# Patient Record
Sex: FEMALE | Race: BLACK OR AFRICAN AMERICAN | NOT HISPANIC OR LATINO | Employment: OTHER | URBAN - METROPOLITAN AREA
[De-identification: names, ages, dates, MRNs, and addresses within clinical notes are randomized per-mention and may not be internally consistent; named-entity substitution may affect disease eponyms.]

---

## 2017-05-05 DIAGNOSIS — Z12.11 COLON CANCER SCREENING: Primary | ICD-10-CM

## 2018-04-18 ENCOUNTER — OFFICE VISIT (OUTPATIENT)
Dept: INTERNAL MEDICINE | Facility: CLINIC | Age: 73
End: 2018-04-18

## 2018-04-18 VITALS
SYSTOLIC BLOOD PRESSURE: 140 MMHG | HEART RATE: 84 BPM | OXYGEN SATURATION: 98 % | RESPIRATION RATE: 16 BRPM | DIASTOLIC BLOOD PRESSURE: 85 MMHG | WEIGHT: 194.2 LBS | BODY MASS INDEX: 34.4 KG/M2 | TEMPERATURE: 98.3 F

## 2018-04-18 DIAGNOSIS — M54.50 CHRONIC RIGHT-SIDED LOW BACK PAIN WITHOUT SCIATICA: Primary | ICD-10-CM

## 2018-04-18 DIAGNOSIS — M54.9 UPPER BACK PAIN ON RIGHT SIDE: ICD-10-CM

## 2018-04-18 DIAGNOSIS — M65.90 SYNOVITIS AND TENOSYNOVITIS: ICD-10-CM

## 2018-04-18 DIAGNOSIS — G89.29 CHRONIC RIGHT-SIDED LOW BACK PAIN WITHOUT SCIATICA: Primary | ICD-10-CM

## 2018-04-18 PROCEDURE — 99214 OFFICE O/P EST MOD 30 MIN: CPT | Performed by: INTERNAL MEDICINE

## 2018-04-18 NOTE — MR AVS SNAPSHOT
After Visit Summary   4/18/2018    Essie Huddleston    MRN: [id number]           Patient Information     Date Of Birth          1945        Visit Information        Provider Department      4/18/2018 3:40 PM Sbe Bass MD [facility]        Today's Diagnoses     Chronic right-sided low back pain without sciatica    -  1    Synovitis and tenosynovitis        Upper back pain on right side          Care Instructions    *  Thumb tendonitis (irritation of the tendon as it runs through the tendon sheath.      --thumb spica splint to help prevent the thumb movement.  Wear this as needed when sleeping, when using your hands etc.           --if the thumb continues to hurt, then you may need to see a hadn specialist for injections into that tendon.     *  Lumbar strain/sprain/spasm and upper back muscle strain    *  based on your history,  No evidence for herniated disc, spinal stenosis, arthritis, or vertebral fracture.    *  take over the counter Motrin/Ibuprofen/Advil or Aleve to help relieve pain and inflammation.  follow the directions on the bottle.  Be sure to take with a small meal to prevent stomach upset.      --Motrin 600 mg ( 3 x 200 mg tablets) three times per day every day for 5-7 days, then 2-3 timers per day as needed (take with food to avoid stomach side effects)     OR     --Aleve 2 tablets twice per day for 5-7 days every day, then twice per day as needed     *  do not go out of your way for activity, however, do not avoid basic activates and gentle activity.  Do not lay on the sofa, do not go on bed rest.      *  moist heat as needed ( do not use a heating pad for longer than 20 minutes to lower the risk of burns)    *  avoid any lifting for the next 1 week, then a slow return to activity.  Remember to always use proper lifting technique, always bending at the knees rather than the waist.  Your thigh muscles are MUCH stronger than the smaller muscle of  "your lower back.    *  Physical therapy through [facility] (many locations throughout the south and [place] [facility]) as needed.    *  expect your back to be more easily re-aggravated over the next few months.  the soft tissue and muscles are going to be more easily re-irritated over the next several weeks so be careful to return to physical action slowly.    Look for back execises on Google.  (search \"low back pain rehabilitation exercises\" and \"upper back rehabilitation exercises\"):  Here are some examples:    --https://[facility]/sites/default/files/LowBackPain.pdf  --https://mydoctor.[facility]/ncal/Images/Low_Back_Pain_Exercises_[email or web address].pdf      *  Contact the  of [employer] and see if there are any discount coupons or programs that you may be eligible for, you can download the application from their web site.     *  If the Levemir insulin is very expensive, then consider changing to the [employer] brand insulin \"N\" insulin, which is taken twice per day.  You would take 1/2 of the Levemir dose twice per day.               *  Use the web site pr smart phone sathya \"GoodRX\" to investigate the cheapest places to get medications.      *  Return to see Dr. Driscoll to re-evaluate the diabetes when you get a chance.      *  Visit the American Diabetes Association web site for more information in diabetes.                   Follow-ups after your visit        Additional Services     [facility] PT, HAND, AND CHIROPRACTIC REFERRAL       **This order will print in the [facility] Scheduling Office**    Physical Therapy, Hand Therapy and Chiropractic Care are available through:    *[facility]  *[facility]  *[facility]    Call one number to schedule at any of the above locations: [phone].    Your provider has referred you to: Physical Therapy at [facility] or [facility]    Indication/Reason for Referral: Low Back Pain and upper back pain  Onset of Illness: " "chronic  Therapy Orders: Evaluate and Treat  Special Programs: None  Special Request: AS INDICATED    Aishwarya Neff      Additional Comments for the Therapist or Chiropractor:     Please be aware that coverage of these services is subject to the terms and limitations of your health insurance plan.  Call member services at your health plan with any benefit or coverage questions.      Please bring the following to your appointment:    *Your personal calendar for scheduling future appointments  *Comfortable clothing                  Who to contact     If you have questions or need follow up information about today's clinic visit or your schedule please contact [facility] directly at [phone].  Normal or non-critical lab and imaging results will be communicated to you by Trueffecthart, letter or phone within 4 business days after the clinic has received the results. If you do not hear from us within 7 days, please contact the clinic through Trueffecthart or phone. If you have a critical or abnormal lab result, we will notify you by phone as soon as possible.  Submit refill requests through Department of Health and Human Services or call your pharmacy and they will forward the refill request to us. Please allow 3 business days for your refill to be completed.          Additional Information About Your Visit        MyChart Information     [facility] lets you send messages to your doctor, view your test results, renew your prescriptions, schedule appointments and more. To sign up, go to [email or web address] . Click on \"Log in\" on the left side of the screen, which will take you to the Welcome page. Then click on \"Sign up Now\" on the right side of the page.     You will be asked to enter the access code listed below, as well as some personal information. Please follow the directions to create your username and password.     Your access code is: [email or web address]  Expires: 2018  5:07 PM     Your access code will  in 90 days. If you " need help or a new code, please call your [facility] clinic or [phone].        Care EveryWhere ID     This is your Care EveryWhere ID. This could be used by other organizations to access your [facility] medical records  [id number]        Your Vitals Were     Pulse Temperature Respirations Pulse Oximetry BMI (Body Mass Index)       84 98.3  F (36.8  C) (Oral) 16 98% 34.4 kg/m2        Blood Pressure from Last 3 Encounters:   04/18/18 140/85   08/09/16 146/82   03/10/15 130/82    Weight from Last 3 Encounters:   04/18/18 194 lb 3.2 oz (88.1 kg)   08/09/16 190 lb (86.2 kg)   03/10/15 191 lb (86.6 kg)              We Performed the Following     [facility] PT, HAND, AND CHIROPRACTIC REFERRAL        Primary Care Provider Office Phone # Fax #    Luigi Driscoll [phone]       [address] [facility] [address]        Equal Access to Services     [facility]: Hadii aad ku hadasho Soomaali, waaxda luqadaha, qaybta kaalmada adeegyada, waxgwen zaragoza hayasa redd . So [facility] [phone].    ATENCIÓN: Si habla español, tiene a rincon disposición servicios gratuitos de asistencia lingüística. Llame al [phone].    We comply with applicable federal civil rights laws and [place] laws. We do not discriminate on the basis of race, color, national origin, age, disability, sex, sexual orientation, or gender identity.            Thank you!     Thank you for choosing [facility]  for your care. Our goal is always to provide you with excellent care. Hearing back from our patients is one way we can continue to improve our services. Please take a few minutes to complete the written survey that you may receive in the mail after your visit with us. Thank you!             Your Updated Medication List - Protect others around you: Learn how to safely use, store and throw away your medicines at www.disposemymeds.org.          This list is accurate as of 4/18/18  5:09 PM.  Always use your most  recent med list.                   Brand Name Dispense Instructions for use Diagnosis    amLODIPine 5 MG tablet    NORVASC    90 tablet    Take 1 tablet (5 mg) by mouth daily    Essential hypertension with goal blood pressure less than 130/80       aspirin 81 MG tablet     10 tablet    Take 1 tablet (81 mg) by mouth daily    HTN (hypertension), Type 2 diabetes, HbA1c goal < 7% (H)       glipiZIDE 5 MG tablet    GLUCOTROL    180 tablet    Take 1 tablet (5 mg) by mouth 2 times daily Before meals    Type 2 diabetes mellitus with diabetic nephropathy (H)       insulin detemir 100 UNIT/ML injection    LEVEMIR FLEXTOUCH    3 Month    30-35 units daily    Type 2 diabetes mellitus with diabetic nephropathy (H)       insulin pen needle 31G X 6 MM     100 each    Use 1 pen needles daily or as directed.    Type 2 diabetes mellitus with diabetic nephropathy (H)       losartan-hydrochlorothiazide 100-12.5 MG per tablet    HYZAAR    90 tablet    Take 1 tablet by mouth daily    Essential hypertension with goal blood pressure less than 130/80       metFORMIN 1000 MG tablet    GLUCOPHAGE    180 tablet    Take 1 tablet (1,000 mg) by mouth 2 times daily (with meals)    Type 2 diabetes mellitus with diabetic nephropathy (H)       NEW MED      Take 1 tablet by mouth daily. Cachnerve Tablets.        order for DME     200 strip    Equipment being ordered:  Glucometer test strips. Check sugars two times a day    Type 2 diabetes mellitus with diabetic nephropathy (H)       simvastatin 20 MG tablet    ZOCOR    90 tablet    Take 1 tablet (20 mg) by mouth At Bedtime    Hyperlipidemia LDL goal <100       traMADol 50 MG tablet    ULTRAM    50 tablet    Take 1 tablet (50 mg) by mouth 2 times daily as needed for moderate pain    Back pain       vitamin D 2000 units tablet     100 tablet    Take 2,000 Units by mouth daily.    Vitamin D deficiency

## 2018-04-18 NOTE — PATIENT INSTRUCTIONS
"*  Thumb tendonitis (irritation of the tendon as it runs through the tendon sheath.      --thumb spica splint to help prevent the thumb movement.  Wear this as needed when sleeping, when using your hands etc.           --if the thumb continues to hurt, then you may need to see a hadn specialist for injections into that tendon.     *  Lumbar strain/sprain/spasm and upper back muscle strain    *  based on your history,  No evidence for herniated disc, spinal stenosis, arthritis, or vertebral fracture.    *  take over the counter Motrin/Ibuprofen/Advil or Aleve to help relieve pain and inflammation.  follow the directions on the bottle.  Be sure to take with a small meal to prevent stomach upset.      --Motrin 600 mg ( 3 x 200 mg tablets) three times per day every day for 5-7 days, then 2-3 timers per day as needed (take with food to avoid stomach side effects)     OR     --Aleve 2 tablets twice per day for 5-7 days every day, then twice per day as needed     *  do not go out of your way for activity, however, do not avoid basic activates and gentle activity.  Do not lay on the sofa, do not go on bed rest.      *  moist heat as needed ( do not use a heating pad for longer than 20 minutes to lower the risk of burns)    *  avoid any lifting for the next 1 week, then a slow return to activity.  Remember to always use proper lifting technique, always bending at the knees rather than the waist.  Your thigh muscles are MUCH stronger than the smaller muscle of your lower back.    *  Physical therapy through [facility] (many locations throughout the south and [facility]) as needed.    *  expect your back to be more easily re-aggravated over the next few months.  the soft tissue and muscles are going to be more easily re-irritated over the next several weeks so be careful to return to physical action slowly.    Look for back execises on [employer].  (search \"low back pain rehabilitation exercises\" and \"upper " "back rehabilitation exercises\"):  Here are some examples:    --https://[facility].[place].[facility]/sites/default/files/LowBackPain.pdf  --https://mydoctor.[facility]/ncal/Images/Low_Back_Pain_Exercises_[email or web address]      *  Contact the  of [employer] and see if there are any discount coupons or programs that you may be eligible for, you can download the application from their web site.     *  If the Levemir insulin is very expensive, then consider changing to the [employer] brand insulin \"N\" insulin, which is taken twice per day.  You would take 1/2 of the Le[employer] dose twice per day.               *  Use the web site pr smart phone sathya \"GoodRX\" to investigate the cheapest places to get medications.      *  Return to see Dr. Driscoll to re-evaluate the diabetes when you get a chance.      *  Visit the American Diabetes Association web site for more information in diabetes.           "

## 2018-04-18 NOTE — PROGRESS NOTES
SUBJECTIVE:   Essie Huddleston is a 73 year old female who presents to clinic today for the following health issues:      Musculoskeletal problem/pain      Duration: about 3-4 months    Description  Location: Left thumb, right shoulder and right hip    Intensity:  Severe 8/10    Accompanying signs and symptoms: numbness and tingling. Thumb is swollen    History  Previous similar problem: YES- with the hip when she had issues with the sciatica  Previous evaluation:  none    Precipitating or alleviating factors:  Trauma or overuse: no   Aggravating factors include: cold or damp weather and when laying down    Therapies tried and outcome: gabapentin and tramadol it relieves it for awhile then when the medication stops the pain returns.          Problem list and histories reviewed & adjusted, as indicated.  Additional history: as documented        Reviewed and updated as needed this visit by clinical staff  Tobacco  Allergies  Meds       Reviewed and updated as needed this visit by Provider           Past Medical History:  ---------------------------  Past Medical History:   Diagnosis Date     Hyperlipidemia LDL goal <100 6/20/2012     Hypertension      Type 2 diabetes mellitus with diabetic nephropathy  (goal A1C<7) 10/24/2015     Vitamin D deficiency 6/20/2012       Past Surgical History:  ---------------------------  No past surgical history on file.    Current Medications:  ---------------------------  Current Outpatient Prescriptions   Medication Sig Dispense Refill     amLODIPine (NORVASC) 5 MG tablet Take 1 tablet (5 mg) by mouth daily 90 tablet 1     aspirin 81 MG tablet Take 1 tablet (81 mg) by mouth daily 10 tablet 0     Cholecalciferol (VITAMIN D) 2000 UNIT tablet Take 2,000 Units by mouth daily. 100 tablet 3     glipiZIDE (GLUCOTROL) 5 MG tablet Take 1 tablet (5 mg) by mouth 2 times daily Before meals 180 tablet 1     insulin detemir (LEVEMIR FLEXTOUCH) 100 UNIT/ML soln 30-35 units daily 3 Month 1      insulin pen needle 31G X 6 MM Use 1 pen needles daily or as directed. 100 each 1     losartan-hydrochlorothiazide (HYZAAR) 100-12.5 MG per tablet Take 1 tablet by mouth daily 90 tablet 1     metFORMIN (GLUCOPHAGE) 1000 MG tablet Take 1 tablet (1,000 mg) by mouth 2 times daily (with meals) 180 tablet 1     NEW MED Take 1 tablet by mouth daily. Cachnerve Tablets.       order for DME Equipment being ordered:  Glucometer test strips. Check sugars two times a day 200 strip 1     simvastatin (ZOCOR) 20 MG tablet Take 1 tablet (20 mg) by mouth At Bedtime 90 tablet 1     traMADol (ULTRAM) 50 MG tablet Take 1 tablet (50 mg) by mouth 2 times daily as needed for moderate pain 50 tablet 0       Allergies:  -------------  Allergies   Allergen Reactions     Nkda [No Known Drug Allergies]        Social History:  -------------------  Social History     Social History     Marital status:      Spouse name: N/A     Number of children: N/A     Years of education: N/A     Occupational History     Not on file.     Social History Main Topics     Smoking status: Never Smoker     Smokeless tobacco: Never Used     Alcohol use Yes      Comment: seldom 1 glass wine.     Drug use: No     Sexual activity: No     Other Topics Concern     Not on file     Social History Narrative       Family Medical History:  ------------------------------  Family History   Problem Relation Age of Onset     DIABETES Mother      CANCER Father      prostate     Asthma Son          ROS:  REVIEW OF SYSTEMS:    RESP: negative for cough, dyspnea, wheezing, hemoptysis  CV: negative for chest pain, palpitations, PND, AKHTAR, orthopnea; reports no changes in their ability to perform physical activity (from cardiovascular standpoint)  GI: negative for dysphagia, N/V, pain, melena, diarrhea and constipation  NEURO: negative for numbness/tingling, paralysis, incoordination, or focal weakness     OBJECTIVE:                                                    /85   Pulse 84  Temp 98.3  F (36.8  C) (Oral)  Resp 16  Wt 194 lb 3.2 oz (88.1 kg)  SpO2 98%  BMI 34.4 kg/m2     GENERAL alert and no distress  EYES:  Normal sclera,conjunctiva, EOMI  HENT: oral and posterior pharynx without lesions or erythema, facies symmetric  NECK: Neck supple. No LAD, without thyroidmegaly or JVD., Carotids without bruits.  RESP: Clear to ausculation bilaterally without wheezes or crackles. Normal BS in all fields.  CV: RRR normal S1S2 without murmurs, rubs or gallops. PMI normal  LYMPH: no cervical lymph adenopathy appreciated  MS: extremities- no gross deformities of the visible extremities noted, no edema  PSYCH: Alert and oriented times 3; speech- coherent  SKIN:  No obvious significant skin lesions on visible portions of face     BACK:  Pt appears uncomfortable, but not in severe distress.    Pain to palpation of the muscle of the upper back, particularly the rhomboids.  The muscles in the upper and mid back are in spasm and tight.  Palpation here exactly reproduces their pain.  No pain palpation of the vertebral bodies themselves.   Pain to palpation of paraspinal lumbar muscles, muscles in spasm, palpation reproduces pain exactly. straight leg-raises negative bilaterally.  Able to move on and off the exam table, no obsevred weakness in the feet or legs with walking, standing, or ambulation. Normal reflexes in the achilles and patellar tendons.     HAND:  Pain in right thumb tendon. Positive finklesteins test.      ASSESSMENT/PLAN:                                                      (M54.5,  G89.29) Chronic right-sided low back pain without sciatica  (primary encounter diagnosis)  Comment: Discussed typical mechanical back pain, typical causes, and atypical back pain, including red flag symptoms.  Discussed conservative tratments inclduing physical therpy, stretching and strengthening, use of heat and/or ice, NSAIDs with food, antispasmodics where indicated, and the use of narcotic pain  meds where indicated.    Plan: [facility] PT, HAND, AND CHIROPRACTIC REFERRAL            (M65.9) Synovitis and tenosynovitis  Comment: thumba spcia splint  Refer to Ortho for injection if no  Better.   Plan:     (M54.9) Upper back pain on right side  Comment: Upper back pain with fair amount of myofascial pain.  No evidence for spinal or vertebral pathology.  No imaging indicated at this time.   Discussed the typical causes for these pains and recommended proper ergonomics and body mechanical issues.   Recommended moist heat, NSAIDs if able to tolerate, stretching, massage/trigger point release, and physical therapy if the patient desires.  Told the patient to return if there are any changes in the symptoms worsen, change in any way, or fail to respond to these conservative measures.     Plan: [facility] PT, HAND, AND CHIROPRACTIC REFERRAL               *  Thumb tendonitis (irritation of the tendon as it runs through the tendon sheath.      --thumb spica splint to help prevent the thumb movement.  Wear this as needed when sleeping, when using your hands etc.           --if the thumb continues to hurt, then you may need to see a hadn specialist for injections into that tendon.     *  Lumbar strain/sprain/spasm and upper back muscle strain    *  based on your history,  No evidence for herniated disc, spinal stenosis, arthritis, or vertebral fracture.    *  take over the counter Motrin/Ibuprofen/Advil or Aleve to help relieve pain and inflammation.  follow the directions on the bottle.  Be sure to take with a small meal to prevent stomach upset.      --Motrin 600 mg ( 3 x 200 mg tablets) three times per day every day for 5-7 days, then 2-3 timers per day as needed (take with food to avoid stomach side effects)     OR     --Aleve 2 tablets twice per day for 5-7 days every day, then twice per day as needed     *  do not go out of your way for activity, however, do not avoid basic activates and gentle activity.  Do not lay on the sofa,  "do not go on bed rest.      *  moist heat as needed ( do not use a heating pad for longer than 20 minutes to lower the risk of burns)    *  avoid any lifting for the next 1 week, then a slow return to activity.  Remember to always use proper lifting technique, always bending at the knees rather than the waist.  Your thigh muscles are MUCH stronger than the smaller muscle of your lower back.    *  Physical therapy through [facility] (many locations throughout the [place] and [facility]) as needed.    *  expect your back to be more easily re-aggravated over the next few months.  the soft tissue and muscles are going to be more easily re-irritated over the next several weeks so be careful to return to physical action slowly.    Look for back execises on Google.  (search \"low back pain rehabilitation exercises\" and \"upper back rehabilitation exercises\"):  Here are some examples:    --https://[facility].[place].[facility]/sites/default/files/LowBackPain.pdf  --[email or web address]_Back_Pain_Exercises_[email or web address]      *  Contact the  of [employer] and see if there are any discount coupons or programs that you may be eligible for, you can download the application from their web site.     *  If the [employer]mir insulin is very expensive, then consider changing to the [employer] brand insulin \"N\" insulin, which is taken twice per day.  You would take 1/2 of the [employer]mir dose twice per day.               *  Use the web site pr smart phone sathya \"GoodRX\" to investigate the cheapest places to get medications.      *  Return to see Dr. Driscoll to re-evaluate the diabetes when you get a chance.      *  Visit the American Diabetes Association web site for more information in diabetes.               See Patient Instructions    MARTIN NEIL M.D., MD  [facility]     "

## 2018-05-03 DIAGNOSIS — Z79.4 TYPE 2 DIABETES MELLITUS WITH DIABETIC NEPHROPATHY, WITH LONG-TERM CURRENT USE OF INSULIN (H): ICD-10-CM

## 2018-05-03 DIAGNOSIS — Z11.59 NEED FOR HEPATITIS C SCREENING TEST: ICD-10-CM

## 2018-05-03 DIAGNOSIS — I10 ESSENTIAL HYPERTENSION WITH GOAL BLOOD PRESSURE LESS THAN 130/80: Primary | ICD-10-CM

## 2018-05-03 DIAGNOSIS — E11.21 TYPE 2 DIABETES MELLITUS WITH DIABETIC NEPHROPATHY, WITH LONG-TERM CURRENT USE OF INSULIN (H): ICD-10-CM

## 2018-05-04 ENCOUNTER — THERAPY VISIT (OUTPATIENT)
Dept: PHYSICAL THERAPY | Facility: CLINIC | Age: 73
End: 2018-05-04

## 2018-05-04 DIAGNOSIS — M54.9 UPPER BACK PAIN ON RIGHT SIDE: ICD-10-CM

## 2018-05-04 DIAGNOSIS — M54.50 BILATERAL LOW BACK PAIN WITHOUT SCIATICA: Primary | ICD-10-CM

## 2018-05-04 PROCEDURE — 97110 THERAPEUTIC EXERCISES: CPT | Mod: GP | Performed by: PHYSICAL THERAPIST

## 2018-05-04 PROCEDURE — 97162 PT EVAL MOD COMPLEX 30 MIN: CPT | Mod: GP | Performed by: PHYSICAL THERAPIST

## 2018-05-04 NOTE — MR AVS SNAPSHOT
After Visit Summary   5/4/2018    Essie Huddleston    MRN: [id number]           Patient Information     Date Of Birth          1945        Visit Information        Provider Department      5/4/2018 4:00 PM Tova Pa, NESSA [facility]        Today's Diagnoses     Bilateral low back pain without sciatica    -  1    Upper back pain on right side           Follow-ups after your visit        Your next 10 appointments already scheduled     May 11, 2018 10:10 AM CDT   SUSAN Spine with Tova Pa PT   [facility] Physical Therapy ([facility]  )    [address] [facility] [address]  [facility] [address]   [phone]            May 11, 2018 12:00 PM CDT   (Arrive by 11:45 AM)   SUSAN Hand with Glory Borja   [facility] ([facility])    [address] [place] [address]   [phone]            May 17, 2018  8:10 AM CDT   SUSAN Spine with Tova Pa PT   [facility] ([facility]  )    [address] [place] [address] [address]  [facility] [address]   [phone]              Who to contact     If you have questions or need follow up information about today's clinic visit or your schedule please contact [facility] PHYS[facility] THERAPY directly at [phone].  Normal or non-critical lab and imaging results will be communicated to you by MyChart, letter or phone within 4 business days after the clinic has received the results. If you do not hear from us within 7 days, please contact the clinic through MyChart or phone. If you have a critical or abnormal lab result, we will notify you by phone as soon as possible.  Submit refill requests through [employer] or call your pharmacy and they will forward the refill request to us. Please allow 3 business days for your refill to be completed.          Additional  "Information About Your Visit        MyChart Information     [employer] lets you send messages to your doctor, view your test results, renew your prescriptions, schedule appointments and more. To sign up, go to [email or web address] . Click on \"Log in\" on the left side of the screen, which will take you to the Welcome page. Then click on \"Sign up Now\" on the right side of the page.     You will be asked to enter the access code listed below, as well as some personal information. Please follow the directions to create your username and password.     Your access code is: [email or web address]  Expires: 2018  5:07 PM     Your access code will  in 90 days. If you need help or a new code, please call your [facility] clinic or [phone].        Care EveryWhere ID     This is your Care EveryWhere ID. This could be used by other organizations to access your [facility] medical records  [id number]         Blood Pressure from Last 3 Encounters:   18 140/85   16 146/82   03/10/15 130/82    Weight from Last 3 Encounters:   18 88.1 kg (194 lb 3.2 oz)   16 86.2 kg (190 lb)   03/10/15 86.6 kg (191 lb)              We Performed the Following     SUSAN Inital Eval Report     PT Eval, Moderate Complexity (41871)     Therapeutic Exercises        Primary Care Provider Office Phone # Fax #    Luigi Driscoll [phone]       [address] [place] [address]        Equal Access to Services     [facility]: Hadii aad ku hadasho Soomaali, waaxda luqadaha, qaybta kaalmada lis landry . So [facility] [phone].    ATENCIÓN: Si habla español, tiene a rincon disposición servicios gratuitos de asistencia lingüística. Llame al [phone].    We comply with applicable federal civil rights laws and [place] laws. We do not discriminate on the basis of race, color, national origin, age, disability, sex, sexual orientation, or gender identity.            Thank you!     " Thank you for choosing [facility]  for your care. Our goal is always to provide you with excellent care. Hearing back from our patients is one way we can continue to improve our services. Please take a few minutes to complete the written survey that you may receive in the mail after your visit with us. Thank you!             Your Updated Medication List - Protect others around you: Learn how to safely use, store and throw away your medicines at www.disposemymeds.org.          This list is accurate as of 5/4/18 11:59 PM.  Always use your most recent med list.                   Brand Name Dispense Instructions for use Diagnosis    amLODIPine 5 MG tablet    NORVASC    90 tablet    Take 1 tablet (5 mg) by mouth daily    Essential hypertension with goal blood pressure less than 130/80       aspirin 81 MG tablet     10 tablet    Take 1 tablet (81 mg) by mouth daily    HTN (hypertension), Type 2 diabetes, HbA1c goal < 7% (H)       glipiZIDE 5 MG tablet    GLUCOTROL    180 tablet    Take 1 tablet (5 mg) by mouth 2 times daily Before meals    Type 2 diabetes mellitus with diabetic nephropathy (H)       insulin detemir 100 UNIT/ML injection    LEVEMIR FLEXTOUCH    3 Month    30-35 units daily    Type 2 diabetes mellitus with diabetic nephropathy (H)       insulin pen needle 31G X 6 MM     100 each    Use 1 pen needles daily or as directed.    Type 2 diabetes mellitus with diabetic nephropathy (H)       losartan-hydrochlorothiazide 100-12.5 MG per tablet    HYZAAR    90 tablet    Take 1 tablet by mouth daily    Essential hypertension with goal blood pressure less than 130/80       metFORMIN 1000 MG tablet    GLUCOPHAGE    180 tablet    Take 1 tablet (1,000 mg) by mouth 2 times daily (with meals)    Type 2 diabetes mellitus with diabetic nephropathy (H)       NEW MED      Take 1 tablet by mouth daily. Cachnerve Tablets.        order for DME     200 strip    Equipment being  ordered:  Glucometer test strips. Check sugars two times a day    Type 2 diabetes mellitus with diabetic nephropathy (H)       simvastatin 20 MG tablet    ZOCOR    90 tablet    Take 1 tablet (20 mg) by mouth At Bedtime    Hyperlipidemia LDL goal <100       traMADol 50 MG tablet    ULTRAM    50 tablet    Take 1 tablet (50 mg) by mouth 2 times daily as needed for moderate pain    Back pain       vitamin D 2000 units tablet     100 tablet    Take 2,000 Units by mouth daily.    Vitamin D deficiency

## 2018-05-04 NOTE — PROGRESS NOTES
[facility] Initial Evaluation  Subjective:  Patient is a 73 year old female presenting with rehab back hpi.   Essie Huddleston is a 73 year old female with a lumbar condition.  Condition occurred with:  Insidious onset.  Condition occurred: for unknown reasons.  This is a chronic and recurrent condition  Pt presents with complaints of right upper back pain and bilateral low back pain. Pt reported upper back pain  developed approximately 3 months ago for no apparent reason. Pt reports lower back pain has been ongoing since 2013; pt reports periods of mild lower back pain and periods of moderate to high low back pain. Pt reported onset of lower back pain for no apparent reason. Pt reports [place] is lives [place] the [place] 6 months of the year with her daughter and 6 months of the year in [place]; pt will be returning to [place] on 5-23-18.    Patient reports pain:  Lumbar spine right, lumbar spine left and thoracic spine right.  Radiates to:  No radiation.  Pain is described as aching and sharp and is constant and reported as 6/10 (3/10).   Pain is worse during the night and worse during the day.  Symptoms are exacerbated by bending, standing and lying down   Since onset symptoms are unchanged.        General health as reported by patient is good.              Primary job tasks include:  Prolonged sitting.    Barriers include:  None as reported by the patient.                            Objective:  Standing Alignment:        Lumbar:  Lordosis incr                           Lumbar/SI Evaluation  ROM:    AROM Lumbar:   Flexion:        WNL's, increased lower back pain during forwards flexion; increased lower back pain with return to upright-return to upright more painful than forwards flexion  Ext:                    Moderate loss in standing, increased bilateral low back pain   Side Bend:        Left:     Right:   Rotation:           Left:     Right:   Side Glide:        Left:     Right:           Lumbar  Myotomes:  normal                  Neural Tension/Mobility:      Left side:SLR  negative.   Right side:   SLR positive.      Lumbar Provocation:    Left positive with:  PROM hip  Right positive with: PROM hip                                                 General     ROS    Assessment/Plan:    Patient is a 73 year old female with cervical and lumbar complaints.    Patient has the following significant findings with corresponding treatment plan.                Diagnosis 1:  Back pain  Pain -  self management, education and home program  Decreased ROM/flexibility - therapeutic exercise  Decreased strength - therapeutic exercise and therapeutic activities  Impaired muscle performance - neuro re-education  Decreased function - therapeutic activities    Therapy Evaluation Codes:   1) History comprised of:   Personal factors that impact the plan of care:      Time since onset of symptoms.    Comorbidity factors that impact the plan of care are:      Osteoarthritis and Overweight.     Medications impacting care: None.  2) Examination of Body Systems comprised of:   Body structures and functions that impact the plan of care:      Cervical spine and Lumbar spine.   Activity limitations that impact the plan of care are:      Dressing, Lifting, Squatting/kneeling, Standing and Sleeping.  3) Clinical presentation characteristics are:   Stable/Uncomplicated.  4) Decision-Making    Low complexity using standardized patient assessment instrument and/or measureable assessment of functional outcome.  Cumulative Therapy Evaluation is: Low complexity.    Previous and current functional limitations:  (See Goal Flow Sheet for this information)    Short term and Long term goals: (See Goal Flow Sheet for this information)     Communication ability:  Patient appears to be able to clearly communicate and understand verbal and written communication and follow directions correctly.  Treatment Explanation - The following has been discussed  with the patient:   RX ordered/plan of care  Anticipated outcomes  Possible risks and side effects  This patient would benefit from PT intervention to resume normal activities.   Rehab potential is fair; pt will be returning to [facility] in 3 weeks.    Frequency:  1 X week, once daily  Duration:  for 3 visits  Discharge Plan:  Independent in home treatment program.  Reach maximal therapeutic benefit.    Please refer to the daily flowsheet for treatment today, total treatment time and time spent performing 1:1 timed codes.

## 2018-05-07 PROBLEM — M54.9 UPPER BACK PAIN ON RIGHT SIDE: Status: ACTIVE | Noted: 2018-05-07

## 2018-05-07 PROBLEM — M54.50 BILATERAL LOW BACK PAIN WITHOUT SCIATICA: Status: ACTIVE | Noted: 2018-05-07

## 2018-05-07 RX ORDER — PEN NEEDLE, DIABETIC 31 G X1/4"
NEEDLE, DISPOSABLE MISCELLANEOUS
Qty: 100 EACH | Refills: 1 | Status: SHIPPED | OUTPATIENT
Start: 2018-05-07 | End: 2019-12-26

## 2018-05-07 NOTE — TELEPHONE ENCOUNTER
Routing refill request to provider for review/approval because:  BP is out of range per protocol

## 2018-05-08 PROBLEM — Z79.4 TYPE 2 DIABETES MELLITUS WITH DIABETIC NEPHROPATHY, WITH LONG-TERM CURRENT USE OF INSULIN (H): Status: ACTIVE | Noted: 2018-05-08

## 2018-05-08 PROBLEM — E11.21 TYPE 2 DIABETES MELLITUS WITH DIABETIC NEPHROPATHY, WITHOUT LONG-TERM CURRENT USE OF INSULIN (H): Status: ACTIVE | Noted: 2018-05-08

## 2018-05-08 PROBLEM — E11.21 TYPE 2 DIABETES MELLITUS WITH DIABETIC NEPHROPATHY, WITH LONG-TERM CURRENT USE OF INSULIN (H): Status: ACTIVE | Noted: 2018-05-08

## 2018-05-08 RX ORDER — LOSARTAN POTASSIUM AND HYDROCHLOROTHIAZIDE 12.5; 1 MG/1; MG/1
TABLET ORAL
Qty: 30 TABLET | Refills: 0 | Status: SHIPPED | OUTPATIENT
Start: 2018-05-08 | End: 2019-12-26 | Stop reason: ALTCHOICE

## 2018-05-08 NOTE — PROGRESS NOTES
[facility] Initial Evaluation  Subjective:  Patient is a 73 year old female presenting with rehab left ankle/foot hpi.                                      Pertinent medical history includes:  Overweight, diabetes and high blood pressure.  Medical allergies: no.  Other surgeries include:  Other (Hysterectomy).  Current medications:  High blood pressure medication and pain medication.  Current occupation is Retired.                  Oswestry Score: 36 %                 Objective:  System    Physical Exam    General     ROS    Assessment/Plan:

## 2018-05-09 NOTE — TELEPHONE ENCOUNTER
I have not seen pt since 2016. LAst BP from 2016 visit and visit 3 weeks ago with Dr Kali humphrey. Hx DM and has not had diabetic labs done since 2016. Call pt.  RF medication done for 30 days only. Pt to have a fasting blood and urine lab done in the next 1 week and see me kell 4 weeks for follow-up of BP and lab results and sugars. Check blood sugars twice a day (before breakfast and bedtime) for 4 days prior to the appointment with me and bring the results to the appointment.  Rx for generic test strips sent to [facility] pharmacy. Pt may bring her glucometer to the pharmacy do they can match type of glucometer to test strip Rx  Inform pt

## 2018-05-09 NOTE — TELEPHONE ENCOUNTER
Called Essie and got her VM.  Left a detailed Message (CTC) on her answering machine regarding the message below.     CLAUDIA Ellis ([facility])

## 2018-05-11 ENCOUNTER — THERAPY VISIT (OUTPATIENT)
Dept: OCCUPATIONAL THERAPY | Facility: CLINIC | Age: 73
End: 2018-05-11

## 2018-05-11 DIAGNOSIS — M54.9 UPPER BACK PAIN ON RIGHT SIDE: ICD-10-CM

## 2018-05-11 DIAGNOSIS — M65.319 TRIGGER THUMB: Primary | ICD-10-CM

## 2018-05-11 DIAGNOSIS — M54.50 BILATERAL LOW BACK PAIN WITHOUT SCIATICA: ICD-10-CM

## 2018-05-11 PROCEDURE — 97110 THERAPEUTIC EXERCISES: CPT | Mod: GO | Performed by: OCCUPATIONAL THERAPIST

## 2018-05-11 PROCEDURE — 97165 OT EVAL LOW COMPLEX 30 MIN: CPT | Mod: GO | Performed by: OCCUPATIONAL THERAPIST

## 2018-05-11 PROCEDURE — 97140 MANUAL THERAPY 1/> REGIONS: CPT | Mod: GO | Performed by: OCCUPATIONAL THERAPIST

## 2018-05-11 PROCEDURE — 97026 INFRARED THERAPY: CPT | Mod: GO | Performed by: OCCUPATIONAL THERAPIST

## 2018-05-11 PROCEDURE — 97760 ORTHOTIC MGMT&TRAING 1ST ENC: CPT | Mod: GO | Performed by: OCCUPATIONAL THERAPIST

## 2018-05-11 NOTE — PROGRESS NOTES
Hand Therapy Initial Evaluation    Current Date:  5/11/2018    Diagnosis:  Left thumb tendonitis  DOI: 4 months ago    Subjective:  Essie Huddleston is a 73 year old right hand dominant female.    Patient reports symptoms of pain, stiffness/loss of motion and weakness/loss of strength of the left thumb which occurred due to unknown. Since onset symptoms are Rapidly getting worse.  Special tests:  none.  Previous treatment: fabric orthotic from MD.    General health as reported by patient is fair.  Pertinent medical history includes:diabetes, overweight, high blood pressure, numbness/tingling  Medical allergies:none.  Surgical history: none.  Medication history: anti-inflammatory, pain, high blood pressure.    Occupational Profile Information:  Current occupation is retired  Job Tasks: lifting/carrying, repetitive tasks  Prior functional level:  no limitations  Barriers include:none  Mobility: No difficulty  Transportation: family members drive  Leisure activities/hobbies: garden, cook  Other: cooking, watch 1 year old grandson    Functional Outcome Measure:  Upper Extremity Functional Index Score:  SCORE:   Column Totals: /80: 46   (A lower score indicates greater disability.)    Objective:  Pain Report:  VAS(0-10) 5/11/2018   At Rest: 0/10   With Use: 7/10     Location:  thumb  Pain Quality:  Aching and Other (catching)  Frequency: intermittent    Pain is worst:  daytime  Exacerbated by:  Bending thumb  Relieved by:  Orthosis from MD unless moving with orthosis on.  Progression:  unchanged  ROM:  Pain Report:  - none    + mild    ++ moderate    +++ severe   Left Thumb 5/11/18   AROM(PROM) Left   MP 0/40 (/55)   IP 0/40 (/55)       CULLEN 80 (110)     Stage of Stenosing Tenosynovitis (SST):   5/11/2018   Triggering of Left thumb Stage 4   Stage 1:  Normal  Stage 2:  Uneven motion of tendon  Stage 3:  Triggering, clicking, catching  Stage 4:  Locking in extension or flexion; unlocked by active motion  Stage 5:  Locking in  extension or flexion; unlocked by passive motion  Stage 6:  Finger locked in extension or flexion    Palpation:   5/11/18   A1 pulley thumb ++   A3 pulley thumb ++     Sensation:  WNL throughout all nerve distributions; per patient report    Strength:   (Measured in pounds)  Pain Report:  - none    + mild    ++ moderate    +++ severe   NT due to triggering    Assessment:  Patient presents with symptoms consistent with diagnosis of left trigger thumb,  with conservative intervention.     Patient's limitations or Problem List includes:  Pain, Decreased ROM/motion, Weakness, Decreased , Decreased pinch and Adherence in connective tissue of the left thumb which interferes with the patient's ability to perform Self Care Tasks (dressing, eating, bathing, hygiene/toileting) and Household Chores as compared to previous level of function.    Rehab Potential:  Good - Return to full activity, some limitations    Patient will benefit from skilled Occupational Therapy to increase ROM, flexibility and overall strength and decrease pain and triggering to return to previous activity level and resume normal daily tasks and to reach their rehab potential.    Barriers to Learning:  No barrier    Communication Issues:  Patient appears to be able to clearly communicate and understand verbal and written communication and follow directions correctly.    Chart Review: Detailed history review with patient    Identified Performance Deficits: bathing/showering, toileting, dressing, feeding, hygiene and grooming, health management and maintenance, home establishment and management, meal preparation and cleanup and shopping    Assessment of Occupational Performance:  5 or more Performance Deficits    Clinical Decision Making (Complexity): Low complexity    Treatment Explanation:  The following has been discussed with the patient:  RX ordered/plan of care  Anticipated outcomes  Possible risks and side effects    Plan:  Frequency:  1-2 X  week, once daily  Duration:  for 3-4 visits and then pt. Returns to [facility].    Treatment Plan:    Modalities:  Laser Light  Therapeutic Exercise:  PROM  Manual Techniques:  Friction massage  Orthotic Fabrication:  Static orthosis with IP included.    Discharge Plan:  Achieve all LTG.  Independent in home treatment program.  Reach maximal therapeutic benefit.    Home Exercise Program:  Warmth for stiffness  Ice to A1and A3 pulleys/palm for inflammation  Decongestive and TFM to palm and A1 and A3 pulley  PROM finger flexion  [place] massage to thenar eminence    Next Visit:  AROM fingers E/F, avoid triggering  Laser  FM  Thenar trigger massage

## 2018-05-14 ENCOUNTER — THERAPY VISIT (OUTPATIENT)
Dept: OCCUPATIONAL THERAPY | Facility: CLINIC | Age: 73
End: 2018-05-14

## 2018-05-14 DIAGNOSIS — M65.319 TRIGGER THUMB: ICD-10-CM

## 2018-05-14 PROCEDURE — 97140 MANUAL THERAPY 1/> REGIONS: CPT | Mod: GO | Performed by: OCCUPATIONAL THERAPIST

## 2018-05-14 PROCEDURE — 97110 THERAPEUTIC EXERCISES: CPT | Mod: GO | Performed by: OCCUPATIONAL THERAPIST

## 2018-05-15 ENCOUNTER — THERAPY VISIT (OUTPATIENT)
Dept: PHYSICAL THERAPY | Facility: CLINIC | Age: 73
End: 2018-05-15

## 2018-05-15 DIAGNOSIS — G89.29 CHRONIC BILATERAL LOW BACK PAIN WITHOUT SCIATICA: ICD-10-CM

## 2018-05-15 DIAGNOSIS — M54.50 CHRONIC BILATERAL LOW BACK PAIN WITHOUT SCIATICA: ICD-10-CM

## 2018-05-15 DIAGNOSIS — M54.9 UPPER BACK PAIN ON RIGHT SIDE: ICD-10-CM

## 2018-05-15 PROCEDURE — 97140 MANUAL THERAPY 1/> REGIONS: CPT | Mod: GP | Performed by: PHYSICAL THERAPIST

## 2018-05-15 PROCEDURE — 97530 THERAPEUTIC ACTIVITIES: CPT | Mod: GP | Performed by: PHYSICAL THERAPIST

## 2018-05-15 PROCEDURE — 97110 THERAPEUTIC EXERCISES: CPT | Mod: GP | Performed by: PHYSICAL THERAPIST

## 2018-05-16 ENCOUNTER — THERAPY VISIT (OUTPATIENT)
Dept: OCCUPATIONAL THERAPY | Facility: CLINIC | Age: 73
End: 2018-05-16

## 2018-05-16 DIAGNOSIS — M65.319 TRIGGER THUMB: ICD-10-CM

## 2018-05-16 PROCEDURE — 97140 MANUAL THERAPY 1/> REGIONS: CPT | Mod: GO | Performed by: OCCUPATIONAL THERAPIST

## 2018-05-16 PROCEDURE — 97110 THERAPEUTIC EXERCISES: CPT | Mod: GO | Performed by: OCCUPATIONAL THERAPIST

## 2018-05-17 ENCOUNTER — THERAPY VISIT (OUTPATIENT)
Dept: PHYSICAL THERAPY | Facility: CLINIC | Age: 73
End: 2018-05-17

## 2018-05-17 DIAGNOSIS — M54.50 CHRONIC BILATERAL LOW BACK PAIN WITHOUT SCIATICA: ICD-10-CM

## 2018-05-17 DIAGNOSIS — G89.29 CHRONIC BILATERAL LOW BACK PAIN WITHOUT SCIATICA: ICD-10-CM

## 2018-05-17 DIAGNOSIS — M54.9 UPPER BACK PAIN ON RIGHT SIDE: ICD-10-CM

## 2018-05-17 PROCEDURE — 97110 THERAPEUTIC EXERCISES: CPT | Mod: GP | Performed by: PHYSICAL THERAPIST

## 2018-09-19 PROBLEM — M65.319 TRIGGER THUMB: Status: RESOLVED | Noted: 2018-05-11 | Resolved: 2018-09-19

## 2018-09-19 NOTE — PROGRESS NOTES
Discharge Note -Hand Therapy    Initial Evaluation Date:  5/11/2018  Current Date: 9/19/2018  Number of Visits: 3    S:    Pt did not follow up for scheduled visits.    O:  Objective information is not available as pt has not returned for therapy.  Last visit or last objective information will serve as final entry.      A: Pt did not return for further treatment.    Status of goals is unknown due to lack of followup of patient.      P:  Discharge from Hand Therapy.

## 2019-02-12 PROBLEM — M54.50 BILATERAL LOW BACK PAIN WITHOUT SCIATICA: Status: RESOLVED | Noted: 2018-05-07 | Resolved: 2019-02-12

## 2019-02-12 PROBLEM — M54.9 UPPER BACK PAIN ON RIGHT SIDE: Status: RESOLVED | Noted: 2018-05-07 | Resolved: 2019-02-12

## 2019-02-12 NOTE — PROGRESS NOTES
Discharge Note    Progress reporting period is from initial eval/last PN to May 17, 2018.     Essie failed to return and current status is unknown.  Please see information below for last relevant information on current status.  Patient seen for 3 visits.  SUBJECTIVE  Subjective changes noted by patient:  Pt reports her upper back pain continues to lessen; most aware of upper back pain during the night. Reports she likes to fall asleep on her stomach but then usually moves to her sides throughout the night. Feels the low back is slightly better; performing new ex's 2x/day-indicated that between low back ex's and hand ex's she doesn't have a lot of extra time.    Current pain level is 3/10  Previous pain level was   3/10  Changes in function:  Goals ongoing.  Adverse reaction to treatment or activity: None    OBJECTIVE  Changes noted in objective findings: FIS: reports of pulling bilateral buttock/thighs, slight increase in low back pain with return to upright. EIS: increased low back pain at end range-no worse with reps. EIL-slight increase low back pain/tightness at end range-no worse with reps.     ASSESSMENT/PLAN  Diagnosis: bilateral low back   DIAGP:  Diagnoses of Chronic bilateral low back pain without sciatica and Upper back pain on right side were pertinent to this visit.  STG/LTGs have been met or progress has been made towards goals:  Goals ongoing.  Assessment of Progress: current status is unknown.    Last current status:  See above.  Self Management Plans:  HEP  I have re-evaluated this patient and find that the nature, scope, duration and intensity of the therapy is appropriate for the medical condition of the patient.  Essie continues to require the following intervention to meet STG and LTG's:  HEP.    Recommendations:  Discharge with current home program.  Patient to follow up with MD as needed.    Please refer to the daily flowsheet for treatment today, total treatment time and time spent performing  1:1 timed codes.

## 2019-09-21 NOTE — TELEPHONE ENCOUNTER
"Requested Prescriptions   Pending Prescriptions Disp Refills     losartan-hydrochlorothiazide (HYZAAR) 100-12.5 MG per tablet [Pharmacy Med Name: LOSARTAN/-12.5MG TAB]  Last Written Prescription Date:  08/12/2016  Last Fill Quantity: 90 tablet,  # refills: 1   Last Office Visit: 4/18/2018 ABIEL Bass  Future Office Visit:    90 tablet 1     Sig: TAKE ONE TABLET BY MOUTH ONCE DAILY    Angiotensin-II Receptors Failed    5/3/2018 10:41 AM       Failed - Blood pressure under 140/90 in past 12 months    BP Readings from Last 3 Encounters:   04/18/18 140/85   08/09/16 146/82   03/10/15 130/82                Failed - Normal serum creatinine on file in past 12 months    Recent Labs   Lab Test  08/05/16   0855   CR  0.68            Failed - Normal serum potassium on file in past 12 months    Recent Labs   Lab Test  08/05/16   0855   POTASSIUM  4.2                   Passed - Recent (12 mo) or future (30 days) visit within the authorizing provider's specialty    Patient had office visit in the last 12 months or has a visit in the next 30 days with authorizing provider or within the authorizing provider's specialty.  See \"Patient Info\" tab in inbasket, or \"Choose Columns\" in Meds & Orders section of the refill encounter.           Passed - Patient is age 18 or older       Passed - No active pregnancy on record       Passed - No positive pregnancy test in past 12 months        RELION MINI PEN NEEDLES 31G X 6 MM [Pharmacy Med Name: RELION PEN NEEDLES 20DT5SW MIS]  Last Written Prescription Date:  08/12/2016  Last Fill Quantity: 100 each,  # refills: 1   Last Office Visit: 4/18/2018 ABIEL Bass  Future Office Visit:    100 each 1     Sig: USE 1 PEN NEEDLE DAILY OR AS DIRECTED    Diabetic Supplies Protocol Passed    5/3/2018 10:41 AM       Passed - Patient is 18 years of age or older       Passed - Recent (6 mo) or future (30 days) visit within the authorizing provider's specialty    Patient had office visit in the last 6 months " "or has a visit in the next 30 days with authorizing provider.  See \"Patient Info\" tab in inbasket, or \"Choose Columns\" in Meds & Orders section of the refill encounter.              " 21-Sep-2019 19:02

## 2019-12-23 ENCOUNTER — TELEPHONE (OUTPATIENT)
Dept: INTERNAL MEDICINE | Facility: CLINIC | Age: 74
End: 2019-12-23

## 2019-12-23 NOTE — TELEPHONE ENCOUNTER
"Spoke with pt's daughter Haydee who was very adamant about getting gabapentin refill.    Upon first discussing with daughter, pt had enough medication to last until appt 01/03/2019-was getting from PCP in [facility].  Further into the conversation, daughter's patient stated she just needed the gabapentin refilled today.  Advised appt needed as last OV with Dr. Driscoll 08/09/2016, daughter very adamant about appt with Dr. Driscoll sooner due to being out of \"all meds\"  Scheduled in same day slot on 12/26 per verbal from Adry.  After appt made daughter inquired about pt going to ER or UC for refills on meds if needed, per verbal from Adry SANON, ok to go to either place if pt and daughter feel necessary.    Advised to call back with questions..    Jes Simmons CMA    "

## 2019-12-23 NOTE — TELEPHONE ENCOUNTER
"Reason for Call:  Other call back    Detailed comments: [place] card. She hasn't seen Dr. Driscoll for about 3yrs. She needs refill on \"diabetes pens and other medications like her gabepentin\". She does have an appt on 1/3/20 at 1130a with Dr. Driscoll but will need medications before that appt. Please call daughter Haydee back at [phone] or Essie at [phone].     Phone Number Patient can be reached at: Cell number on file:    Telephone Information:   Mobile [phone]       Best Time: anytime    Can we leave a detailed message on this number? YES    Call taken on 12/23/2019 at 8:53 AM by Smiley Bass    "

## 2019-12-26 ENCOUNTER — OFFICE VISIT (OUTPATIENT)
Dept: INTERNAL MEDICINE | Facility: CLINIC | Age: 74
End: 2019-12-26

## 2019-12-26 VITALS
RESPIRATION RATE: 16 BRPM | SYSTOLIC BLOOD PRESSURE: 138 MMHG | WEIGHT: 188 LBS | HEART RATE: 68 BPM | HEIGHT: 62 IN | OXYGEN SATURATION: 98 % | DIASTOLIC BLOOD PRESSURE: 80 MMHG | BODY MASS INDEX: 34.6 KG/M2 | TEMPERATURE: 98.3 F

## 2019-12-26 DIAGNOSIS — E11.21 TYPE 2 DIABETES MELLITUS WITH DIABETIC NEPHROPATHY, WITH LONG-TERM CURRENT USE OF INSULIN (H): ICD-10-CM

## 2019-12-26 DIAGNOSIS — I10 ESSENTIAL HYPERTENSION WITH GOAL BLOOD PRESSURE LESS THAN 130/80: ICD-10-CM

## 2019-12-26 DIAGNOSIS — Z79.4 TYPE 2 DIABETES MELLITUS WITH DIABETIC NEPHROPATHY, WITH LONG-TERM CURRENT USE OF INSULIN (H): ICD-10-CM

## 2019-12-26 DIAGNOSIS — G89.29 CHRONIC RIGHT-SIDED LOW BACK PAIN WITH RIGHT-SIDED SCIATICA: ICD-10-CM

## 2019-12-26 DIAGNOSIS — Z23 NEED FOR PROPHYLACTIC VACCINATION AND INOCULATION AGAINST INFLUENZA: ICD-10-CM

## 2019-12-26 DIAGNOSIS — M54.41 CHRONIC RIGHT-SIDED LOW BACK PAIN WITH RIGHT-SIDED SCIATICA: ICD-10-CM

## 2019-12-26 DIAGNOSIS — R00.2 PALPITATIONS: ICD-10-CM

## 2019-12-26 DIAGNOSIS — E78.5 HYPERLIPIDEMIA LDL GOAL <100: ICD-10-CM

## 2019-12-26 DIAGNOSIS — Z12.31 ENCOUNTER FOR SCREENING MAMMOGRAM FOR BREAST CANCER: ICD-10-CM

## 2019-12-26 DIAGNOSIS — Z12.11 COLON CANCER SCREENING: ICD-10-CM

## 2019-12-26 DIAGNOSIS — Z11.59 NEED FOR HEPATITIS C SCREENING TEST: ICD-10-CM

## 2019-12-26 LAB
ALBUMIN SERPL-MCNC: 3.8 G/DL (ref 3.4–5)
ALP SERPL-CCNC: 64 U/L (ref 40–150)
ALT SERPL W P-5'-P-CCNC: 24 U/L (ref 0–50)
ANION GAP SERPL CALCULATED.3IONS-SCNC: 2 MMOL/L (ref 3–14)
AST SERPL W P-5'-P-CCNC: 12 U/L (ref 0–45)
BILIRUB SERPL-MCNC: 0.3 MG/DL (ref 0.2–1.3)
BUN SERPL-MCNC: 13 MG/DL (ref 7–30)
CALCIUM SERPL-MCNC: 9.4 MG/DL (ref 8.5–10.1)
CHLORIDE SERPL-SCNC: 106 MMOL/L (ref 94–109)
CHOLEST SERPL-MCNC: 169 MG/DL
CO2 SERPL-SCNC: 33 MMOL/L (ref 20–32)
CREAT SERPL-MCNC: 0.67 MG/DL (ref 0.52–1.04)
CREAT UR-MCNC: 16 MG/DL
GFR SERPL CREATININE-BSD FRML MDRD: 87 ML/MIN/{1.73_M2}
GLUCOSE SERPL-MCNC: 92 MG/DL (ref 70–99)
HBA1C MFR BLD: 7.6 % (ref 0–5.6)
HCV AB SERPL QL IA: NONREACTIVE
HDLC SERPL-MCNC: 77 MG/DL
LDLC SERPL CALC-MCNC: 74 MG/DL
MICROALBUMIN UR-MCNC: <5 MG/L
MICROALBUMIN/CREAT UR: NORMAL MG/G CR (ref 0–25)
NONHDLC SERPL-MCNC: 92 MG/DL
POTASSIUM SERPL-SCNC: 4.7 MMOL/L (ref 3.4–5.3)
PROT SERPL-MCNC: 7.2 G/DL (ref 6.8–8.8)
SODIUM SERPL-SCNC: 141 MMOL/L (ref 133–144)
TRIGL SERPL-MCNC: 92 MG/DL
TSH SERPL DL<=0.005 MIU/L-ACNC: 3.14 MU/L (ref 0.4–4)

## 2019-12-26 PROCEDURE — 90471 IMMUNIZATION ADMIN: CPT | Performed by: INTERNAL MEDICINE

## 2019-12-26 PROCEDURE — 90662 IIV NO PRSV INCREASED AG IM: CPT | Performed by: INTERNAL MEDICINE

## 2019-12-26 PROCEDURE — 86803 HEPATITIS C AB TEST: CPT | Performed by: INTERNAL MEDICINE

## 2019-12-26 PROCEDURE — 84443 ASSAY THYROID STIM HORMONE: CPT | Performed by: INTERNAL MEDICINE

## 2019-12-26 PROCEDURE — 99214 OFFICE O/P EST MOD 30 MIN: CPT | Mod: 25 | Performed by: INTERNAL MEDICINE

## 2019-12-26 PROCEDURE — 36415 COLL VENOUS BLD VENIPUNCTURE: CPT | Performed by: INTERNAL MEDICINE

## 2019-12-26 PROCEDURE — 83036 HEMOGLOBIN GLYCOSYLATED A1C: CPT | Performed by: INTERNAL MEDICINE

## 2019-12-26 PROCEDURE — 80053 COMPREHEN METABOLIC PANEL: CPT | Performed by: INTERNAL MEDICINE

## 2019-12-26 PROCEDURE — 82043 UR ALBUMIN QUANTITATIVE: CPT | Performed by: INTERNAL MEDICINE

## 2019-12-26 PROCEDURE — 80061 LIPID PANEL: CPT | Performed by: INTERNAL MEDICINE

## 2019-12-26 RX ORDER — GABAPENTIN 300 MG/1
300 CAPSULE ORAL 3 TIMES DAILY
Qty: 90 CAPSULE | Refills: 3
Start: 2019-12-26 | End: 2019-12-26

## 2019-12-26 RX ORDER — SIMVASTATIN 20 MG
20 TABLET ORAL AT BEDTIME
Qty: 90 TABLET | Refills: 3 | Status: SHIPPED | OUTPATIENT
Start: 2019-12-26 | End: 2020-12-20

## 2019-12-26 RX ORDER — NEBIVOLOL 5 MG/1
TABLET ORAL
COMMUNITY
Start: 2019-12-26 | End: 2019-12-26 | Stop reason: ALTCHOICE

## 2019-12-26 RX ORDER — LANCETS
EACH MISCELLANEOUS
Qty: 200 EACH | Refills: 3 | Status: SHIPPED | OUTPATIENT
Start: 2019-12-26 | End: 2022-04-20

## 2019-12-26 RX ORDER — LOSARTAN POTASSIUM 50 MG/1
50 TABLET ORAL DAILY
Qty: 90 TABLET | Refills: 3 | Status: SHIPPED | OUTPATIENT
Start: 2019-12-26 | End: 2020-01-27 | Stop reason: DRUGHIGH

## 2019-12-26 RX ORDER — LOSARTAN POTASSIUM 25 MG/1
25 TABLET ORAL DAILY
COMMUNITY
Start: 2019-12-26 | End: 2019-12-26 | Stop reason: DRUGHIGH

## 2019-12-26 RX ORDER — TRAMADOL HYDROCHLORIDE 50 MG/1
50 TABLET ORAL 3 TIMES DAILY
Qty: 90 TABLET | Refills: 0 | Status: SHIPPED | OUTPATIENT
Start: 2019-12-26 | End: 2020-01-24

## 2019-12-26 RX ORDER — TRAMADOL HYDROCHLORIDE 50 MG/1
50 TABLET ORAL 3 TIMES DAILY
Qty: 10 TABLET | Refills: 0 | COMMUNITY
Start: 2019-12-26 | End: 2019-12-26

## 2019-12-26 RX ORDER — AMLODIPINE BESYLATE 5 MG/1
5 TABLET ORAL DAILY
Qty: 90 TABLET | Refills: 3 | Status: SHIPPED | OUTPATIENT
Start: 2019-12-26 | End: 2020-12-20

## 2019-12-26 RX ORDER — GABAPENTIN 300 MG/1
300 CAPSULE ORAL 3 TIMES DAILY
Qty: 90 CAPSULE | Refills: 11 | Status: SHIPPED | OUTPATIENT
Start: 2019-12-26 | End: 2020-12-20

## 2019-12-26 RX ORDER — NEBIVOLOL 2.5 MG/1
2.5 TABLET ORAL DAILY
Qty: 90 TABLET | Refills: 3 | Status: SHIPPED | OUTPATIENT
Start: 2019-12-26 | End: 2020-01-27

## 2019-12-26 ASSESSMENT — MIFFLIN-ST. JEOR: SCORE: 1311.01

## 2019-12-26 NOTE — PROGRESS NOTES
Subjective     Essie Huddleston is a 74 year old female who presents to clinic today for the following health issues:    HPI   Chief Complaint   Patient presents with     Re-establish Care     Patient has been in [facility] for the last 3 years, would like to Re-Establish Care with Dr. Driscoll re: Diabetes and Medication review       Pt's past medical history, family history, habits, medications and allergies were reviewed with the patient today.  See snap shot for  HCM status. Most recent lab results reviewed with pt. Problem list and histories reviewed & adjusted, as indicated.  Additional history as below:    Now [place] a green card and [place] living [place]. Had been living in [facility] previously.  No records from [facility] to review  Sugar 190 in AM 1 week ago. Rarely checking sugars. Use to be on Glipizide but stopped it on her own   Has been eating higher carb diet recently  No eye exam in past 1 year. Last 2017. HAs reading [employer] for small print  Has chronic back pain with some radicular sx to the mid right calf. Some radiation pain to the left lumbar area. No LE weakness. No falls. No true B/B incontinence but some urge sensation  sx with bladder at times  Seen today with her son Jose    Review of Systems:  CONSTITUTIONAL: NEGATIVE for fever, chills. Weight down 6 pounds in 18 mos  INTEGUMENTARY/SKIN: NEGATIVE for worrisome rashes, moles or lesions  EYES: NEGATIVE for vision changes or irritation  ENT/MOUTH: NEGATIVE for ear, mouth and throat problems  RESP: NEGATIVE for significant cough or SOB  BREAST: NEGATIVE for masses, tenderness or discharge.   CV: NEGATIVE for chest pain, current palpitations (since started bystolic) or recent peripheral edema. Slight 1 month ago  GI: NEGATIVE for nausea, abdominal pain, heartburn, or change in bowel habits. No previous colon cancer screening  : NEGATIVE for frequency, dysuria, or hematuria. Nocturia x1-2. Slight bothersome. Coffee and tea 2-3 cups  "total/day  MUSCULOSKELETAL:  POSITIVE for chronic LBP (R>L) and RLE radiculopathy. No previous injection therapy  NEURO: NEGATIVE for weakness, dizziness or paresthesias  ENDOCRINE: NEGATIVE for temperature intolerance. POSITIVE for DM and lipids. No recent labs to review. Off Glipizide for 1 year. No side effects   HEME: NEGATIVE for bleeding problems  PSYCHIATRIC: NEGATIVE for changes in mood or affect       Additional ROS:   Constitutional, HEENT, Cardiovascular, Pulmonary, GI and , Neuro, MSK and Psych review of systems/symptoms are otherwise negative or unchanged from previous, except as noted above.      OBJECTIVE:  /80   Pulse 68   Temp 98.3  F (36.8  C) (Temporal)   Resp 16   Ht 1.575 m (5' 2\")   Wt 85.3 kg (188 lb)   SpO2 98%   BMI 34.39 kg/m     Estimated body mass index is 34.39 kg/m  as calculated from the following:    Height as of this encounter: 1.575 m (5' 2\").    Weight as of this encounter: 85.3 kg (188 lb).     Neck: no adenopathy. Thyroid normal to palpation. No bruits  Pulm: Lungs clear to auscultation   CV: Regular rates and rhythm  GI: Soft, obese, nontender, Normal active bowel sounds, No hepatosplenomegaly or masses palpable  Ext: Peripheral pulses intact. No edema.  Neuro: Normal strength and tone, sensory exam grossly normal  Back: Mild tenderness to palpation right paralumbar area. Neg SLRT bilaterally.      Assessment/Plan: (See plan discussion below for further details)   1. Type 2 diabetes mellitus with diabetic nephropathy, with long-term current use of insulin (H)   Rarely checking blood sugars.  No recent lab data to review re: overall control status.  Will continue current medications for now pending lab results.  Labs as ordered. Due for eye exam  - TSH with free T4 reflex  - Albumin Random Urine Quantitative with Creat Ratio  - Lipid panel reflex to direct LDL Fasting  - Comprehensive metabolic panel  - Hemoglobin A1c  - insulin detemir (LEVEMIR FLEXTOUCH) 100 " UNIT/ML pen; 35 units injected daily in PM  Dispense: 45 mL; Refill: 3  - metFORMIN (GLUCOPHAGE) 1000 MG tablet; Take 1 tablet (1,000 mg) by mouth 2 times daily (with meals)  Dispense: 180 tablet; Refill: 3  - insulin pen needle (RELION MINI PEN NEEDLES) 31G X 6 MM miscellaneous; USE 1 PEN NEEDLE DAILY OR AS DIRECTED  Dispense: 100 each; Refill: 3  - blood glucose (NO BRAND SPECIFIED) test strip; Use to test blood sugar 2 times daily or as directed.  Dispense: 200 strip; Refill: 3  - blood glucose calibration (NO BRAND SPECIFIED) solution; Use to calibrate blood glucose monitor as needed as directed.  Dispense: 1 Bottle; Refill: 11  - blood glucose monitoring (NO BRAND SPECIFIED) meter device kit; Use to test blood sugar 2 times daily or as directed.  Dispense: 1 kit; Refill: 0  - thin (NO BRAND SPECIFIED) lancets; Use to test blood sugar 2 times daily or as directed.  Dispense: 200 each; Refill: 3    2. Chronic right-sided low back pain with right-sided sciatica  Patient has been getting tramadol and gabapentin per her previous physician in [facility]. NO records. Will continue for now. Xray as ordered given Christia symptoms.  Will refer to physical therapy in addition.  Will reassess in 1 month's time.  Goal will be to wean patient off of tramadol possible.  If not, will  then the patient signed a controlled substance agreement for narcotic use  - XR Lumbar Spine 2/3 Views; Future  - gabapentin (NEURONTIN) 300 MG capsule; Take 1 capsule (300 mg) by mouth 3 times daily  Dispense: 90 capsule; Refill: 11  - traMADol (ULTRAM) 50 MG tablet; Take 1 tablet (50 mg) by mouth 3 times daily  Dispense: 90 tablet; Refill: 0  - [facility] PT, HAND, AND CHIROPRACTIC REFERRAL; Future    3. Essential hypertension with goal blood pressure less than 130/80  Systolic blood pressure mildly above goal.  Increase losartan from 25mg -> 50mg daily.  Continue other  blood pressure medications for now at same dosages.  Recheck blood pressure 1  month  - losartan (COZAAR) 50 MG tablet; Take 1 tablet (50 mg) by mouth daily  Dispense: 90 tablet; Refill: 3  - amLODIPine (NORVASC) 5 MG tablet; Take 1 tablet (5 mg) by mouth daily  Dispense: 90 tablet; Refill: 3  - nebivolol (BYSTOLIC) 2.5 MG tablet; Take 1 tablet (2.5 mg) by mouth daily  Dispense: 90 tablet; Refill: 3    4. Hyperlipidemia LDL goal <100  On statin therapy.  No recent lipids.  Labs today as ordered.  Continue current medication for now pending lab results  - simvastatin (ZOCOR) 20 MG tablet; Take 1 tablet (20 mg) by mouth At Bedtime  Dispense: 90 tablet; Refill: 3  - Lipid panel reflex to direct LDL Fasting  - Comprehensive metabolic panel      5. Palpitations  Patient denies symptoms with current medication  - nebivolol (BYSTOLIC) 2.5 MG tablet; Take 1 tablet (2.5 mg) by mouth daily  Dispense: 90 tablet; Refill: 3    6. Colon cancer screening  Declines  colonoscopy despite MD recommendation for the procedure. FIT test ordered. Reviewed the reduced sensitivity of the FIT test for colon cancer screening compared to colonoscopy and pt states understanding of this. Patient to inform physician in the future if willing to undergo future colonoscopy.   - Fecal colorectal cancer screen (FIT); Future    7. Encounter for screening mammogram for breast cancer  Overdue for breast cancer screening  - *MA Screening Digital Bilateral; Future    8. Need for prophylactic vaccination and inoculation against influenza  - INFLUENZA (HIGH DOSE) 3 VALENT VACCINE [50474]  - ADMIN INFLUENZA (For MEDICARE Patients ONLY) []    9. Need for hepatitis C screening test  Pt meets criteria for hepatitis C screening based on birth year 1944-65. Discussed pro/cons of Hep C screening/treatment and recs of USPTF. Pt agreeable to screening  - **Hepatitis C Screen Reflex to RNA FUTURE anytime    Plan discussion:   Increase losartan to 50 mg daily for blood pressure   Change Nebivolol to 2.5mg tab, 1 tab daily for blood pressure  and palpitations  Continue other medications.  Prescriptions refilled  Labs as ordered including FIT stool test. Inform MD in future when willing to have a colonoscopy and referral will be placed  Eye exam appt  Mammogram appointment  See me in 1 month for follow-up of blood pressure and follow-up other medical issues, labs, etc         Labs as ordered   Mammogram appt  Will address refills later today  Return FIT stool kit to lab  Eye exam appt          Luigi Driscoll MD  Internal Medicine Department  [facility]    (Chart documentation was completed, in part, with [employer] voice-recognition software. Even though reviewed, some grammatical, spelling, and word errors may remain.)

## 2019-12-27 PROCEDURE — 82274 ASSAY TEST FOR BLOOD FECAL: CPT | Performed by: INTERNAL MEDICINE

## 2019-12-28 DIAGNOSIS — Z12.11 COLON CANCER SCREENING: ICD-10-CM

## 2019-12-28 LAB — HEMOCCULT STL QL IA: NEGATIVE

## 2019-12-29 PROBLEM — R00.2 PALPITATIONS: Status: ACTIVE | Noted: 2019-12-29

## 2019-12-29 PROBLEM — G89.29 CHRONIC RIGHT-SIDED LOW BACK PAIN WITH RIGHT-SIDED SCIATICA: Status: ACTIVE | Noted: 2019-12-29

## 2019-12-29 PROBLEM — M54.41 CHRONIC RIGHT-SIDED LOW BACK PAIN WITH RIGHT-SIDED SCIATICA: Status: ACTIVE | Noted: 2019-12-29

## 2019-12-29 PROBLEM — I10 ESSENTIAL HYPERTENSION WITH GOAL BLOOD PRESSURE LESS THAN 130/80: Status: ACTIVE | Noted: 2019-12-29

## 2019-12-30 NOTE — PATIENT INSTRUCTIONS
Increase losartan to 50 mg daily for blood pressure   Change Nebivolol to 2.5mg tab, 1 tab daily for blood pressure and palpitations  Continue other medications.  Prescriptions refilled  Labs as ordered including FIT stool test. Inform MD in future when willing to have a colonoscopy and referral will be placed  Eye exam appt  Mammogram appointment  See me in 1 month for follow-up of blood pressure and follow-up other medical issues, labs, etc

## 2020-01-23 DIAGNOSIS — M54.41 CHRONIC RIGHT-SIDED LOW BACK PAIN WITH RIGHT-SIDED SCIATICA: ICD-10-CM

## 2020-01-23 DIAGNOSIS — G89.29 CHRONIC RIGHT-SIDED LOW BACK PAIN WITH RIGHT-SIDED SCIATICA: ICD-10-CM

## 2020-01-23 NOTE — TELEPHONE ENCOUNTER
traMADol (ULTRAM) 50 MG tablet      Last Written Prescription Date:  12/26/2019  Last Fill Quantity: 90,   # refills: 0  Last Office Visit: 12/26/2019  Future Office visit:       Routing refill request to provider for review/approval because:  Drug not on the [facility], [facility] or [facility] refill protocol or controlled substance

## 2020-01-24 RX ORDER — TRAMADOL HYDROCHLORIDE 50 MG/1
50 TABLET ORAL 3 TIMES DAILY
Qty: 90 TABLET | Refills: 0 | Status: SHIPPED | OUTPATIENT
Start: 2020-01-24 | End: 2020-01-27

## 2020-01-24 NOTE — TELEPHONE ENCOUNTER
Pt was to see me back in f/u in January 2020 but was no show for appt. Pain med refilled for 30 days more only. RFs of tramadol will NOT be considered again without f/u appt, signing if controled substance agreement, etc.   Pt was also referred to [facility] for PT at last appt but no appt made.   Call pt/daughter Pule  and get f/u appt scheduled with me in the next 30 days and also give tele number for [facility] for PT appt to be scheduled re: back  [phone].

## 2020-01-27 ENCOUNTER — OFFICE VISIT (OUTPATIENT)
Dept: INTERNAL MEDICINE | Facility: CLINIC | Age: 75
End: 2020-01-27

## 2020-01-27 VITALS
DIASTOLIC BLOOD PRESSURE: 86 MMHG | RESPIRATION RATE: 16 BRPM | SYSTOLIC BLOOD PRESSURE: 132 MMHG | HEART RATE: 82 BPM | BODY MASS INDEX: 33.65 KG/M2 | WEIGHT: 184 LBS | TEMPERATURE: 98.4 F | OXYGEN SATURATION: 98 %

## 2020-01-27 DIAGNOSIS — Z79.4 TYPE 2 DIABETES MELLITUS WITH DIABETIC NEPHROPATHY, WITH LONG-TERM CURRENT USE OF INSULIN (H): Primary | ICD-10-CM

## 2020-01-27 DIAGNOSIS — M54.41 CHRONIC RIGHT-SIDED LOW BACK PAIN WITH RIGHT-SIDED SCIATICA: ICD-10-CM

## 2020-01-27 DIAGNOSIS — E11.21 TYPE 2 DIABETES MELLITUS WITH DIABETIC NEPHROPATHY, WITH LONG-TERM CURRENT USE OF INSULIN (H): Primary | ICD-10-CM

## 2020-01-27 DIAGNOSIS — G89.29 CHRONIC RIGHT-SIDED LOW BACK PAIN WITH RIGHT-SIDED SCIATICA: ICD-10-CM

## 2020-01-27 DIAGNOSIS — I10 HYPERTENSION, UNSPECIFIED TYPE: ICD-10-CM

## 2020-01-27 PROCEDURE — 99214 OFFICE O/P EST MOD 30 MIN: CPT | Performed by: INTERNAL MEDICINE

## 2020-01-27 PROCEDURE — 99207 C FOOT EXAM  NO CHARGE: CPT | Mod: 25 | Performed by: INTERNAL MEDICINE

## 2020-01-27 RX ORDER — LOSARTAN POTASSIUM 100 MG/1
100 TABLET ORAL DAILY
Qty: 90 TABLET | Refills: 3 | Status: SHIPPED | OUTPATIENT
Start: 2020-01-27 | End: 2021-03-14

## 2020-01-27 RX ORDER — TRAMADOL HYDROCHLORIDE 50 MG/1
50 TABLET ORAL DAILY PRN
Qty: 90 TABLET | Refills: 0
Start: 2020-01-27 | End: 2020-03-09

## 2020-01-27 NOTE — PROGRESS NOTES
Subjective     Essie Huddleston is a 74 year old female who presents to clinic today for the following health issues:    HPI   Diabetes Follow-up    How often are you checking your blood sugar? Two times daily  Blood sugar testing frequency justification:  A1c elevated  What time of day are you checking your blood sugars (select all that apply)?  Before meals and After meals  Have you had any blood sugars above 200?  Yes -after eating   Have you had any blood sugars below 70?  No    What symptoms do you notice when your blood sugar is low?  None    What concerns do you have today about your diabetes? None     Do you have any of these symptoms? (Select all that apply) Yes-tingling    Have you had a diabetic eye exam in the last 12 months? No                Hyperlipidemia Follow-Up      Are you regularly taking any medication or supplement to lower your cholesterol?   Yes- Simvastatin    Are you having muscle aches or other side effects that you think could be caused by your cholesterol lowering medication?  No    Hypertension Follow-up      Do you check your blood pressure regularly outside of the clinic? Yes     Are you following a low salt diet? No    Are your blood pressures ever more than 140 on the top number (systolic) OR more   than 90 on the bottom number (diastolic), for example 140/90? Yes    BP Readings from Last 2 Encounters:   01/27/20 132/86   12/26/19 138/80     Hemoglobin A1C (%)   Date Value   12/26/2019 7.6 (H)   08/05/2016 9.0 (H)     LDL Cholesterol Calculated (mg/dL)   Date Value   12/26/2019 74   08/05/2016 89         How many servings of fruits and vegetables do you eat daily?  4 or more    On average, how many sweetened beverages do you drink each day (Examples: soda, juice, sweet tea, etc.  Do NOT count diet or artificially sweetened beverages)?   0-4    How many days per week do you exercise enough to make your heart beat faster? 3 or less    How many minutes a day do you exercise enough to  make your heart beat faster? 9 or less    How many days per week do you miss taking your medication? 0      Pt's past medical history, family history, habits, medications and allergies were reviewed with the patient today.  See snap shot for  HCM status. Most recent lab results reviewed with pt. Problem list and histories reviewed & adjusted, as indicated.  Additional history as below:    Seen with her daughter today  Also f/u re: chronic back pain. Using tramadol one tab once a day now. Less than previous. Would like to do PT. Radiation pain to distal RLE. No B/B incontinence or LE weakness  Seen 1 month ago [place] being out of [place] > [place]. Instructions then as below:    Plan discussion:   Increase losartan to 50 mg daily for blood pressure   Change Nebivolol to 2.5mg tab, 1 tab daily for blood pressure and palpitations  Continue other medications.  Prescriptions refilled  Labs as ordered including FIT stool test. Inform MD in future when willing to have a colonoscopy and referral will be placed  Eye exam appt  Mammogram appointment  See me in 1 month for follow-up of blood pressure and follow-up other medical issues, labs, etc      Component      Latest Ref Rng & Units 12/26/2019 12/27/2019   Sodium      133 - 144 mmol/L 141    Potassium      3.4 - 5.3 mmol/L 4.7    Chloride      94 - 109 mmol/L 106    Carbon Dioxide      20 - 32 mmol/L 33 (H)    Anion Gap      3 - 14 mmol/L 2 (L)    Glucose      70 - 99 mg/dL 92    Urea Nitrogen      7 - 30 mg/dL 13    Creatinine      0.52 - 1.04 mg/dL 0.67    GFR Estimate      >60 mL/min/1.73:m2 87    GFR Estimate If Black      >60 mL/min/1.73:m2 >90    Calcium      8.5 - 10.1 mg/dL 9.4    Bilirubin Total      0.2 - 1.3 mg/dL 0.3    Albumin      3.4 - 5.0 g/dL 3.8    Protein Total      6.8 - 8.8 g/dL 7.2    Alkaline Phosphatase      40 - 150 U/L 64    ALT      0 - 50 U/L 24    AST      0 - 45 U/L 12    Cholesterol      <200 mg/dL 169    Triglycerides      <150 mg/dL 92   "  HDL Cholesterol      >49 mg/dL 77    LDL Cholesterol Calculated      <100 mg/dL 74    Non HDL Cholesterol      <130 mg/dL 92    Creatinine Urine      mg/dL 16    Albumin Urine mg/L      mg/L <5    Albumin Urine mg/g Cr      0 - 25 mg/g Cr Unable to calculate due to low value    Hepatitis C Antibody      NR:Nonreactive Nonreactive    TSH      0.40 - 4.00 mU/L 3.14    Hemoglobin A1C      0 - 5.6 % 7.6 (H)    Occult Blood Scn FIT      NEG:Negative  Negative      Sugars AM  < 120 and  200  in PM  Denies CP, SOB, abdominal pain, polyuria, polydipsia, vision changes, extremity numbness/parasthesias or skin problems.  Did not fill Rx for Bystolic as nonformulary    Additional ROS:   Constitutional, HEENT, Cardiovascular, Pulmonary, GI and , Neuro, MSK and Psych review of systems/symptoms are otherwise negative or unchanged from previous, except as noted above.      OBJECTIVE:  /86   Pulse 82   Temp 98.4  F (36.9  C) (Temporal)   Resp 16   Wt 83.5 kg (184 lb)   SpO2 98%   BMI 33.65 kg/m     Estimated body mass index is 33.65 kg/m  as calculated from the following:    Height as of 12/26/19: 1.575 m (5' 2\").    Weight as of this encounter: 83.5 kg (184 lb).      Neck: no adenopathy. Thyroid normal to palpation. No bruits  Pulm: Lungs clear to auscultation   CV: Regular rates and rhythm  GI: Soft, obese, nontender, Normal active bowel sounds, No hepatosplenomegaly or masses palpable  Ext: Peripheral pulses intact. No edema.  Neuro: Normal strength and tone, sensory exam grossly normal  Back: Tenderness to palpation right.  paralumbar area. Neg SLRT bilaterally.        Assessment/Plan: (See plan discussion below for further details)  1. Type 2 diabetes mellitus with diabetic nephropathy, with long-term current use of insulin (H)  Needs a little improved control of later day blood sugars.  WIll try to improve diet with same meds for now. If A1C remains > 7.5, will add low dose DO vs med like   Invokana depending " on cost  - insulin detemir (LEVEMIR FLEXTOUCH) 100 UNIT/ML pen; 34 units injected daily in PM  Dispense: 45 mL; Refill: 3  - Hemoglobin A1c; Future  - FOOT EXAM    2. Chronic right-sided low back pain with right-sided sciatica  Controlled. Pt using less Tramadol now. CSA signed today. Will also refer to [facility] for PT with goal of reducing tramadol use further in future  - traMADol (ULTRAM) 50 MG tablet; Take 1 tablet (50 mg) by mouth daily as needed for severe pain  Dispense: 90 tablet; Refill: 0  - [facility] PT, HAND, AND CHIROPRACTIC REFERRAL; Future    3. Hypertension, unspecified type  Needs improved control since off of Bystolic. Will increase Losartan  - losartan (COZAAR) 100 MG tablet; Take 1 tablet (100 mg) by mouth daily  Dispense: 90 tablet; Refill: 3    Plan discussion:   Stay off of nebivolol  Increase Losartan to 50mg tab ,2 tabs (100mg) daily until run out. Then change to 100mg tab, 1 tab daily for blood pressure control  Tramadol 1 tab daily as needed for pain   Referral to [facility] for physical therapy. [facility] 3[facility] floor   Nonfasting A1C lab for diabetes in early/mid April 2020 as previously ordered   Controlled substance agreement signed  See me in 3 months or earlier as needed depending on diabetic lab results       Luigi Driscoll MD  Internal Medicine Department  [facility]    (Chart documentation was completed, in part, with [employer] voice-recognition software. Even though reviewed, some grammatical, spelling, and word errors may remain.)

## 2020-01-27 NOTE — LETTER
[facility]  01/27/20    Patient: Essie Huddleston  YOB: 1945  Medical Record Number: [id number]                                                                  Opioid / Opioid Plus Controlled Substance Agreement    I understand that my care provider has prescribed an opioid (narcotic) controlled substance to help manage my condition(s). I am taking this medicine to help me function or work. I know this is strong medicine, and that it can cause serious side effects. Opioid medicine can be sedating, addicting and may cause a dependency on the drug. They can affect my ability to drive or think, and cause depression. They need to be taken exactly as prescribed. Combining opioids with certain medicines or chemicals (such as cocaine, sedatives and tranquilizers, sleeping pills, meth) can be dangerous or even fatal. Also, if I stop opioids suddenly, I may have severe withdrawal symptoms. Last, I understand that opioids do not work for all types of pain nor for all patients. If not helpful, I may be asked to stop them.        The risks, benefits, and side effects of these medicine(s) were explained to me. I agree that:    1. I will take part in other treatments as advised by my care team. This may be psychiatry or counseling, physical therapy, behavioral therapy, group treatment or a referral to a pain clinic. I will reduce or stop my medicine when my care team tells me to do so.  2. I will take my medicines as prescribed. I will not change the dose or schedule unless my care team tells me to. There will be no refills if I  run out early.   I may be contactedwithout warning and asked to complete a urine drug test or pill count at any time.   3. I will keep all my appointments, and understand this is part of the monitoring of opioids. My care team may require an office visit for EVERY opioid/controlled substance refill. If I miss appointments or don t follow instructions, my care team may  stop my medicine.  4. I will not ask other providers to prescribe controlled substances, and I will not accept controlled substances from other people. If I need another prescribed controlled substance for a new reason, I will tell my care team within 1 business day.  5. I will use one pharmacy to fill all of my controlled substance prescriptions, and it is up to me to make sure that I do not run out of my medicines on weekends or holidays. If my care team is willing to refill my opioid prescription without a visit, I must request refills only during office hours, refills may take up to 3 days to process, and it may take up to 5 to 7 days for my medicine to be mailed and ready at my pharmacy. Prescriptions will not be mailed anywhere except my pharmacy.        [id number]  Rev 12/18         Registration to scan to EHR                             Page 1 of 2               Controlled Substance Agreement Opioid        [facility]  01/27/20  Patient: Essie Huddleston  YOB: 1945  Medical Record Number: [id number]                                                                  6. I am responsible for my prescriptions. If the medicine/prescription is lost or stolen, it will not be replaced. I also agree not to share controlled substance medicines with anyone.  7. I agree to not use ANY illegal or recreational drugs. This includes marijuana, cocaine, bath salts or other drugs. I agree not to use alcohol unless my care team says I may.          I agree to give urine samples whenever asked. If I don t give a urine sample, the care team may stop my medicine.    8. If I enroll in the [place] Medical Marijuana program, I will tell my care team. I will also sign an agreement to share my medical records with my care team.   9. I will bring in my list of medicines (or my medicine bottles) each time I come to the clinic.   10. I will tell my care team right away if I become pregnant or have a new  medical problem treated outside of my regular clinic.  11. I understand that this medicine can affect my thinking and judgment. It may be unsafe for me to drive, use machinery and do dangerous tasks. I will not do any of these things until I know how the medicine affects me. If my dose changes, I will wait to see how it affects me. I will contact my care team if I have concerns about medicine side effects.    I understand that if I do not follow any of the conditions above, my prescriptions or treatment may be stopped.      I agree that my provider, clinic care team, and pharmacy may work with any city, state or federal law enforcement agency that investigates the misuse, sale, or other diversion of my controlled medicine. I will allow my provider to discuss my care with or share a copy of this agreement with any other treating provider, pharmacy or emergency room where I receive care. I agree to give up (waive) any right of privacy or confidentiality with respect to these consents.     I have read this agreement and have asked questions about anything I did not understand.      ________________________________________________________________________  Patient signature - Date/Time -  Essie Huddleston                                      ________________________________________________________________________  Witness signature                                                            ________________________________________________________________________  Provider signature - Luigi Driscoll MD      [id number]  Rev 12/18         Registration to scan to EHR                         Page 2 of 2                   Controlled Substance Agreement Opioid           Page 1 of 2  Opioid Pain Medicines (also known as Narcotics)  What You Need to Know    What are opioids?   Opioids are pain medicines that must be prescribed by a doctor.  They are also known as narcotics.    Examples are:     morphine (MS Contin, Casi)    oxycodone  (Oxycontin)    oxycodone and acetaminophen (Percocet)    hydrocodone and acetaminophen (Vicodin, Norco)     fentanyl patch (Duragesic)     hydromorphone (Dilaudid)     methadone     What do opioids do well?   Opioids are best for short-term pain after a surgery or injury. They also work well for cancer pain. Unlike other pain medicines, they do not cause liver or kidney failure or ulcers. They may help some people with long-lasting (chronic) pain.     What do opioids NOT do well?   Opioids never get rid of pain entirely, and they do not work well for most patients with chronic pain. Opioids do not reduce swelling, one of the causes of pain. They also don t work well for nerve pain.                           For informational purposes only.  Not to replace the advice of your care provider.  Copyright [facility]. All right reserved. [employer] [id number] 02/18.      Page 2 of 2    Risks and side effects   Talk to your doctor before you start or decide to keep taking one of these medicines. Side effects include:    Lowering your breathing rate enough to cause death    Overdose, including death, especially if taking higher than prescribed doses    Long-term opioid use    Worse depression symptoms; less pleasure in things you usually enjoy    Feeling tired or sluggish    Slower thoughts or cloudy thinking    Being more sensitive to pain over time; pain is harder to control    Trouble sleeping or restless sleep    Changes in hormone levels (for example, less testosterone)    Changes in sex drive or ability to have sex    Constipation    Unsafe driving    Itching and sweating    Feeling dizzy    Nausea, vomiting and dry mouth    What else should I know about opioids?  When someone takes opioids for too long or too often, they become dependent. This means that if you stop or reduce the medicine too quickly, you will have withdrawal symptoms.    Dependence is not the same as addiction. Addiction is when  people keep using a substance that harms their body, their mind or their relations with others. If you have a history of drug or alcohol abuse, taking opioids can cause a relapse.    Over time, opioids don t work as well. Most people will need higher and higher doses. The higher the dose, the more serious the side effects. We don t know the long-term effects of opioids.      Prescribed opioids aren't the best way to manage chronic pain    Other ways to manage pain include:      Ibuprofen or acetaminophen.  You should always try this first.      Treat health problems that may be causing pain.      acupuncture or massage, deep breathing, meditation, visual imagery, aromatherapy.      Use heat or ice at the pain site      Physical therapy and exercise      Stop smoking      See a counselor or therapist                                                  People who have used opioids for a long time may have a lower quality of life, worse depression, higher levels of pain and more visits to doctors.    Never share your opioids with others. Be sure to store opioids in a secure place, locked if possible.Young children can easily swallow them and overdose.     You can overdose on opioids.  Signs of overdose include decrease or loss of consciousness, slowed breathing, trouble waking and blue lips.  If someone is worried about overdose, they should call 911.    If you are at risk for overdose, you may get naloxone (Narcan, a medicine that reverses the effects of opioids.  If you overdose, a friend or family member can give you Narcan while waiting for the ambulance.  They need to know the signs of overdose and how to give Narcan.    While you're taking opioids:    Don't use alcohol or street drugs. Taking them together can cause death.    Don't take any of these medicines unless your doctor says its okay.  Taking these with opioids can cause death.    Benzodiazepines (such as lorazepam         or diazepam)    Muscle relaxers  (such as cyclobenzaprine)    sleeping pills    other opioids    Safe disposal of opioids  Find your area drug take-back program, your pharmacy mail-back program, buy a special disposal bag (such as Deterra) from your pharmacy or flush them down the toilet.  Use the guidelines at:  www.fda.gov/drugs/resourcesforyou

## 2020-01-27 NOTE — PATIENT INSTRUCTIONS
Stay off of nebivolol  Increase Losartan to 50mg tab ,2 tabs (100mg) daily until run out. Then change to 100mg tab, 1 tab daily for blood pressure control  Tramadol 1 tab daily as needed for pain   Referral to [facility] for physical therapy. They will call to [facility] 3rd floor   Nonfasting A1C lab for diabetes in early/mid April 2020 as previously ordered   Controlled substance agreement signed  See me in 3 months or earlier as needed depending on diabetic lab results

## 2020-02-01 NOTE — MR AVS SNAPSHOT
"              After Visit Summary   5/17/2018    Essie Huddleston    MRN: [id number]           Patient Information     Date Of Birth          1945        Visit Information        Provider Department      5/17/2018 8:10 AM [facility]        Today's Diagnoses     Chronic bilateral low back pain without sciatica        Upper back pain on right side           Follow-ups after your visit        Your next 10 appointments already scheduled     May 21, 2018  9:30 AM CDT   [facility] with Xiao Barbour OT   [facility]    [address] [place] [id number] [phone]              Who to contact     If you have questions or need follow up information about today's clinic visit or your schedule please contact [facility] directly at [phone].  Normal or non-critical lab and imaging results will be communicated to you by MoodMehart, letter or phone within 4 business days after the clinic has received the results. If you do not hear from us within 7 days, please contact the clinic through MoodMehart or phone. If you have a critical or abnormal lab result, we will notify you by phone as soon as possible.  Submit refill requests through Convio or call your pharmacy and they will forward the refill request to us. Please allow 3 business days for your refill to be completed.          Additional Information About Your Visit        MyChart Information     Convio lets you send messages to your doctor, view your test results, renew your prescriptions, schedule appointments and more. To sign up, go to [email or web address] . Click on \"Log in\" on the left side of the screen, which will take you to the Welcome page. Then click on \"Sign up Now\" on the right side of the page.     You will be asked to enter the access code listed below, as well as some personal " Pt in extreme pain, ERP notified, new orders received, traction at bedside.    information. Please follow the directions to create your username and password.     Your access code is: [email or web address]  Expires: 2018  5:07 PM     Your access code will  in 90 days. If you need help or a new code, please call your [place] clinic or [phone].        Care EveryWhere ID     This is your Care EveryWhere ID. This could be used by other organizations to access your [place] medical records  [id number]         Blood Pressure from Last 3 Encounters:   18 140/85   16 146/82   03/10/15 130/82    Weight from Last 3 Encounters:   18 88.1 kg (194 lb 3.2 oz)   16 86.2 kg (190 lb)   03/10/15 86.6 kg (191 lb)              We Performed the Following     Therapeutic Exercises        Primary Care Provider Office Phone # Fax #    Luigi Driscoll [phone]       [address] [place] [id number]        Equal Access to Services     [facility]: Hadii aad ku hadasho Soomaali, waaxda luqadaha, qaybta kaalmada adeegyada, waxay idiin hayflorencen shanna redd . So [facility] [phone].    ATENCIÓN: Si habla español, tiene a rincon disposición servicios gratuitos de asistencia lingüística. Llame al [phone].    We comply with applicable federal civil rights laws and [place] laws. We do not discriminate on the basis of race, color, national origin, age, disability, sex, sexual orientation, or gender identity.            Thank you!     Thank you for choosing [facility]  for your care. Our goal is always to provide you with excellent care. Hearing back from our patients is one way we can continue to improve our services. Please take a few minutes to complete the written survey that you may receive in the mail after your visit with us. Thank you!             Your Updated Medication List - Protect others around you: Learn how to safely use, store and throw away your medicines at www.disposemymeds.org.          This  list is accurate as of 5/17/18 12:46 PM.  Always use your most recent med list.                   Brand Name Dispense Instructions for use Diagnosis    amLODIPine 5 MG tablet    NORVASC    90 tablet    Take 1 tablet (5 mg) by mouth daily    Essential hypertension with goal blood pressure less than 130/80       aspirin 81 MG tablet     10 tablet    Take 1 tablet (81 mg) by mouth daily    HTN (hypertension), Type 2 diabetes, HbA1c goal < 7% (H)       glipiZIDE 5 MG tablet    GLUCOTROL    180 tablet    Take 1 tablet (5 mg) by mouth 2 times daily Before meals    Type 2 diabetes mellitus with diabetic nephropathy (H)       insulin detemir 100 UNIT/ML injection    LEVEMIR FLEXTOUCH    3 Month    30-35 units daily    Type 2 diabetes mellitus with diabetic nephropathy (H)       losartan-hydrochlorothiazide 100-12.5 MG per tablet    HYZAAR    30 tablet    TAKE ONE TABLET BY MOUTH ONCE DAILY    Essential hypertension with goal blood pressure less than 130/80       metFORMIN 1000 MG tablet    GLUCOPHAGE    180 tablet    Take 1 tablet (1,000 mg) by mouth 2 times daily (with meals)    Type 2 diabetes mellitus with diabetic nephropathy (H)       NEW MED      Take 1 tablet by mouth daily. Cachnerve Tablets.        order for DME     200 strip    Equipment being ordered:  Glucometer test strips per insurance and pt type glucometer. Check sugars two times a day    Type 2 diabetes mellitus with diabetic nephropathy, with long-term current use of insulin (H)       RELION MINI PEN NEEDLES 31G X 6 MM   Generic drug:  insulin pen needle     100 each    USE 1 PEN NEEDLE DAILY OR AS DIRECTED    Type 2 diabetes mellitus with diabetic nephropathy, with long-term current use of insulin (H)       simvastatin 20 MG tablet    ZOCOR    90 tablet    Take 1 tablet (20 mg) by mouth At Bedtime    Hyperlipidemia LDL goal <100       traMADol 50 MG tablet    ULTRAM    50 tablet    Take 1 tablet (50 mg) by mouth 2 times daily as needed for moderate pain     Back pain       vitamin D 2000 units tablet     100 tablet    Take 2,000 Units by mouth daily.    Vitamin D deficiency

## 2020-02-03 ENCOUNTER — THERAPY VISIT (OUTPATIENT)
Dept: PHYSICAL THERAPY | Facility: CLINIC | Age: 75
End: 2020-02-03
Attending: INTERNAL MEDICINE

## 2020-02-03 DIAGNOSIS — M54.50 CHRONIC BILATERAL LOW BACK PAIN WITHOUT SCIATICA: ICD-10-CM

## 2020-02-03 DIAGNOSIS — M54.41 CHRONIC RIGHT-SIDED LOW BACK PAIN WITH RIGHT-SIDED SCIATICA: ICD-10-CM

## 2020-02-03 DIAGNOSIS — G89.29 CHRONIC BILATERAL LOW BACK PAIN WITHOUT SCIATICA: ICD-10-CM

## 2020-02-03 DIAGNOSIS — G89.29 CHRONIC RIGHT-SIDED LOW BACK PAIN WITH RIGHT-SIDED SCIATICA: ICD-10-CM

## 2020-02-03 PROCEDURE — 97161 PT EVAL LOW COMPLEX 20 MIN: CPT | Mod: GP | Performed by: PHYSICAL THERAPIST

## 2020-02-03 PROCEDURE — 97530 THERAPEUTIC ACTIVITIES: CPT | Mod: GP | Performed by: PHYSICAL THERAPIST

## 2020-02-03 NOTE — PROGRESS NOTES
[facility] Initial Evaluation  Subjective:    Type of problem:  Lumbar   Condition occurred with:  Degenerative joint disease and insidious onset. This is a chronic condition   Problem details: Pt returns to PT with complaints of bilateral low back pain and bilateral posterior thigh pain. Pt seen previously in PT in May of 2018 for similar complaints. Pt reported she will [place] now living in the [place]; previously was here for 6 months at a time. Pt reports back pain is daily, aggravated with sitting more than standing/walking. Sleep is interupted due to back pain. Pain level varies; maximum at 7/10.                             Objective:  System         Lumbar/SI Evaluation  ROM:    AROM Lumbar:   Flexion:          Fingertips to toes; provocation of low back pain with return to upright  Ext:                    Significant loss in standing ; provocation of bilateral low back pain   Side Bend:        Left:  WFL; provocation of R low back pain    Right:  WFL; provocation of right low back pain  Rotation:           Left:     Right:   Side Glide:        Left:     Right:           Lumbar Myotomes:  Lumbar myotomes: Difficulty with squatting to the floor due to LE weakness; requires assistance of UE to lower and return to standing.      L4 (Ankle DF):  Left:  5    Right:  5    S1 (Toe Raise):  Left: 5    Right: 5                                                               General     ROS    Assessment/Plan:    Patient is a 74 year old female with lumbar complaints.    Patient has the following significant findings with corresponding treatment plan.                Diagnosis 1:  Low back pain  Pain -  self management, education and home program  Decreased ROM/flexibility - therapeutic exercise  Decreased function - therapeutic activities    Therapy Evaluation Codes:   1) History comprised of:   Personal factors that impact the plan of care:      Age and Time since onset of symptoms.     Comorbidity factors that impact the plan of care are:      Osteoarthritis.     Medications impacting care: None.  2) Examination of Body Systems comprised of:   Body structures and functions that impact the plan of care:      Lumbar spine.   Activity limitations that impact the plan of care are:      Bending, Lifting, Sitting and Sleeping.  3) Clinical presentation characteristics are:   Stable/Uncomplicated.  4) Decision-Making    Low complexity using standardized patient assessment instrument and/or measureable assessment of functional outcome.  Cumulative Therapy Evaluation is: Low complexity.    Previous and current functional limitations:  (See Goal Flow Sheet for this information)    Short term and Long term goals: (See Goal Flow Sheet for this information)     Communication ability:  Patient appears to be able to clearly communicate and understand verbal and written communication and follow directions correctly.  Treatment Explanation - The following has been discussed with the patient:   RX ordered/plan of care  Anticipated outcomes  Possible risks and side effects  This patient would benefit from PT intervention to resume normal activities.   Rehab potential is good.    Frequency:  1 X week, once daily  Duration:  for 1 weeks  Discharge Plan:  Independent in home treatment program.  Reach maximal therapeutic benefit.    Please refer to the daily flowsheet for treatment today, total treatment time and time spent performing 1:1 timed codes.

## 2020-02-04 NOTE — PROGRESS NOTES
[facility] Initial Evaluation  Subjective:      and reported as 7/10 on pain scale. General health as reported by patient is good. Pertinent medical history includes:  Diabetes and high blood pressure. Other medical history details: pain at night/rest.      Current medications:  High blood pressure medication and pain medication.   Primary job tasks include:  Repetitive tasks.           Patient is Retired.         Oswestry Score: 26 %                 Objective:  System    Physical Exam    General     ROS    Assessment/Plan:

## 2020-02-10 ENCOUNTER — THERAPY VISIT (OUTPATIENT)
Dept: PHYSICAL THERAPY | Facility: CLINIC | Age: 75
End: 2020-02-10

## 2020-02-10 DIAGNOSIS — M54.50 CHRONIC BILATERAL LOW BACK PAIN WITHOUT SCIATICA: ICD-10-CM

## 2020-02-10 DIAGNOSIS — G89.29 CHRONIC BILATERAL LOW BACK PAIN WITHOUT SCIATICA: ICD-10-CM

## 2020-02-10 PROCEDURE — 97110 THERAPEUTIC EXERCISES: CPT | Mod: GP | Performed by: PHYSICAL THERAPIST

## 2020-02-10 PROCEDURE — 97140 MANUAL THERAPY 1/> REGIONS: CPT | Mod: GP | Performed by: PHYSICAL THERAPIST

## 2020-02-10 PROCEDURE — 97112 NEUROMUSCULAR REEDUCATION: CPT | Mod: GP | Performed by: PHYSICAL THERAPIST

## 2020-02-18 ENCOUNTER — THERAPY VISIT (OUTPATIENT)
Dept: PHYSICAL THERAPY | Facility: CLINIC | Age: 75
End: 2020-02-18

## 2020-02-18 DIAGNOSIS — G89.29 CHRONIC BILATERAL LOW BACK PAIN WITHOUT SCIATICA: ICD-10-CM

## 2020-02-18 DIAGNOSIS — M54.50 CHRONIC BILATERAL LOW BACK PAIN WITHOUT SCIATICA: ICD-10-CM

## 2020-02-18 PROCEDURE — 97140 MANUAL THERAPY 1/> REGIONS: CPT | Mod: GP | Performed by: PHYSICAL THERAPIST

## 2020-02-18 PROCEDURE — 97110 THERAPEUTIC EXERCISES: CPT | Mod: GP | Performed by: PHYSICAL THERAPIST

## 2020-02-25 ENCOUNTER — THERAPY VISIT (OUTPATIENT)
Dept: PHYSICAL THERAPY | Facility: CLINIC | Age: 75
End: 2020-02-25

## 2020-02-25 DIAGNOSIS — M54.50 CHRONIC BILATERAL LOW BACK PAIN WITHOUT SCIATICA: ICD-10-CM

## 2020-02-25 DIAGNOSIS — G89.29 CHRONIC BILATERAL LOW BACK PAIN WITHOUT SCIATICA: ICD-10-CM

## 2020-02-25 PROCEDURE — 97140 MANUAL THERAPY 1/> REGIONS: CPT | Mod: GP | Performed by: PHYSICAL THERAPIST

## 2020-02-25 PROCEDURE — 97110 THERAPEUTIC EXERCISES: CPT | Mod: GP | Performed by: PHYSICAL THERAPIST

## 2020-02-27 DIAGNOSIS — R00.2 PALPITATIONS: ICD-10-CM

## 2020-02-27 DIAGNOSIS — Z79.4 TYPE 2 DIABETES MELLITUS WITH DIABETIC NEPHROPATHY, WITH LONG-TERM CURRENT USE OF INSULIN (H): ICD-10-CM

## 2020-02-27 DIAGNOSIS — I10 ESSENTIAL HYPERTENSION WITH GOAL BLOOD PRESSURE LESS THAN 130/80: Primary | ICD-10-CM

## 2020-02-27 DIAGNOSIS — E11.21 TYPE 2 DIABETES MELLITUS WITH DIABETIC NEPHROPATHY, WITH LONG-TERM CURRENT USE OF INSULIN (H): ICD-10-CM

## 2020-02-27 RX ORDER — GLIPIZIDE 5 MG/1
5 TABLET ORAL
COMMUNITY
End: 2020-02-27

## 2020-02-27 RX ORDER — GLIPIZIDE 5 MG/1
5 TABLET ORAL
Qty: 60 TABLET | Refills: 1 | Status: SHIPPED | OUTPATIENT
Start: 2020-02-27 | End: 2020-04-13

## 2020-02-27 NOTE — TELEPHONE ENCOUNTER
"Requested Prescriptions   Pending Prescriptions Disp Refills     metFORMIN (GLUCOPHAGE) 1000 MG tablet 180 tablet 3     Sig: Take 1 tablet (1,000 mg) by mouth 2 times daily (with meals)       Biguanide Agents Passed - 2/27/2020  2:33 PM        Passed - Blood pressure less than 140/90 in past 6 months     BP Readings from Last 3 Encounters:   01/27/20 132/86   12/26/19 138/80   04/18/18 140/85                 Passed - Patient has documented LDL within the past 12 mos.     Recent Labs   Lab Test 12/26/19  1123   LDL 74             Passed - Patient has had a Microalbumin in the past 15 mos.     Recent Labs   Lab Test 12/26/19  1123   MICROL <5   UMALCR Unable to calculate due to low value             Passed - Patient is age 10 or older        Passed - Patient has documented A1c within the specified period of time.     If HgbA1C is 8 or greater, it needs to be on file within the past 3 months.  If less than 8, must be on file within the past 6 months.     Recent Labs   Lab Test 12/26/19  1123   A1C 7.6*             Passed - Patient's CR is NOT>1.4 OR Patient's EGFR is NOT<45 within past 12 mos.     Recent Labs   Lab Test 12/26/19  1123   GFRESTIMATED 87   GFRESTBLACK >90       Recent Labs   Lab Test 12/26/19  1123   CR 0.67             Passed - Patient does NOT have a diagnosis of CHF.        Passed - Medication is active on med list        Passed - Patient is not pregnant        Passed - Patient has not had a positive pregnancy test within the past 12 mos.         Passed - Recent (6 mo) or future (30 days) visit within the authorizing provider's specialty     Patient had office visit in the last 6 months or has a visit in the next 30 days with authorizing provider or within the authorizing provider's specialty.  See \"Patient Info\" tab in inbasket, or \"Choose Columns\" in Meds & Orders section of the refill encounter.            glipiZIDE (GLUCOTROL) 5 MG tablet       Sig: Take 1 tablet (5 mg) by mouth 2 times daily " "(before meals)       Sulfonylurea Agents Passed - 2/27/2020  2:33 PM        Passed - Blood pressure less than 140/90 in past 6 months     BP Readings from Last 3 Encounters:   01/27/20 132/86   12/26/19 138/80   04/18/18 140/85                 Passed - Patient has documented LDL within the past 12 mos.     Recent Labs   Lab Test 12/26/19  1123   LDL 74             Passed - Patient has had a Microalbumin in the past 15 mos.     Recent Labs   Lab Test 12/26/19  1123   MICROL <5   UMALCR Unable to calculate due to low value             Passed - Patient has documented A1c within the specified period of time.     If HgbA1C is 8 or greater, it needs to be on file within the past 3 months.  If less than 8, must be on file within the past 6 months.     Recent Labs   Lab Test 12/26/19  1123   A1C 7.6*             Passed - Medication is active on med list        Passed - Patient is age 18 or older        Passed - No active pregnancy on record        Passed - Patient has a recent creatinine (normal) within the past 12 mos.     Recent Labs   Lab Test 12/26/19  1123   CR 0.67             Passed - Patient has not had a positive pregnancy test within the past 12 mos.        Passed - Recent (6 mo) or future (30 days) visit within the authorizing provider's specialty     Patient had office visit in the last 6 months or has a visit in the next 30 days with authorizing provider or within the authorizing provider's specialty.  See \"Patient Info\" tab in inbasket, or \"Choose Columns\" in Meds & Orders section of the refill encounter.          Signed Prescriptions Disp Refills    glipiZIDE (GLUCOTROL) 5 MG tablet       Sig: Take 5 mg by mouth 2 times daily (before meals)       There is no refill protocol information for this order        "

## 2020-02-27 NOTE — TELEPHONE ENCOUNTER
Daughter calling.      At [facility], wanting medication Dr. Driscoll talked about at last visit for palpitations.  [facility] does not have and it is not listed on current med list.  Per office note:    Change Nebivolol to 2.5mg tab, 1 tab daily for blood pressure and palpitations    Can this script be sent to [facility] also?    Please let daughter know when complete.

## 2020-02-27 NOTE — TELEPHONE ENCOUNTER
Glipizide 5mg BID    Routing refill request to provider for review/approval because:  Medication is reported/historical    Adry PERESN, RN, PHN

## 2020-02-28 RX ORDER — NEBIVOLOL 2.5 MG/1
2.5 TABLET ORAL DAILY
Qty: 90 TABLET | Refills: 3 | Status: SHIPPED | OUTPATIENT
Start: 2020-02-28 | End: 2020-03-06

## 2020-02-28 NOTE — TELEPHONE ENCOUNTER
Rx done for generic Bystolic (nebivolol 2.5mg tab, 1 tab daily. Rx faxed to [facility]. Inform pt/daughter

## 2020-03-03 ENCOUNTER — TELEPHONE (OUTPATIENT)
Dept: INTERNAL MEDICINE | Facility: CLINIC | Age: 75
End: 2020-03-03

## 2020-03-03 DIAGNOSIS — R00.2 PALPITATIONS: Primary | ICD-10-CM

## 2020-03-03 NOTE — TELEPHONE ENCOUNTER
Prior Authorization Retail Medication Request    Medication/Dose: nebivolol (BYSTOLIC) 2.5 MG tablet  ICD code (if different than what is on RX):  I10, E11.21, Z79.4  Previously Tried and Failed:    Rationale:      Ins[facility]  [facility]  Insurance ID:  [id number]  Key: [id number]      Pharmacy Information (if different than what is on RX)  Name:  [facility]  Phone:  [phone]

## 2020-03-04 DIAGNOSIS — E11.21 TYPE 2 DIABETES MELLITUS WITH DIABETIC NEPHROPATHY, WITH LONG-TERM CURRENT USE OF INSULIN (H): ICD-10-CM

## 2020-03-04 DIAGNOSIS — Z79.4 TYPE 2 DIABETES MELLITUS WITH DIABETIC NEPHROPATHY, WITH LONG-TERM CURRENT USE OF INSULIN (H): ICD-10-CM

## 2020-03-04 NOTE — TELEPHONE ENCOUNTER
"Requested Prescriptions   Pending Prescriptions Disp Refills     insulin pen needle (RELION MINI PEN NEEDLES) 31G X 6 MM miscellaneous 100 each 3     Sig: USE 1 PEN NEEDLE DAILY OR AS DIRECTED       Diabetic Supplies Protocol Passed - 3/4/2020  7:53 AM        Passed - Medication is active on med list        Passed - Patient is 18 years of age or older        Passed - Recent (6 mo) or future (30 days) visit within the authorizing provider's specialty     Patient had office visit in the last 6 months or has a visit in the next 30 days with authorizing provider.  See \"Patient Info\" tab in inbasket, or \"Choose Columns\" in Meds & Orders section of the refill encounter.              "

## 2020-03-05 DIAGNOSIS — M54.41 CHRONIC RIGHT-SIDED LOW BACK PAIN WITH RIGHT-SIDED SCIATICA: ICD-10-CM

## 2020-03-05 DIAGNOSIS — G89.29 CHRONIC RIGHT-SIDED LOW BACK PAIN WITH RIGHT-SIDED SCIATICA: ICD-10-CM

## 2020-03-05 NOTE — TELEPHONE ENCOUNTER
tramadol      Last Written Prescription Date:  1/25/20  Last Fill Quantity: 30,   # refills: 0  Last Office Visit: 1/27/20  Future Office visit:       Routing refill request to provider for review/approval because:  Drug not on the FMG, UMP or  Health refill protocol or controlled substance    RX monitoring program ([place]) reviewed:  reviewed- no concerns    [place]:  [email or web address]

## 2020-03-06 RX ORDER — METOPROLOL SUCCINATE 25 MG/1
25 TABLET, EXTENDED RELEASE ORAL DAILY
Qty: 90 TABLET | Refills: 3 | Status: SHIPPED | OUTPATIENT
Start: 2020-03-06 | End: 2020-03-09 | Stop reason: SINTOL

## 2020-03-06 NOTE — TELEPHONE ENCOUNTER
Spoke with Patient's daughter, informed of Rx change and to  medication-Patient has been taken 2-3 tablets of Tramadol, Patient is now out of Tramadol.

## 2020-03-06 NOTE — TELEPHONE ENCOUNTER
Insurance requesting more information - Patient needs to try/fail two formulary alternatives.

## 2020-03-06 NOTE — TELEPHONE ENCOUNTER
Pt to stop Bystolic since not covered by insurance and instead start Metoprolol XL 25mg tab, 1 tab daily for palpitation hx. Rx sent to [facility] pharmacy in chart. Inform pt and daughter as pt's English limited

## 2020-03-06 NOTE — TELEPHONE ENCOUNTER
Central Prior Authorization Team   Phone: [phone]    PA Initiation    Medication: nebivolol (BYSTOLIC) 2.5 MG tablet  Insurance Company: [employer] - Phone [phone] Fax [phone]  Pharmacy Filling the Rx: [facility] [address] [place] [address]  Filling Pharmacy Phone: [phone]  Filling Pharmacy Fax: [phone]  Start Date: 3/6/2020

## 2020-03-09 ENCOUNTER — TELEPHONE (OUTPATIENT)
Dept: INTERNAL MEDICINE | Facility: CLINIC | Age: 75
End: 2020-03-09

## 2020-03-09 DIAGNOSIS — R00.2 PALPITATIONS: Primary | ICD-10-CM

## 2020-03-09 DIAGNOSIS — I10 ESSENTIAL HYPERTENSION WITH GOAL BLOOD PRESSURE LESS THAN 130/80: ICD-10-CM

## 2020-03-09 RX ORDER — TRAMADOL HYDROCHLORIDE 50 MG/1
50 TABLET ORAL 2 TIMES DAILY
Qty: 60 TABLET | Refills: 1 | Status: SHIPPED | OUTPATIENT
Start: 2020-03-09 | End: 2020-05-02

## 2020-03-09 RX ORDER — BISOPROLOL FUMARATE 5 MG/1
TABLET, FILM COATED ORAL
Qty: 30 TABLET | Refills: 11 | Status: SHIPPED | OUTPATIENT
Start: 2020-03-09 | End: 2020-04-13

## 2020-03-09 NOTE — TELEPHONE ENCOUNTER
Daughter calling,     Patient had side effects from the Metoprolol that was last prescribed. Patient expressed to have extreme dry hacking had been cough and throat irritation. Patient reported to have only taken it for 1 day. Cough is resolved after not taking it.     PCP please advise.     BP Readings from Last 3 Encounters:   01/27/20 132/86   12/26/19 138/80   04/18/18 140/85       Adry PERESN, RN, PHN

## 2020-03-09 NOTE — TELEPHONE ENCOUNTER
Stop Metoprolol and start Bisoprolol 5mg tab, 1/2  Tab (totla 2.5mg)  daily for blood pressure and palpitations. If  SBP often > 140 or has frequent palpitations despite this dose after 1 week, then increase dose to 1 tab daily in AM. Rx faxed to pharmacy.  If side effects with this med too, then pt to let us know and will then resubmit prior auth for Bystolic. Insurance will not cover that drug now since pt has not failed two trials of formulary meds

## 2020-03-09 NOTE — TELEPHONE ENCOUNTER
PCP please advise of refill request.     Daughter wondering if this can be refilled. Patient has run out. Patient had started taking it 2 times a day, she was aware patient was to take once a day but one tablet was not helping.  She had tried using OTC Tylenol extra strength along with Aleve. Alternating, Aleve caused drowsiness so she stopped using. Tylenol extra strength patient is taking 2 tabs TID.     Please advise.      Adry PERESN, RN, PHN

## 2020-03-10 ENCOUNTER — TELEPHONE (OUTPATIENT)
Dept: INTERNAL MEDICINE | Facility: CLINIC | Age: 75
End: 2020-03-10

## 2020-03-10 NOTE — TELEPHONE ENCOUNTER
Prior Authorization Retail Medication Request    Medication/Dose: traMADol (ULTRAM) 50 MG tablet  ICD code (if different than what is on RX):  M54.41, G89.29  Previously Tried and Failed:    Rationale:      Insurance Name:  [employer]  Insurance ID:  [id number]  Key: [id number]      Pharmacy Information (if different than what is on RX)  [facility]  Phone:  [phone]

## 2020-03-10 NOTE — TELEPHONE ENCOUNTER
Rx changed to 1 tab BID for now. Pt NOT to increase use of med in future  More than what is written by MD without appt to first discuss as per instructions of controlled substance agreement. Rx done for 1 month and 1 refill which will last until approc 5/8/20. Pt to see me in late April/eary April before running  out of the medication for follow-up. Assist with scheduling future appt and inform pt/daughter. Rx faxed

## 2020-03-12 NOTE — TELEPHONE ENCOUNTER
Central Prior Authorization Team   Phone: [phone]      PA Initiation    Medication: traMADol (ULTRAM) 50 MG tablet-Initiated  Insurance Company: [employer] - Phone [phone] Fax [phone]  Pharmacy Filling the Rx: [facility] [address]  Filling Pharmacy Phone: [phone]  Filling Pharmacy Fax:    Start Date: 3/12/2020

## 2020-03-12 NOTE — TELEPHONE ENCOUNTER
Prior Authorization Approval    Authorization Effective Date: 2/11/2020  Authorization Expiration Date: 3/12/2021  Medication: traMADol (ULTRAM) 50 MG tablet-APPROVED  Approved Dose/Quantity:   Reference #:     Insurance Company: [employer] - Phone [phone] Fax [phone]  Expected CoPay:       CoPay Card Available:      Foundation Assistance Needed:    Which Pharmacy is filling the prescription (Not needed for infusion/clinic administered): [facility] [address]  Pharmacy Notified: Yes  Patient Notified: No    Pharmacy will notify patient when medication is ready.

## 2020-04-02 DIAGNOSIS — Z79.4 TYPE 2 DIABETES MELLITUS WITH DIABETIC NEPHROPATHY, WITH LONG-TERM CURRENT USE OF INSULIN (H): Primary | ICD-10-CM

## 2020-04-02 DIAGNOSIS — E11.21 TYPE 2 DIABETES MELLITUS WITH DIABETIC NEPHROPATHY, WITH LONG-TERM CURRENT USE OF INSULIN (H): Primary | ICD-10-CM

## 2020-04-13 ENCOUNTER — VIRTUAL VISIT (OUTPATIENT)
Dept: INTERNAL MEDICINE | Facility: CLINIC | Age: 75
End: 2020-04-13
Payer: COMMERCIAL

## 2020-04-13 DIAGNOSIS — Z79.4 TYPE 2 DIABETES MELLITUS WITH DIABETIC NEPHROPATHY, WITH LONG-TERM CURRENT USE OF INSULIN (H): ICD-10-CM

## 2020-04-13 DIAGNOSIS — M54.50 CHRONIC BILATERAL LOW BACK PAIN WITHOUT SCIATICA: ICD-10-CM

## 2020-04-13 DIAGNOSIS — G89.29 CHRONIC BILATERAL LOW BACK PAIN WITHOUT SCIATICA: ICD-10-CM

## 2020-04-13 DIAGNOSIS — I10 ESSENTIAL HYPERTENSION WITH GOAL BLOOD PRESSURE LESS THAN 130/80: ICD-10-CM

## 2020-04-13 DIAGNOSIS — R00.2 PALPITATIONS: ICD-10-CM

## 2020-04-13 DIAGNOSIS — G56.01 CARPAL TUNNEL SYNDROME OF RIGHT WRIST: ICD-10-CM

## 2020-04-13 DIAGNOSIS — E11.21 TYPE 2 DIABETES MELLITUS WITH DIABETIC NEPHROPATHY, WITH LONG-TERM CURRENT USE OF INSULIN (H): ICD-10-CM

## 2020-04-13 PROCEDURE — 99214 OFFICE O/P EST MOD 30 MIN: CPT | Mod: 95 | Performed by: INTERNAL MEDICINE

## 2020-04-13 RX ORDER — GLIPIZIDE 5 MG/1
5 TABLET ORAL
Qty: 180 TABLET | Refills: 1 | Status: SHIPPED | OUTPATIENT
Start: 2020-04-13 | End: 2021-05-21

## 2020-04-13 RX ORDER — BISOPROLOL FUMARATE 5 MG/1
TABLET, FILM COATED ORAL
Qty: 90 TABLET | Refills: 3 | Status: SHIPPED | OUTPATIENT
Start: 2020-04-13 | End: 2021-04-21

## 2020-04-13 NOTE — PATIENT INSTRUCTIONS
Continue current medications including tramadol 1 tab twice a day for chronic pain. Tramadol prescription has been refilled at [facility] pharmacy  Start using a right wrist brace at night to keep wrist from being bent for possible carpal tunnel issues.  If not helping right hand numbness/pain after a couple weeks, then contact clinic  Repeat nonfasting labs in early July.  See me a few days later in clinic for an appopintment for review of results and follow-up regarding chronic pain management  Check blood sugar in the morning before breakfast today. The before dinner/supper the next day.  Then before bedtime the day after that and then restart this 3 day cycle of checking blod sugars.  This way, you will get a sense of what is happening with your blood sugars throughout the day over 3-day time while still only checking blood sugars just daily  Continue stretching exercises in the morning for low back

## 2020-04-13 NOTE — PROGRESS NOTES
"Essie Huddleston is a 75 year old female who is being evaluated via a billable telephone visit.      The patient has been notified of following:     \"This telephone visit will be conducted via a call between you and your physician/provider. We have found that certain health care needs can be provided without the need for a physical exam.  This service lets us provide the care you need with a short phone conversation.  If a prescription is necessary we can send it directly to your pharmacy.  If lab work is needed we can place an order for that and you can then stop by our lab to have the test done at a later time.    Telephone visits are billed at different rates depending on your insurance coverage. During this emergency period, for some insurers they may be billed the same as an in-person visit.  Please reach out to your insurance provider with any questions.    If during the course of the call the physician/provider feels a telephone visit is not appropriate, you will not be charged for this service.\"    Patient has given verbal consent for Telephone visit?  Yes    How would you like to obtain your AVS? Mail a copy    Subjective     Essie Huddleston is a 75 year old female who presents to clinic today for the following health issues:    Chief Complaint   Patient presents with     Numbness     Patient c/o right hand numbness in the morning between 10-15 mins nearly everyday mostly in the AM. Patient states she has started around March.     Due to the current impact of the Covid19 virus and recommendations by [facility], CDC, etc to limit  clinic visits and pt exposure risk, was recommend pt proceed with virtual phone visit to address acute/stable  medical needs with plan to defer other non-acute health maintenance issues/exam to a future date when less self-isolation is required.    I have reviewed the nursing note as documented above.   See below for other information/data  and my personal notes capturing the " substance of my conversation with the patient.       HPI:       AM sugars . Not checking other times of the  day.  Following diabetic diet.   Denies CP, SOB, abdominal pain, polyuria, polydipsia, vision changes  or skin problems. Has some pain (pricking) numbness in the right AM from 4AM to 9 AM and then resolves. Not present  the rest of the day and does not have sx now. Gets better if shaking hand and making fist over and over.  Very rare will have sx in left hand also and not as severe if occurs. No neck pain.  Patient not able to reproduce symptoms by moving her neck.  Chronic stable back pain.  Using tramadol 1 tablet twice a day.  Occasional radiation of pain down the extremities to her knees only.  No radiation to the foot.  On gabapentin and tolerating well.  Has been working on some old physical therapy exercises also. Denies recent palpitations with current use of Bisoprolol    Lab Results   Component Value Date    GLC 92 12/26/2019     Lab Results   Component Value Date    A1C 7.6 12/26/2019     Lab Results   Component Value Date    CHOL 169 12/26/2019     Lab Results   Component Value Date    LDL 74 12/26/2019     Lab Results   Component Value Date    HDL 77 12/26/2019     Lab Results   Component Value Date    TRIG 92 12/26/2019     Lab Results   Component Value Date    CR 0.67 12/26/2019     Lab Results   Component Value Date    ALT 24 12/26/2019     Lab Results   Component Value Date    AST 12 12/26/2019     Lab Results   Component Value Date    MICROL <5 12/26/2019     Lab Results   Component Value Date    TSH 3.14 12/26/2019         Additional ROS:   Constitutional, HEENT, Cardiovascular, Pulmonary, GI and , Neuro, MSK and Psych review of systems/symptoms are otherwise negative or unchanged from previous, except as noted above.      ASSESSMENT:   1. Type 2 diabetes mellitus with diabetic nephropathy, with long-term current use of insulin (H)   Controlled re: AM sugars. Previous A1C a little  above goal. Will have pt  change times of day (see instructions below) when checking blood sugars  while still just doing once a day and repeat fasting labs in early July when Covid19 restrictions re: social distancing/isolation hopefully lessened. (Order already in chart). Continue current medications.     2. Carpal tunnel syndrome of right wrist  Probable carpal tunnel based on description.  Unable to reduce with neck movement to suggest cervical etiology.  Will have patient try using a wrist brace at night when sleeping.  If symptoms persist, will then consider future EMG when coronavirus restrictions lessened for elective procedures  - Wrist/Arm Supplies Order for DME - ONLY FOR DME    3. Chronic bilateral low back pain without sciatica  Stable.  Spoke with pharmacy and patient has 1 more refill left of tramadol. [place]  [place] and appropriate    4. Palpitations  Controlled.  - bisoprolol (ZEBETA) 5 MG tablet; 1/2-1 tab daily  Dispense: 90 tablet; Refill: 3    5. Essential hypertension with goal blood pressure less than 130/80  Most recent blood pressure reading in clinic 1/27/20 minimally above goal.  Patient not have blood pressure monitor at home.   Will continue current medication for now and have blood pressure rechecked in future after patient has labs done in July and see me back in clinic in  - bisoprolol (ZEBETA) 5 MG tablet; 1/2-1 tab daily  Dispense: 90 tablet; Refill: 3    PLAN:  Continue current medications including tramadol 1 tab twice a day for chronic pain. Tramadol prescription has been refilled at [facility] pharmacy (done 4/13/20 after MD confirmed that 1 refill remaining from previous Rx and spoke with pharmacy  Start using a right wrist brace at night to keep wrist from being bent for possible carpal tunnel issues.  If not helping right hand numbness/pain after a couple weeks, then contact clinic  Repeat nonfasting labs in early July.  See me a few days later in clinic for an appopintment for  review of results and follow-up regarding chronic pain management  Check blood sugar in the morning before breakfast today. The before dinner/supper the next day.  Then before bedtime the day after that and then restart this 3 day cycle of checking blod sugars.  This way, you will get a sense of what is happening with your blood sugars throughout the day over 3-day time while still only checking blood sugars just daily  Continue stretching exercises in the morning for low back    Phone call contact time  Call Started at 10:30 AM  Call Ended at 10:46AM  Total minutes: 16 min    (Chart documentation was completed, in part, with [employer] voice-recognition software. Even though reviewed, some grammatical, spelling, and word errors may remain.)    Luigi Driscoll MD  Internal Medicine Department  [facility]

## 2020-04-21 PROBLEM — M54.50 BILATERAL LOW BACK PAIN WITHOUT SCIATICA: Status: RESOLVED | Noted: 2020-02-03 | Resolved: 2020-04-21

## 2020-04-21 NOTE — PROGRESS NOTES
Discharge Note    Progress reporting period is from 2-3-20 to Feb 25, 2020.      Essie failed to follow up and current status is unknown.  Please see information below for last relevant information on current status.  Patient seen for 4 visits.    SUBJECTIVE  At last visit patient reported continued pain left>right LB, intermittent - ranged 0-6/10.  Doing REIL on floor 2x/day and JIE 2x/day.  Improving overall - not taking pain medication as often (2x/day instead of 3).  Did TA exercise 2x this week.    Current pain level is 6/10(left LB).     Previous pain level was   .   Changes in function:  Yes (See Goal flowsheet attached for changes in current functional level)  Adverse reaction to treatment or activity: None    OBJECTIVE  Trunk AROM extension 66%.  Review/corrected HEP, encouraged increased frequency.      ASSESSMENT/PLAN  Diagnosis: low back pain   Patient did not return for further visits so problems/goal status is unknown.  Assessment of Progress: current status is unknown.    Last current status:     Self Management Plans:  HEP      Recommendations:  Patient did not return for follow-up visits as expected.  Will discharge physical therapy chart at this time.      Please refer to the daily flowsheet for treatment today, total treatment time and time spent performing 1:1 timed codes.

## 2020-05-01 DIAGNOSIS — F11.90 CHRONIC, CONTINUOUS USE OF OPIOIDS: Primary | ICD-10-CM

## 2020-05-01 DIAGNOSIS — M54.41 CHRONIC RIGHT-SIDED LOW BACK PAIN WITH RIGHT-SIDED SCIATICA: ICD-10-CM

## 2020-05-01 DIAGNOSIS — G89.29 CHRONIC RIGHT-SIDED LOW BACK PAIN WITH RIGHT-SIDED SCIATICA: ICD-10-CM

## 2020-05-01 NOTE — TELEPHONE ENCOUNTER
tramadol      Last Written Prescription Date:  3/9/20  Last Fill Quantity: 60,   # refills: 1  Last Office Visit: 4/13/20  Future Office visit:       Routing refill request to provider for review/approval because:  Drug not on the FMG, UMP or  Health refill protocol or controlled substance    RX monitoring program ([place]) reviewed:  reviewed- no concerns 3/5/20    [place] profile:  [email or web address]

## 2020-05-02 RX ORDER — TRAMADOL HYDROCHLORIDE 50 MG/1
TABLET ORAL
Qty: 60 TABLET | Refills: 2 | Status: SHIPPED | OUTPATIENT
Start: 2020-05-02 | End: 2020-06-23

## 2020-05-03 NOTE — TELEPHONE ENCOUNTER
UTD re: clinic visit/evisit every 3 months. [place]  site [place] and [place] substance med contract on file. Rx electronically sent to pharmacy. Pharmacy instructed to fill Rx on 5/12/20 as last RF done 4/13/20. Inform pt may PU Rx then on 5/12/20. See me in 3 mos for follow-up

## 2020-05-26 ENCOUNTER — TELEPHONE (OUTPATIENT)
Dept: INTERNAL MEDICINE | Facility: CLINIC | Age: 75
End: 2020-05-26

## 2020-05-26 NOTE — TELEPHONE ENCOUNTER
Reason for Call:  Other note    Detailed comments: Haydee called stating she has lost the note she is supposed to give to the pharmacy for them to show her which brace is needed. Please call when copy is printed and ready to .    Phone Number Patient can be reached at: Cell number on file:    Telephone Information:   Mobile [phone]       Best Time: ASAP    Can we leave a detailed message on this number? YES    Call taken on 5/26/2020 at 12:21 PM by Tejal Rodriguez

## 2020-06-11 ENCOUNTER — HOSPITAL ENCOUNTER (OUTPATIENT)
Dept: MAMMOGRAPHY | Facility: CLINIC | Age: 75
Discharge: HOME | End: 2020-06-11
Attending: INTERNAL MEDICINE | Admitting: INTERNAL MEDICINE
Payer: COMMERCIAL

## 2020-06-11 DIAGNOSIS — Z12.31 ENCOUNTER FOR SCREENING MAMMOGRAM FOR BREAST CANCER: ICD-10-CM

## 2020-06-11 PROCEDURE — 77067 SCR MAMMO BI INCL CAD: CPT

## 2020-06-22 DIAGNOSIS — G89.29 CHRONIC RIGHT-SIDED LOW BACK PAIN WITH RIGHT-SIDED SCIATICA: ICD-10-CM

## 2020-06-22 DIAGNOSIS — F11.90 CHRONIC, CONTINUOUS USE OF OPIOIDS: ICD-10-CM

## 2020-06-22 DIAGNOSIS — M54.41 CHRONIC RIGHT-SIDED LOW BACK PAIN WITH RIGHT-SIDED SCIATICA: ICD-10-CM

## 2020-06-23 RX ORDER — TRAMADOL HYDROCHLORIDE 50 MG/1
50 TABLET ORAL 2 TIMES DAILY
Qty: 60 TABLET | Refills: 2 | OUTPATIENT
Start: 2020-06-23

## 2020-06-23 RX ORDER — TRAMADOL HYDROCHLORIDE 50 MG/1
50 TABLET ORAL 2 TIMES DAILY
Qty: 60 TABLET | Refills: 2 | Status: SHIPPED | OUTPATIENT
Start: 2020-06-23 | End: 2020-09-16

## 2020-06-23 NOTE — TELEPHONE ENCOUNTER
Spoke to pharmacy.   Since pt has been filling RX for 14 pills - 7 day supply at a time, she has reached her maximum allowed refills for this RX. You can only fill a controlled substance for the 1st time with 5 additional refills. (even though the quantity is still there). She has 96 pills remaining, but can't refill this RX anymore, it is VOID.   So pharmacy needs a new RX sent over.   Unsure why she was getting only 14 at a time.   Since PA was approved on 3/12, and she was dispensed full 60# last RX.

## 2020-06-23 NOTE — TELEPHONE ENCOUNTER
Pt called back.    She is not sure why she was getting medications 7 days at a time.  This is not something she had asked for.  Pt states all her other medications are always filled for 3 months.    RN called to [facility] again to clarify why pharmacy is only dispensing 7 days at a time.  Pharmacy clarified that this is likely an insurance concern.  Pharmacy states that there will likely need a PA.    At this time, RN and pharm tech plan for this- provider please consider:    New script to be sent today for 30 days (60 tabs). Pharmacy will run through insurance. If insurance is limiting factor as pharm tech believes it to be, pt will get 7 days of meds, and the other 23 days will be ran through a PA. If this is successful, future refills will be 30 days.    If a new script is run and there is a different reason that this is not being filled for 30, pharm tech plans to call clinic back.

## 2020-06-23 NOTE — TELEPHONE ENCOUNTER
"Ok so this needs a conversation with the patient .  She needs to know that when she was filling once a week that this caused her script to be void after the 5 refills. - PER pharmacy.     Find out what she would like to do for quantity as she will only be able to 'refill\" 5 times.   "

## 2020-06-23 NOTE — TELEPHONE ENCOUNTER
Reviewed last refill encounter.   Refilled on 5/2/2020- note to pharmacy to fill starting 5/12/2020  #60 and two refills on the prescription from 5/2-   Has refills.   Pharmacy needs to look for the last sent refill on 5/2

## 2020-06-23 NOTE — TELEPHONE ENCOUNTER
[facility] pharmacy intern called. Pt is out of Tramadol 50 mg. Pt has been filling only 14 tablets every 6-7 days. They are not sure why - maybe financial reasons.     5/12 - 14   5/18 - 14  5/25 - 14  5/31 - 14  6/8 - 14  6/15 - 14

## 2020-07-02 ENCOUNTER — VIRTUAL VISIT (OUTPATIENT)
Dept: INTERNAL MEDICINE | Facility: CLINIC | Age: 75
End: 2020-07-02
Payer: COMMERCIAL

## 2020-07-02 VITALS
DIASTOLIC BLOOD PRESSURE: 72 MMHG | WEIGHT: 187 LBS | TEMPERATURE: 98.6 F | BODY MASS INDEX: 34.2 KG/M2 | HEART RATE: 60 BPM | SYSTOLIC BLOOD PRESSURE: 127 MMHG

## 2020-07-02 DIAGNOSIS — I10 ESSENTIAL HYPERTENSION WITH GOAL BLOOD PRESSURE LESS THAN 130/80: ICD-10-CM

## 2020-07-02 DIAGNOSIS — E11.21 TYPE 2 DIABETES MELLITUS WITH DIABETIC NEPHROPATHY, WITH LONG-TERM CURRENT USE OF INSULIN (H): Primary | ICD-10-CM

## 2020-07-02 DIAGNOSIS — M25.511 ACUTE PAIN OF RIGHT SHOULDER: ICD-10-CM

## 2020-07-02 DIAGNOSIS — G89.29 CHRONIC RIGHT-SIDED LOW BACK PAIN WITHOUT SCIATICA: ICD-10-CM

## 2020-07-02 DIAGNOSIS — Z79.4 TYPE 2 DIABETES MELLITUS WITH DIABETIC NEPHROPATHY, WITH LONG-TERM CURRENT USE OF INSULIN (H): Primary | ICD-10-CM

## 2020-07-02 DIAGNOSIS — E78.5 HYPERLIPIDEMIA LDL GOAL <100: ICD-10-CM

## 2020-07-02 DIAGNOSIS — M54.50 CHRONIC RIGHT-SIDED LOW BACK PAIN WITHOUT SCIATICA: ICD-10-CM

## 2020-07-02 PROCEDURE — 99214 OFFICE O/P EST MOD 30 MIN: CPT | Mod: 95 | Performed by: INTERNAL MEDICINE

## 2020-07-02 NOTE — PROGRESS NOTES
"Essie Huddleston is a 75 year old female who is being evaluated via a billable video visit.      The patient has been notified of following:     \"This video visit will be conducted via a call between you and your physician/provider. We have found that certain health care needs can be provided without the need for an in-person physical exam.  This service lets us provide the care you need with a video conversation.  If a prescription is necessary we can send it directly to your pharmacy.  If lab work is needed we can place an order for that and you can then stop by our lab to have the test done at a later time.    Video visits are billed at different rates depending on your insurance coverage.  Please reach out to your insurance provider with any questions.    If during the course of the call the physician/provider feels a video visit is not appropriate, you will not be charged for this service.\"    Patient has given verbal consent for Video visit? Yes  How would you like to obtain your AVS? Jay  Patient would like the video invitation sent by: Text to cell phone: [phone]  Will anyone else be joining your video visit? No      Subjective     Essie Huddleston is a 75 year old female who presents today via video visit for the following health issues:    HPI  Diabetes Follow-up    How often are you checking your blood sugar? One time daily  What time of day are you checking your blood sugars (select all that apply)?  Before meals  Have you had any blood sugars above 200?  Yes -when checking at bedtime twice  Have you had any blood sugars below 70?  No    What symptoms do you notice when your blood sugar is low?  None    What concerns do you have today about your diabetes? None     Do you have any of these symptoms? (Select all that apply)  Numbness in feet and Burning in feet    Have you had a diabetic eye exam in the last 12 months? No                Hyperlipidemia Follow-Up      Are you regularly taking any " medication or supplement to lower your cholesterol?   Yes- Simvastatin    Are you having muscle aches or other side effects that you think could be caused by your cholesterol lowering medication?  No    Hypertension Follow-up      Do you check your blood pressure regularly outside of the clinic? Yes     Are you following a low salt diet? Yes    Are your blood pressures ever more than 140 on the top number (systolic) OR more   than 90 on the bottom number (diastolic), for example 140/90? Yes-Before Taking Medication    BP Readings from Last 2 Encounters:   01/27/20 132/86   12/26/19 138/80     Hemoglobin A1C (%)   Date Value   12/26/2019 7.6 (H)   08/05/2016 9.0 (H)     LDL Cholesterol Calculated (mg/dL)   Date Value   12/26/2019 74   08/05/2016 89         How many servings of fruits and vegetables do you eat daily?  2-3    On average, how many sweetened beverages do you drink each day (Examples: soda, juice, sweet tea, etc.  Do NOT count diet or artificially sweetened beverages)?   0    How many days per week do you exercise enough to make your heart beat faster? 7    How many minutes a day do you exercise enough to make your heart beat faster? 30 - 60-2miles or more walking     How many days per week do you miss taking your medication? 0        Video Start Time: 11:41am    Pt's past medical history, family history, habits, medications and allergies were reviewed with the patient today.  Most recent lab results reviewed with pt. Problem list and histories reviewed & adjusted, as indicated.  Additional history as below:    HPI:    Blood sugar 88 this AM   Occ in PM and was  143  Denies CP, SOB, abdominal pain, polyuria, polydipsia, vision changes, extremity numbness/parasthesias or skin problems.  Stable chronic back pain with use of tramadol 1 tablet twice a day.  Has some right shoulder pain occasionally with abduction.  No trauma.  Not affecting sleep.  Not to the point that patient wishes to have orthopedic  evaluation or physical therapy.  Patient walking daily for exercise.  Due for follow-up diabetic labs      Lab Results   Component Value Date    GLC 92 12/26/2019     Lab Results   Component Value Date    A1C 7.6 12/26/2019     Lab Results   Component Value Date    CHOL 169 12/26/2019     Lab Results   Component Value Date    LDL 74 12/26/2019     Lab Results   Component Value Date    HDL 77 12/26/2019     Lab Results   Component Value Date    TRIG 92 12/26/2019     Lab Results   Component Value Date    CR 0.67 12/26/2019     Lab Results   Component Value Date    ALT 24 12/26/2019     Lab Results   Component Value Date    AST 12 12/26/2019     Lab Results   Component Value Date    MICROL <5 12/26/2019     Lab Results   Component Value Date    TSH 3.14 12/26/2019          Additional ROS:   Constitutional, HEENT, Cardiovascular, Pulmonary, GI and , Neuro, MSK and Psych review of systems/symptoms are otherwise negative or unchanged from previous, except as noted above.           Objective    /72   Pulse 60   Temp 98.6  F (37  C) (Tympanic)   Wt 84.8 kg (187 lb)   BMI 34.20 kg/m        Physical Exam   GENERAL:   alert and no distress  EYES: Eyes grossly normal to inspection, conjunctivae and sclerae normal  RESP: no audible wheeze, cough, or visible cyanosis.  No visible retractions or increased work of breathing.  Able to speak fully in complete sentences.  NEURO: Cranial nerves grossly intact, mentation intact and speech normal  PSYCH: mentation appears normal, affect normal/bright, judgement and insight intact, normal speech and appearance well-groomed   MSK: Minimal tenderness to abduction right shoulder beyond 90 degrees.  Patient able to internal and externally rotate shoulder without much discomfort     ASSESSMENT:   1. Type 2 diabetes mellitus with diabetic nephropathy, with long-term current use of insulin (H)  Recent blood sugars under control.  Last A1c from 7 months ago above goal 7.5.  Patient  to have repeat nonfasting A1c in the next 1 to 2 weeks as previously ordered.  Repeat other diabetic labs in December  - Comprehensive metabolic panel; Future  - Lipid panel reflex to direct LDL Fasting; Future  - Albumin Random Urine Quantitative with Creat Ratio; Future    2. Chronic right-sided low back pain without sciatica  Stable with tramadol 1 tab twice a day.. [place]  UTD    3. Acute pain of right shoulder  Patient declined physical therapy or orthopedic evaluation.  No trauma.  Patient given stretching exercises to perform in the morning for the shoulder with range of motion.  If symptoms not improving, patient will inform physician and will refer to Ortho for imaging, possible injection vs PT    4. Essential hypertension with goal blood pressure less than 130/80  Controlled    5. Hyperlipidemia LDL goal <100  Controlled statin.  Repeat labs December 2020  - Comprehensive metabolic panel; Future  - Lipid panel reflex to direct LDL Fasting; Future      PLAN:  Continue current meds  Call  [phone]  to schedule a future lab appointment  non-fasting in 1 week for diabetes  Repeat fasting labs in December 2020  Schedule a follow up appointment with me in clinic a few days after these future labs are drawn to review results and follow-up on pain control management with use of tramadol  I would recommend you receive an influenza (flu) vaccine  this Fall (September or October)  Range of motion stretching exercises for right shoulder.  If shoulder symptoms persist, then contact clinic and will refer to orthopedics          Video-Visit Details    Type of service:  Video Visit    Video End Time:11:57 am    Originating Location (pt. Location): Home    Distant Location (provider location):  [facility]     Platform used for Video Visit: Mary Driscoll MD  Internal Medicine Department  [facility], [place]        (Chart documentation was completed, in part, with [employer]  voice-recognition software. Even though reviewed, some grammatical, spelling, and word errors may remain.)

## 2020-07-17 NOTE — PATIENT INSTRUCTIONS
Continue current meds  Call  [phone]  to schedule a future lab appointment  non-fasting in 1 week for diabetes  Repeat fasting labs in December 2020  Schedule a follow up appointment with me in clinic a few days after these future labs are drawn to review results and follow-up on pain control management with use of tramadol  I would recommend you receive an influenza (flu) vaccine  this Fall (September or October)  Range of motion stretching exercises for right shoulder.  If shoulder symptoms persist, then contact clinic and will refer to orthopedics

## 2020-07-23 ENCOUNTER — TELEPHONE (OUTPATIENT)
Dept: INTERNAL MEDICINE | Facility: CLINIC | Age: 75
End: 2020-07-23

## 2020-07-23 DIAGNOSIS — E78.5 HYPERLIPIDEMIA LDL GOAL <100: ICD-10-CM

## 2020-07-23 DIAGNOSIS — Z79.4 TYPE 2 DIABETES MELLITUS WITH DIABETIC NEPHROPATHY, WITH LONG-TERM CURRENT USE OF INSULIN (H): ICD-10-CM

## 2020-07-23 DIAGNOSIS — E11.21 TYPE 2 DIABETES MELLITUS WITH DIABETIC NEPHROPATHY, WITH LONG-TERM CURRENT USE OF INSULIN (H): ICD-10-CM

## 2020-07-23 LAB
ALBUMIN SERPL-MCNC: 3.8 G/DL (ref 3.4–5)
ALP SERPL-CCNC: 63 U/L (ref 40–150)
ALT SERPL W P-5'-P-CCNC: 19 U/L (ref 0–50)
ANION GAP SERPL CALCULATED.3IONS-SCNC: 5 MMOL/L (ref 3–14)
AST SERPL W P-5'-P-CCNC: 10 U/L (ref 0–45)
BILIRUB SERPL-MCNC: 0.3 MG/DL (ref 0.2–1.3)
BUN SERPL-MCNC: 9 MG/DL (ref 7–30)
CALCIUM SERPL-MCNC: 9 MG/DL (ref 8.5–10.1)
CHLORIDE SERPL-SCNC: 107 MMOL/L (ref 94–109)
CHOLEST SERPL-MCNC: 127 MG/DL
CO2 SERPL-SCNC: 28 MMOL/L (ref 20–32)
CREAT SERPL-MCNC: 0.63 MG/DL (ref 0.52–1.04)
CREAT UR-MCNC: 48 MG/DL
GFR SERPL CREATININE-BSD FRML MDRD: 88 ML/MIN/{1.73_M2}
GLUCOSE SERPL-MCNC: 87 MG/DL (ref 70–99)
HDLC SERPL-MCNC: 68 MG/DL
LDLC SERPL CALC-MCNC: 31 MG/DL
MICROALBUMIN UR-MCNC: 5 MG/L
MICROALBUMIN/CREAT UR: 10.9 MG/G CR (ref 0–25)
NONHDLC SERPL-MCNC: 59 MG/DL
POTASSIUM SERPL-SCNC: 4.4 MMOL/L (ref 3.4–5.3)
PROT SERPL-MCNC: 7.3 G/DL (ref 6.8–8.8)
SODIUM SERPL-SCNC: 140 MMOL/L (ref 133–144)
TRIGL SERPL-MCNC: 141 MG/DL

## 2020-07-23 PROCEDURE — 36415 COLL VENOUS BLD VENIPUNCTURE: CPT | Performed by: INTERNAL MEDICINE

## 2020-07-23 PROCEDURE — 80061 LIPID PANEL: CPT | Performed by: INTERNAL MEDICINE

## 2020-07-23 PROCEDURE — 80053 COMPREHEN METABOLIC PANEL: CPT | Performed by: INTERNAL MEDICINE

## 2020-07-23 PROCEDURE — 82043 UR ALBUMIN QUANTITATIVE: CPT | Performed by: INTERNAL MEDICINE

## 2020-07-23 NOTE — TELEPHONE ENCOUNTER
Spoke with patient RE: msg below from Dr. Driscoll. Patient would like to return call to schedule at a later time.    Patient due for nonfasting diabetic A1c lab.  Contact patient and assist in scheduling lab appointment.  Will send letter with results     Sallie Rodriguez CMA

## 2020-07-29 DIAGNOSIS — E78.5 HYPERLIPIDEMIA LDL GOAL <100: Primary | ICD-10-CM

## 2020-07-29 DIAGNOSIS — E11.21 TYPE 2 DIABETES MELLITUS WITH DIABETIC NEPHROPATHY, WITH LONG-TERM CURRENT USE OF INSULIN (H): ICD-10-CM

## 2020-07-29 DIAGNOSIS — Z79.4 TYPE 2 DIABETES MELLITUS WITH DIABETIC NEPHROPATHY, WITH LONG-TERM CURRENT USE OF INSULIN (H): ICD-10-CM

## 2020-07-29 LAB — HBA1C MFR BLD: 6.8 % (ref 0–5.6)

## 2020-07-29 PROCEDURE — 83036 HEMOGLOBIN GLYCOSYLATED A1C: CPT | Performed by: INTERNAL MEDICINE

## 2020-07-29 PROCEDURE — 36415 COLL VENOUS BLD VENIPUNCTURE: CPT | Performed by: INTERNAL MEDICINE

## 2020-07-29 NOTE — LETTER
[facility]  [address] [address]  [phone]      8/1/2020       Essie Huddleston  [address]  [facility] [address]        Dear Essie,  Here are your most recent lab results. Unless commented on below, mild variation of results  outside the normal range are not clinically signicant.       Component      Latest Ref Rng & Units 7/23/2020 7/29/2020   Sodium      133 - 144 mmol/L 140    Potassium      3.4 - 5.3 mmol/L 4.4    Chloride      94 - 109 mmol/L 107    Carbon Dioxide      20 - 32 mmol/L 28    Anion Gap      3 - 14 mmol/L 5    Glucose      70 - 99 mg/dL 87    Urea Nitrogen      7 - 30 mg/dL 9    Creatinine      0.52 - 1.04 mg/dL 0.63    GFR Estimate      >60 mL/min/1.73:m2 88    GFR Estimate If Black      >60 mL/min/1.73:m2 >90    Calcium      8.5 - 10.1 mg/dL 9.0    Bilirubin Total      0.2 - 1.3 mg/dL 0.3    Albumin      3.4 - 5.0 g/dL 3.8    Protein Total      6.8 - 8.8 g/dL 7.3    Alkaline Phosphatase      40 - 150 U/L 63    ALT      0 - 50 U/L 19    AST      0 - 45 U/L 10    Cholesterol      <200 mg/dL 127    Triglycerides      <150 mg/dL 141    HDL Cholesterol      >49 mg/dL 68    LDL Cholesterol Calculated      <100 mg/dL 31    Non HDL Cholesterol      <130 mg/dL 59    Creatinine Urine      mg/dL 48    Albumin Urine mg/L      mg/L 5    Albumin Urine mg/g Cr      0 - 25 mg/g Cr 10.90    Hemoglobin A1C      0 - 5.6 %  6.8 (H)     Total Cholesterol, HDL (good) Cholesterol, LDL (bad) Cholesterol, Non-HDL (bad) cholesterol, Triglycerides, Electrolyte, Glucose/Sugar, Kidney function, Liver and Urine Protein lab results were normal.  A1C lab result (which assesses your average blood sugar control for the past 3 months) was improved and overall showed good control of your diabetes.  Continue current medication.  Please contact your pharmacy if you need further refills of your medication.  Call our appointment desk at [phone] or use [employer]on[employer] to schedule  "a \"lab only\" to have your A1C and Basic Metabolic Panel checked non-fasting in 6 months.  Please schedule an appointment with me  a few days after the lab test is drawn to discuss results and follow-up on your medical issues or earlier as needed.    If you have further questions/concerns regarding the results, I would ask that you bring them to your next follow-up appointment with me and I would be happy to review them with you further.      Sincerely,      Luigi Driscoll MD  Internal Medicine      "

## 2020-08-26 ENCOUNTER — OFFICE VISIT (OUTPATIENT)
Dept: OPTOMETRY | Facility: CLINIC | Age: 75
End: 2020-08-26
Payer: COMMERCIAL

## 2020-08-26 DIAGNOSIS — H52.03 HYPEROPIA OF BOTH EYES WITH ASTIGMATISM: ICD-10-CM

## 2020-08-26 DIAGNOSIS — H52.4 PRESBYOPIA: ICD-10-CM

## 2020-08-26 DIAGNOSIS — H52.203 HYPEROPIA OF BOTH EYES WITH ASTIGMATISM: ICD-10-CM

## 2020-08-26 DIAGNOSIS — E11.3293: Primary | ICD-10-CM

## 2020-08-26 PROCEDURE — 92004 COMPRE OPH EXAM NEW PT 1/>: CPT | Performed by: OPTOMETRIST

## 2020-08-26 PROCEDURE — 92015 DETERMINE REFRACTIVE STATE: CPT | Performed by: OPTOMETRIST

## 2020-08-26 ASSESSMENT — CUP TO DISC RATIO
OS_RATIO: 0.5
OD_RATIO: 0.5

## 2020-08-26 ASSESSMENT — CONF VISUAL FIELD
OD_NORMAL: 1
OS_NORMAL: 1
METHOD: COUNTING FINGERS

## 2020-08-26 ASSESSMENT — VISUAL ACUITY
OD_SC: 20/30
OD_SC+: -2
CORRECTION_TYPE: GLASSES
OS_SC+: -1
OS_CC: 20/40
METHOD: SNELLEN - LINEAR
OS_SC: 20/20
OD_CC: 20/60

## 2020-08-26 ASSESSMENT — REFRACTION_MANIFEST
OS_AXIS: 178
OD_CYLINDER: +0.75
OD_AXIS: 167
OD_SPHERE: +0.50
OD_SPHERE: +1.00
OS_CYLINDER: +1.50
OS_CYLINDER: +1.50
OS_SPHERE: -0.25
OD_CYLINDER: +1.00
OS_AXIS: 155
OD_ADD: +2.50
METHOD_AUTOREFRACTION: 1
OS_ADD: +2.50
OS_SPHERE: -0.25
OD_AXIS: 002

## 2020-08-26 ASSESSMENT — TONOMETRY
OS_IOP_MMHG: 15
IOP_METHOD: APPLANATION
OD_IOP_MMHG: 14

## 2020-08-26 ASSESSMENT — SLIT LAMP EXAM - LIDS
COMMENTS: NORMAL
COMMENTS: NORMAL

## 2020-08-26 ASSESSMENT — EXTERNAL EXAM - RIGHT EYE: OD_EXAM: NORMAL

## 2020-08-26 ASSESSMENT — REFRACTION_WEARINGRX
OD_SPHERE: +3.00
SPECS_TYPE: READERS
OS_SPHERE: +3.00

## 2020-08-26 ASSESSMENT — EXTERNAL EXAM - LEFT EYE: OS_EXAM: NORMAL

## 2020-08-26 NOTE — PATIENT INSTRUCTIONS
Patient Education   Diabetes weakens the blood vessels all over the body, including the eyes. Damage to the blood vessels in the eyes can cause swelling or bleeding into part of the eye (called the retina). This is called diabetic retinopathy (KETAN-tin--pu-thee). If not treated, this disease can cause vision loss or blindness.   Symptoms may include blurred or distorted vision, but many people have no symptoms. It's important to see your eye doctor regularly to check for problems.   Early treatment and good control can help protect your vision. Here are the things you can do to help prevent vision loss:      1. Keep your blood sugar levels under tight control.      2. Bring high blood pressure under control.      3. No smoking.      4. Have yearly dilated eye exams.     DRY EYE TREATMENT    I recommend using artificial tears for your dry eye. There are over the counter drops that work well and may be used up to 4x daily. ( systane balance, blink, refresh optive, soothe xp)   If you need more than 4 drops daily, use a preservative free product which come in individual vials and may be used for 24 hours and discarded.   The more severe the dry eye, the more frequent instillation of artificial tears  that are needed to reduce irritation/ inflammation. (Sometimes every 1-2 hours for a couple of days).  You can also add a lubricating ointment in the lower lid at bedtime. ( over the counter refresh pm, genteal gel, lacrilube etc.)    Artificial tears work best as a preventative and not as well after your eyes are starting to bother you and/ or are red.  It may take 4- 6 weeks of using the drops before you notice improvement.  If after that time you are still having problems schedule an appointment for an evaluation to discuss different treatments.    Dry eyes are a chronic condition and you may have more symptoms at certain times of the year.      Additional recommended treatment:  Warm compresses once to twice daily  for 5-10 minutes    Directions for warm soaks  There are few methods for hot compresses. Moisten a washcloth with hot water, or microwave for 10 seconds, being careful to not get the cloth too hot.   Then put the washcloth onto your eyelids for 5 minutes. It will cool quickly so a rice pack or eyemask that can be heated and laid on top of the washcloth will help retain the heat.          Omega 3 fatty acid supplements taken 1-2x daily  Recommend  at least  2000mg omega 3  800 EPA  600 DHA    Blink regularly  Stay hydrated  Increase humidity  Wear sunglasses   Avoid direct exposure to forced air--turn air vents in the car away and keep fans from blowing directly on your face      Start of cataracts in both eyes

## 2020-08-26 NOTE — PROGRESS NOTES
Chief Complaint   Patient presents with     Diabetic Eye Exam   LESLI: 2014  Has OTC readers, nothing for DVA her current pair is broken  Itching, FB rafael feeling on and off.  PRN AT's, right now not using anything.  Accompanied by daughter, helping to interpret  Lab Results   Component Value Date    A1C 6.8 07/29/2020    A1C 7.6 12/26/2019    A1C 9.0 08/05/2016    A1C 9.2 03/09/2015    A1C 7.1 12/01/2014       Last Eye Exam: 2014  Dilated Previously: Yes    What are you currently using to see?  readers    Distance Vision Acuity: Satisfied with vision    Near Vision Acuity: Not satisfied     Eye Comfort: dry and itchy  Do you use eye drops? : Yes: AT's when she has them  Occupation or Hobbies: retired    Yomaira Brand CPO     Medical, surgical and family histories reviewed and updated 8/26/2020.       OBJECTIVE: See Ophthalmology exam    ASSESSMENT:    ICD-10-CM    1. 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 Presbyopia  H52.4 REFRACTION     EYE EXAM (SIMPLE-NONBILLABLE)   3. Hyperopia of both eyes with astigmatism  H52.03 REFRACTION    H52.203 EYE EXAM (SIMPLE-NONBILLABLE)        PLAN:  Discussed spec options     Essie stroud  eye exam results will be sent to Luigi Driscoll OD

## 2020-08-26 NOTE — LETTER
8/26/2020         RE: Essie Huddleston  [address]  [facility] [address]        Dear Colleague,    Thank you for referring your patient, Essie Huddleston, to the [facility]. Please see a copy of my visit note below.    Chief Complaint   Patient presents with     Diabetic Eye Exam   LESLI: 2014  Has OTC readers, nothing for DVA her current pair is broken  Itching, FB rafael feeling on and off.  PRN AT's, right now not using anything.  Accompanied by daughter, helping to interpret  Lab Results   Component Value Date    A1C 6.8 07/29/2020    A1C 7.6 12/26/2019    A1C 9.0 08/05/2016    A1C 9.2 03/09/2015    A1C 7.1 12/01/2014       Last Eye Exam: 2014  Dilated Previously: Yes    What are you currently using to see?  readers    Distance Vision Acuity: Satisfied with vision    Near Vision Acuity: Not satisfied     Eye Comfort: dry and itchy  Do you use eye drops? : Yes: AT's when she has them  Occupation or Hobbies: retired    Yomaira Brand CPO     Medical, surgical and family histories reviewed and updated 8/26/2020.       OBJECTIVE: See Ophthalmology exam    ASSESSMENT:    ICD-10-CM    1. 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 Presbyopia  H52.4 REFRACTION     EYE EXAM (SIMPLE-NONBILLABLE)   3. Hyperopia of both eyes with astigmatism  H52.03 REFRACTION    H52.203 EYE EXAM (SIMPLE-NONBILLABLE)        PLAN:  Discussed spec options     Essie Huddleston aware  eye exam results will be sent to Luigi Driscoll OD     Again, thank you for allowing me to participate in the care of your patient.        Sincerely,        Karen Dent, OD

## 2020-09-08 ENCOUNTER — OFFICE VISIT (OUTPATIENT)
Dept: PODIATRY | Facility: CLINIC | Age: 75
End: 2020-09-08
Payer: COMMERCIAL

## 2020-09-08 VITALS
DIASTOLIC BLOOD PRESSURE: 76 MMHG | WEIGHT: 180 LBS | SYSTOLIC BLOOD PRESSURE: 130 MMHG | BODY MASS INDEX: 33.13 KG/M2 | HEIGHT: 62 IN

## 2020-09-08 DIAGNOSIS — E11.42 TYPE 2 DIABETES MELLITUS WITH PERIPHERAL NEUROPATHY (H): Primary | ICD-10-CM

## 2020-09-08 DIAGNOSIS — B35.1 ONYCHOMYCOSIS: ICD-10-CM

## 2020-09-08 PROCEDURE — 99204 OFFICE O/P NEW MOD 45 MIN: CPT | Performed by: PODIATRIST

## 2020-09-08 ASSESSMENT — MIFFLIN-ST. JEOR: SCORE: 1269.72

## 2020-09-08 NOTE — PATIENT INSTRUCTIONS
"Thank you for choosing [facility] Podiatry / Foot & Ankle Surgery!    [facility] SCHEDULE SURGERY: [phone]   [address] BILLING QUESTIONS: [phone]   [place] [address] AFTER HOURS: [phone]   PH: [phone] CONSUMER [employer]OCANEG[employer] LINE:[phone]   FAX: [phone] APPOINTMENTS: [phone]     Topical lidocaine gel      ROUTINE FOOT CARE (NAIL TRIMMING / CALLUSES)    Go to [email or web address].org ([employer] Association) and search for providers near you.  Otherwise, this is a list we have gathered of  recommended locations/providers in [place].    [facility]   [phone]   [facility]  [phone]  [email or web address]   [facility]  [phone]  [place] + 15 mile radius [facility]  [phone]  [facility].   Ijeoma Mccauley, DPM  [address] [place] [address]  [phone] Joseph Mata, DPM  [address] [place] [address] [phone]   [facility]  [phone] [address] [place] [address]  [facility]  Dr. Germán Sofia  [phone]  [place], [place]   [facility]  [phone]  [place] & [place] Locations  (does not take BCBS) FYI:  *Some providers accept insurance while others are out of pocket. Please contact them for details*     DIABETES AND YOUR FEET  Diabetes can result in several problems in the feet including ulcers (open sores) and amputations. Two of the most important reasons why people develop foot problems when they have diabetes is : 1. Neuropathy (loss of feeling)  2. Vascular disease (loss or decrease of blood flow).    Neuropathy is a term used to describe a loss of nerve function.  Patients with diabetes are at risk of developing neuropathy if their sugars continue to run high and are above the normal value. One theory for neuropathy is that the \"extra\" sugar in the body enters the nerves and is broken down. These by-products build up in the nerve causing it " to swell and impairing nerve function. Often times, this can be prevented by controlling your sugars, dieting and exercise.    When a person develops neuropathy, they usually begin to feel numbness or tingling in their feet and sometime in their legs.  Other symptoms may include painful burning or hot feet, tingling or feeling like insects or ants are crawling on your feet or legs.  If the diabetes is sever and the sugars run high for long periods of time, neuropathy can also occur in the hands.    Vascular disease  is a term used to describe a loss or decrease in circulation (blood flow). There is a problem in getting blood and oxygen to areas that need it. Similar to neuropathy, sugars can build up in the walls of the arteries (blood vessels) and cause them to become swollen, thickened and hardened. This decreases the amount of blood that can go to an area that needs it. Though this is common in the legs of diabetic patients, it can also affect other arteries (blood vessels) in the body such as in the heart and eyes.    In the legs, vascular disease usually results in cramping. Patients who develop leg cramps after walking the same distance every time (i.e. One block, half a mile, ect.) need to let their doctors know so that their circulation may be checked. Cramps causing severe pain in the feet and/or legs while sleeping and the cramps go away when you stand or hang your legs off the side of the bed, may also be a sign of poor blood circulation.  Occasional cramping in cold weather or on rare occasions with activity may not be due to poor circulation, but you should inform your doctor.    PREVENTION OF THESE DISEASES  The key to prevention is good blood sugar control. Poor blood sugar control is a big reason many of these problems start. Physical activity (exercise) is a very good way to help decrease your blood sugars. Exercise can lower your blood sugar, blood pressure, and cholesterol. It also reduces your  risk for heart disease and stroke, relieves stress, and strengthens your heart, muscles and bones.  In addition, regular activity helps insulin work better, improves your blood circulation, and keeps your joints flexible. If you're trying to lose weight, a combination of exercise and wise food choices can help you reach your target weight and maintain it.      PAIN MANAGEMENT  1.Blood Sugar Control - Most important  2. Medications such as:  Amytriptylline, duloxetine, gabapentin, lyrica, tramadol  3. Nutritional therapy:  Vitamin B6 (100mg daily), Vitamin B12 (75mcg daily), Vitamin D 2000 IU daily), Alpha-Lipoic Acid (600-1800mg daily), Acetyl-L-Carnitine (500-1000mg TID, L-methyl folate (1500mcg daily)    ** Metformin can block Vitamin B6 and B12 so it is important to supplement**    FOOT CARE RECOMMENDATIONS   1. Wash your feet with lukewarm water and a mild soap and then dry them thoroughly, especially between the toes.     2. Examine your feet daily looking for cuts, corns, blisters, cracks, ect, especially after wearing new shoes. Make sure to look between your toes. If you cannot see the bottom of your feet, set a mirror on the floor and hold your foot over it, or ask a spouse, friend or family member to examine your feet for you. Contact your doctor immediately if new problems are noted or if sores are not healing.     3. Immediately apply moisturizer to the tops and bottoms of your feet, avoiding areas between the toes. Hand lotion (Intesive Care, Mora, Eucerin, Neutrogena, Curel, ect) is sufficient unless your doctor prescribes a medicated lotion. Apply sunscreen to your feet when going swimming outside.     4. Use clean comfortable shoes, wear white socks (if you have any bleeding or drainage, you will see it on white socks). Socks should not have thick seams or cut off the circulation around the leg. Break in new shoes slowly and rotate with older shoes until broken in. Check the inside of your shoes  with your hand to look for areas of irritation or objects that may have fallen into your shoes.       5. Keep slippers by the side of your bed for use during the night.     6.  Shoes should be fitted by a professional and should not cause areas of irritation.  Check your feet regularly when wearing a new pair of shoes and replace them as needed.     7.  Talk to your doctor about proper exercise. Exercise and stretching stimulate blood flow to your feet and maintain proper glucose levels.     8.  Monitor your blood glucose level as instructed by your doctor. Notify your doctor immediately if your blood sugar is abnormally high or low.    9. Cut your nails straight across, but then gently round any sharp edges with a cardboard nail file. If you have neuropathy, peripheral vascular disease or cannot see that well to trim your own toenails contact [facility] ([phone]) or [facility] ([phone]).      THINGS TO AVOID DOING   1.  Do not soak your feet if you have an open sore. Use only lukewarm water and always check the temperature with your hand as hot water can easily burn your feet.       2.  Never use a hot water bottle or heating pad on your feet. Also do not apply cold compresses to your feet. With decreased sensation, you could burn or freeze your feet.       3.  Do not apply any of these to your feet:    -  Over the counter medicine for corns or warts    -  Harsh chemicals like boric acid    -  Do not self-treat corns, cuts, blisters or infections. Always consult your doctor.       4.  Do not wear sandals, slippers or walk barefoot, especially on hot sand or concrete or other harsh surfaces.     5.  If you smoke, stop!!!

## 2020-09-08 NOTE — PROGRESS NOTES
"Foot & Ankle Surgery  September 8, 2020    CC: \"discolored toes + burning/numbness in her feet\"    I was asked to see Essie Huddleston regarding the chief complaint by:  self    HPI:  Pt is a 75 year old female who presents with above complaint.  Nails are long, discolored, thickened, difficult to cut.  Diabetic, most recent A1c 7/29/20 6.8, but she has had at least 3 9.0+ readings within the past 6 years.  \"get some advice on foot care e.t.c\".  \"burning % tingling\" in feet.      ROS:   Pos for CC.  The patient denies current nausea, vomiting, chills, fevers, belly pain, calf pain, chest pain or SOB.  Complete remainder of ROS is otherwise neg.    VITALS:    Vitals:    09/08/20 1422   BP: 130/76   Weight: 81.6 kg (180 lb)   Height: 1.575 m (5' 2\")       PMH:    Past Medical History:   Diagnosis Date     Chronic right-sided low back pain with right-sided sciatica 12/29/2019     Essential hypertension with goal blood pressure less than 130/80 12/29/2019     Hyperlipidemia LDL goal <100 6/20/2012     Type 2 diabetes mellitus with diabetic nephropathy  (goal A1C<7) 10/24/2015     Vitamin D deficiency 6/20/2012       SXHX: no surgeries per patient     MEDS:    Current Outpatient Medications   Medication     amLODIPine (NORVASC) 5 MG tablet     aspirin 81 MG tablet     bisoprolol (ZEBETA) 5 MG tablet     blood glucose (NO BRAND SPECIFIED) test strip     blood glucose (NO BRAND SPECIFIED) test strip     blood glucose calibration (NO BRAND SPECIFIED) solution     blood glucose monitoring (NO BRAND SPECIFIED) meter device kit     blood glucose monitoring (NO BRAND SPECIFIED) meter device kit     gabapentin (NEURONTIN) 300 MG capsule     glipiZIDE (GLUCOTROL) 5 MG tablet     insulin detemir (LEVEMIR FLEXTOUCH) 100 UNIT/ML pen     insulin pen needle (RELION MINI PEN NEEDLES) 31G X 6 MM miscellaneous     losartan (COZAAR) 100 MG tablet     metFORMIN (GLUCOPHAGE) 1000 MG tablet     simvastatin (ZOCOR) 20 MG tablet     thin (NO " BRAND SPECIFIED) lancets     traMADol (ULTRAM) 50 MG tablet     No current facility-administered medications for this visit.        ALL:     Allergies   Allergen Reactions     Metoprolol      cough       FMH:    Family History   Problem Relation Age of Onset     Diabetes Mother      Cancer Father         prostate     Asthma Son        SocHx:    Social History     Socioeconomic History     Marital status:      Spouse name: Not on file     Number of children: Not on file     Years of education: Not on file     Highest education level: Not on file   Occupational History     Not on file   Social Needs     Financial resource strain: Not on file     Food insecurity     Worry: Not on file     Inability: Not on file     Transportation needs     Medical: Not on file     Non-medical: Not on file   Tobacco Use     Smoking status: Never Smoker     Smokeless tobacco: Never Used   Substance and Sexual Activity     Alcohol use: Yes     Comment: seldom 1 glass wine.     Drug use: No     Sexual activity: Never   Lifestyle     Physical activity     Days per week: Not on file     Minutes per session: Not on file     Stress: Not on file   Relationships     Social connections     Talks on phone: Not on file     Gets together: Not on file     Attends Caodaism service: Not on file     Active member of club or organization: Not on file     Attends meetings of clubs or organizations: Not on file     Relationship status: Not on file     Intimate partner violence     Fear of current or ex partner: Not on file     Emotionally abused: Not on file     Physically abused: Not on file     Forced sexual activity: Not on file   Other Topics Concern     Parent/sibling w/ CABG, MI or angioplasty before 65F 55M? Not Asked   Social History Narrative     Not on file           EXAMINATION:  Gen:   No apparent distress  Neuro:   A&Ox3, no deficits  Psych:    Answering questions appropriately for age and situation with normal affect  Head:     NCAT  Eye:    Visual scanning without deficit  Ear:    Response to auditory stimuli wnl  Lung:    Non-labored breathing on RA noted  Abd:    NTND per patient report  Lymph:    Neg for pitting/non-pitting edema BLE  Vasc:    Pulses palpable, CFT minimally delayed  Neuro:    Light touch sensation intact to all sensory nerve distributions with paresthesias  Derm:    Nails are discolored, thickened, mycotic.    MSK:    ROM, strength wnl without limitation, no pain on palpation noted.  Calf:    Neg for redness, swelling or tenderness    Assessment:  75 year old female with DMII with peripheral neuropathy; onychomycosis/dystrophic nails bilateral       Plan:  Discussed etiologies, anatomy and options  1.  DMII with peripheral neuropathy  -Regarding the patient's diabetes, we dispensed and discussed proper diabetic foot care.  This includes closely monitoring their blood sugars, closely monitoring their blood pressures, and evaluating their feet at least once per day.  We also discussed potential problems associated with barefoot/sock ambulation and soaking their feet.    -recommend topical lidocaine as initial step for neuropathy symptoms. OTC med handout dispensed    2.  Onychomycosis/dystrophic nails  -nails debrided in length/thickness, no charge  -discussed treatment for onychomycosis vs routine nail care.  She would simply like assistance with nail care  -nail care handout dispensed     Follow up:  prn or sooner with acute issues      Patient's medical history was reviewed today    Body mass index is 32.92 kg/m .  Weight management plan: Patient was referred to their PCP to discuss a diet and exercise plan.        José Luis Teresa DPM FACFAS FACFAOM  Podiatric Foot & Ankle Surgeon  [facility]  [phone]

## 2020-09-10 ENCOUNTER — APPOINTMENT (OUTPATIENT)
Dept: OPTOMETRY | Facility: CLINIC | Age: 75
End: 2020-09-10
Payer: COMMERCIAL

## 2020-09-10 PROCEDURE — 92340 FIT SPECTACLES MONOFOCAL: CPT | Performed by: OPTOMETRIST

## 2020-09-15 DIAGNOSIS — G89.29 CHRONIC RIGHT-SIDED LOW BACK PAIN WITH RIGHT-SIDED SCIATICA: ICD-10-CM

## 2020-09-15 DIAGNOSIS — F11.90 CHRONIC, CONTINUOUS USE OF OPIOIDS: ICD-10-CM

## 2020-09-15 DIAGNOSIS — M54.41 CHRONIC RIGHT-SIDED LOW BACK PAIN WITH RIGHT-SIDED SCIATICA: ICD-10-CM

## 2020-09-15 NOTE — TELEPHONE ENCOUNTER
Reason for Call:  Medication or medication refill:    Do you use a [facility]?  Name of the pharmacy and phone number for the current request:     [facility] [address] [place] [address]    Name of the medication requested: traMADol (ULTRAM) 50 MG tablet       Other request: The patient called and stated that she needs this medication filled. She stated that she will take her last pill tomorrow so she will need it filled as soon as possible.     Can we leave a detailed message on this number? YES    Phone number patient can be reached at: Cell number on file:    Telephone Information:   Mobile [phone]       Best Time: Any    Call taken on 9/15/2020 at 1:51 PM by Shalonda Mustafa

## 2020-09-15 NOTE — LETTER
[facility]  [address] [address]  [phone]      9/18/2020       Essie Huddleston  [address]  [facility] [address]        Dear Essie,  We noticed that you are due for a follow up appointment with Dr. Driscoll. We will refill your prescription for 30 days. Please call to schedule to a virtual appointment with Dr. Driscoll either by telephone or videoto review address Pain Management.     Taking care of your health is important to us and we look forward to seeing you in the near future.  Please call us at [phone] or [phone] (or use [employer]) to schedule an appointment.     Please disregard this notice if you have already made an appointment.        Sincerely,      Luigi Driscoll MD/Shanti Balderrama, [facility]  Internal Medicine

## 2020-09-15 NOTE — TELEPHONE ENCOUNTER
Controlled Substance Refill Request for tramadol  Problem List Complete:  Yes    Last Written Prescription Date:  6/23/20  Last Fill Quantity: 60,   # refills: 2    THE MOST RECENT OFFICE VISIT MUST BE WITHIN THE PAST 3 MONTHS. AT LEAST ONE FACE TO FACE VISIT MUST OCCUR EVERY 6 MONTHS. ADDITIONAL VISITS CAN BE VIRTUAL.  (THIS STATEMENT SHOULD BE DELETED.)    [place] Office Visit with [facility] [place] provider: 7/2/20    Future Office visit:     Controlled substance agreement:   Encounter-Level CSA:    There are no encounter-level csa.     Patient-Level CSA:    Controlled Substance Agreement - Opioid - Scan on 1/27/2020 11:24 AM         Last Urine Drug Screen: No results found for: CDAUT, No results found for: COMDAT, No results found for: THC13, PCP13, COC13, MAMP13, OPI13, AMP13, BZO13, TCA13, MTD13, BAR13, OXY13, PPX13, BUP13     Processing:  Rx to be electronically transmitted to pharmacy by provider     [email or web address]   checked in past 3 months?  Yes .

## 2020-09-16 RX ORDER — TRAMADOL HYDROCHLORIDE 50 MG/1
50 TABLET ORAL 2 TIMES DAILY
Qty: 60 TABLET | Refills: 0 | Status: SHIPPED | OUTPATIENT
Start: 2020-09-16 | End: 2020-11-22

## 2020-09-16 NOTE — TELEPHONE ENCOUNTER
[place]  reviewed. Pt has been filling Rxs earlier than expected indicating occ taking more tramadol than prescribed 1 tab BID. RF done for 30 days. Pt to schedule virtual appt with me in the next 30 days to discuss pain management further. Assist with scheduling virtual appt

## 2020-10-01 ENCOUNTER — OFFICE VISIT (OUTPATIENT)
Dept: INTERNAL MEDICINE | Facility: CLINIC | Age: 75
End: 2020-10-01
Payer: COMMERCIAL

## 2020-10-01 ENCOUNTER — ANCILLARY PROCEDURE (OUTPATIENT)
Dept: GENERAL RADIOLOGY | Facility: CLINIC | Age: 75
End: 2020-10-01
Attending: INTERNAL MEDICINE
Payer: COMMERCIAL

## 2020-10-01 VITALS
DIASTOLIC BLOOD PRESSURE: 78 MMHG | SYSTOLIC BLOOD PRESSURE: 132 MMHG | BODY MASS INDEX: 34.2 KG/M2 | TEMPERATURE: 97.3 F | RESPIRATION RATE: 16 BRPM | HEART RATE: 64 BPM | OXYGEN SATURATION: 97 % | WEIGHT: 187 LBS

## 2020-10-01 DIAGNOSIS — G89.29 CHRONIC RIGHT SHOULDER PAIN: ICD-10-CM

## 2020-10-01 DIAGNOSIS — G89.29 CHRONIC RIGHT SHOULDER PAIN: Primary | ICD-10-CM

## 2020-10-01 DIAGNOSIS — Z23 NEED FOR PROPHYLACTIC VACCINATION AND INOCULATION AGAINST INFLUENZA: ICD-10-CM

## 2020-10-01 DIAGNOSIS — E11.21 TYPE 2 DIABETES MELLITUS WITH DIABETIC NEPHROPATHY, WITH LONG-TERM CURRENT USE OF INSULIN (H): ICD-10-CM

## 2020-10-01 DIAGNOSIS — M25.511 CHRONIC RIGHT SHOULDER PAIN: ICD-10-CM

## 2020-10-01 DIAGNOSIS — B00.9 HERPES SIMPLEX VIRUS INFECTION: ICD-10-CM

## 2020-10-01 DIAGNOSIS — M25.511 CHRONIC RIGHT SHOULDER PAIN: Primary | ICD-10-CM

## 2020-10-01 DIAGNOSIS — Z79.4 TYPE 2 DIABETES MELLITUS WITH DIABETIC NEPHROPATHY, WITH LONG-TERM CURRENT USE OF INSULIN (H): ICD-10-CM

## 2020-10-01 PROCEDURE — 90471 IMMUNIZATION ADMIN: CPT | Performed by: INTERNAL MEDICINE

## 2020-10-01 PROCEDURE — 90662 IIV NO PRSV INCREASED AG IM: CPT | Performed by: INTERNAL MEDICINE

## 2020-10-01 PROCEDURE — 73030 X-RAY EXAM OF SHOULDER: CPT | Mod: RT | Performed by: RADIOLOGY

## 2020-10-01 PROCEDURE — 99214 OFFICE O/P EST MOD 30 MIN: CPT | Mod: 25 | Performed by: INTERNAL MEDICINE

## 2020-10-01 RX ORDER — VALACYCLOVIR HYDROCHLORIDE 1 G/1
1000 TABLET, FILM COATED ORAL 3 TIMES DAILY
Qty: 21 TABLET | Refills: 0 | Status: SHIPPED | OUTPATIENT
Start: 2020-10-01 | End: 2020-10-15

## 2020-10-01 NOTE — PROGRESS NOTES
Subjective     Essie Huddleston is a 75 year old female who presents to clinic today for the following health issues:    HPI       Chief Complaint   Patient presents with     Diabetes     Derm Problem     Patient c/o a rash on her buttocks      Pain       Right Shoulder      Imm/Inj     Flu Shot       Diabetes Follow-up    How often are you checking your blood sugar? One  time daily  Blood sugar testing frequency justification:  Elevated A1c  What time of day are you checking your blood sugars (select all that apply)?  Always in the AM before eating, Rotate before lunch, before supper or before bedtime  Have you had any blood sugars above 200?  Yes-Once Last week  Have you had any blood sugars below 70?  No    What symptoms do you notice when your blood sugar is low?  Shaky, Dizzy, Weak, Lethargy and Confusion    What concerns do you have today about your diabetes? None     Do you have any of these symptoms? (Select all that apply)  Numbness in feet-Right       BP Readings from Last 2 Encounters:   09/08/20 130/76   07/02/20 127/72     Hemoglobin A1C (%)   Date Value   07/29/2020 6.8 (H)   12/26/2019 7.6 (H)     LDL Cholesterol Calculated (mg/dL)   Date Value   07/23/2020 31   12/26/2019 74                 How many servings of fruits and vegetables do you eat daily?  4 or more    On average, how many sweetened beverages do you drink each day (Examples: soda, juice, sweet tea, etc.  Do NOT count diet or artificially sweetened beverages)?   0    How many days per week do you exercise enough to make your heart beat faster? 3 or less    How many minutes a day do you exercise enough to make your heart beat faster? 10 - 19    How many days per week do you miss taking your medication? 0     Pt's past medical history, family history, habits, medications and allergies were reviewed with the patient today.  See snap shot for  HCM status. Most recent lab results reviewed with pt. Problem list and histories reviewed & adjusted, as  indicated.  Additional history as below:    Denies CP, SOB, abdominal pain, polyuria, polydipsia, vision changes, extremity numbness/parasthesias   History of recurrent vesicular rash lbilateral buttock area near midline. Minimal pain. No trauma.   Checking sugars in AM and < 130. Not checking other times of the day.  Pain right shoulder with abduction for months.  No trauma.  Denies weakness in the arm.  Neck feels okay      Component      Latest Ref Rng & Units 7/23/2020 7/29/2020   Sodium      133 - 144 mmol/L 140    Potassium      3.4 - 5.3 mmol/L 4.4    Chloride      94 - 109 mmol/L 107    Carbon Dioxide      20 - 32 mmol/L 28    Anion Gap      3 - 14 mmol/L 5    Glucose      70 - 99 mg/dL 87    Urea Nitrogen      7 - 30 mg/dL 9    Creatinine      0.52 - 1.04 mg/dL 0.63    GFR Estimate      >60 mL/min/1.73:m2 88    GFR Estimate If Black      >60 mL/min/1.73:m2 >90    Calcium      8.5 - 10.1 mg/dL 9.0    Bilirubin Total      0.2 - 1.3 mg/dL 0.3    Albumin      3.4 - 5.0 g/dL 3.8    Protein Total      6.8 - 8.8 g/dL 7.3    Alkaline Phosphatase      40 - 150 U/L 63    ALT      0 - 50 U/L 19    AST      0 - 45 U/L 10    Cholesterol      <200 mg/dL 127    Triglycerides      <150 mg/dL 141    HDL Cholesterol      >49 mg/dL 68    LDL Cholesterol Calculated      <100 mg/dL 31    Non HDL Cholesterol      <130 mg/dL 59    Creatinine Urine      mg/dL 48    Albumin Urine mg/L      mg/L 5    Albumin Urine mg/g Cr      0 - 25 mg/g Cr 10.90    Hemoglobin A1C      0 - 5.6 %  6.8 (H)        Additional ROS:   Constitutional, HEENT, Cardiovascular, Pulmonary, GI and , Neuro, MSK and Psych review of systems/symptoms are otherwise negative or unchanged from previous, except as noted above.      OBJECTIVE:  /78   Pulse 64   Temp 97.3  F (36.3  C) (Tympanic)   Resp 16   Wt 84.8 kg (187 lb)   SpO2 97%   BMI 34.20 kg/m     Estimated body mass index is 34.2 kg/m  as calculated from the following:    Height as of 9/8/20:  "1.575 m (5' 2\").    Weight as of this encounter: 84.8 kg (187 lb).   ns   Neck: no adenopathy. Thyroid normal to palpation. No bruits  Pulm: Lungs clear to auscultation   CV: Regular rates and rhythm  GI: Soft, obese, nontender, Normal active bowel sounds, No hepatosplenomegaly or masses palpable  Ext: Peripheral pulses intact. No edema.  Tenderness to abduction right shoulder beyond 80 degrees.  Minimal tenderness to internal/external rotation.  Mild tenderness to palpation right AC joint  Neuro: Normal strength and tone, sensory exam grossly normal  Skin: Vesicular rash just right and left of midline upper buttock crease area    Assessment/Plan: (See plan discussion below for further details)  1. Chronic right shoulder pain  Possible rotator cuff issue.  Also tenderness along the AC joint.  Given chronicity of symptoms, will check chest x-ray and refer to Ortho for possible cortisone injection versus therapy  - XR Shoulder Right 2 Views; Future  - Orthopedic & Spine  Referral; Future    2. Type 2 diabetes mellitus with diabetic nephropathy, with long-term current use of insulin (H)  Controlled.  Continue management.  Repeat diabetic labs in February 2020    3. Herpes simplex virus infection  Symptoms on both sides of midline so not shingles.  Will treat with 7-day course of valacyclovir.  If symptoms not resolving, patient to inform physician  -Valcyvlovir (CALTREX ) 1,000mg tablet; Take 1 tablet (1,000 mg) by mouth three times a day for 7 days  Dispense: 21 tablet; Refill: 0    4. Need for prophylactic vaccination and inoculation against influenza  - FLUZONE HIGH DOSE 65+  [13672]  - ADMIN INFLUENZA (For MEDICARE Patients ONLY) []    Plan discussion:   valcyclovir 1 tab three times a day for 7 days for skin rash   If recurrent rash in future, then call nurseline [phone]   Referral to  orthopedics for right shoulder injection. Their schedulers will call to schedule   Xray right shoulder   " Nonfasting labs in early February 2021. Call for appt [phone]  Continue other current medications       Luigi Driscoll MD  Internal Medicine Department  [facility]    (Chart documentation was completed, in part, with [employer] voice-recognition software. Even though reviewed, some grammatical, spelling, and word errors may remain.)        -Valcyvlovir (CALTREX ) 1,000mg tablet; Take 1 tablet (1,000 mg) by mouth three times a day for 7 days  Dispense: 21 tablet; Refill: 0

## 2020-10-01 NOTE — PATIENT INSTRUCTIONS
valcyclovir 1 tab three times a day for 7 days for skin rash   If recurrent rash in future, then call nurseline [phone]   Referral to  orthopedics for right shoulder injection. Their schedulers will call to schedule   Xray right shoulder   Nonfasting labs in early February 2021. Call for appt [phone]  Continue other current medications

## 2020-10-08 ENCOUNTER — OFFICE VISIT (OUTPATIENT)
Dept: ORTHOPEDICS | Facility: CLINIC | Age: 75
End: 2020-10-08
Attending: INTERNAL MEDICINE
Payer: COMMERCIAL

## 2020-10-08 VITALS
WEIGHT: 187 LBS | DIASTOLIC BLOOD PRESSURE: 75 MMHG | BODY MASS INDEX: 34.41 KG/M2 | SYSTOLIC BLOOD PRESSURE: 110 MMHG | HEIGHT: 62 IN

## 2020-10-08 DIAGNOSIS — M19.019 DJD OF AC (ACROMIOCLAVICULAR) JOINT: ICD-10-CM

## 2020-10-08 DIAGNOSIS — M25.511 CHRONIC RIGHT SHOULDER PAIN: ICD-10-CM

## 2020-10-08 DIAGNOSIS — G89.29 CHRONIC RIGHT SHOULDER PAIN: ICD-10-CM

## 2020-10-08 DIAGNOSIS — M19.011 PRIMARY OSTEOARTHRITIS OF RIGHT SHOULDER: ICD-10-CM

## 2020-10-08 DIAGNOSIS — M75.101 ROTATOR CUFF SYNDROME, RIGHT: Primary | ICD-10-CM

## 2020-10-08 PROCEDURE — 99203 OFFICE O/P NEW LOW 30 MIN: CPT | Performed by: FAMILY MEDICINE

## 2020-10-08 ASSESSMENT — MIFFLIN-ST. JEOR: SCORE: 1301.48

## 2020-10-08 NOTE — LETTER
10/8/2020         RE: Essie Huddleston  [address]  [facility] [address]        Dear Colleague,    Thank you for referring your patient, Essie Huddleston, to the [facility] [place]. Please see a copy of my visit note below.    [facility]   Clinic Visit s Oct 8, 2020    PCP: Luigi Driscoll    ASSESSMENT/PLAN    ICD-10-CM    1. Rotator cuff syndrome, right  M75.101 [facility] REFERRAL   2. Chronic right shoulder pain  M25.511 [facility] Sp[facility]  Referral    G89.29    3. DJD of AC (acromioclavicular) joint  M19.019 [facility] REFERRAL   4. Primary osteoarthritis of right shoulder  M19.011 [facility] REFERRAL     Mild degenerative elements, but with symptoms primary related to rotator cuff.  Partial tear cannot be excluded, but will start with physical therapy and see how things progress.  We discussed cortisone injections for acute pain affecting function, but at this point she did not think this was necessary.  If pain worsens, would start with a subacromial injection rather than glenohumeral.  Recheck as needed.            Today's Visit:  Essie is a 75 year old female who is seen in consultation at the request of Dr. Driscoll for   Chief Complaint   Patient presents with     Right Shoulder - Pain       Injury: Reports insidious onset without acute precipitating event.     Right hand dominant    Location of Pain: right shoulder superior, nonradiating   Duration of Pain: chronic, worsening, 3 year(s), intermittent  Rating of Pain at worst: 7/10  Rating of Pain Currently: 0/10  Pain is better with: pain medication: traMADol (ULTRAM)    Pain is worse with: laying down and lifting - sometimes nothing in particular  Treatment so far consists of: physical therapy in 2018, hand/wrist brace - right  Associated symptoms: pain  Recent imaging completed: X-rays completed 10/1/20.  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)    Past Medical History:   Diagnosis Date     Chronic right-sided low back pain with right-sided sciatica 12/29/2019     Essential hypertension with goal blood pressure less than 130/80 12/29/2019     Hyperlipidemia LDL goal <100 6/20/2012     Type 2 diabetes mellitus with diabetic nephropathy  (goal A1C<7) 10/24/2015     Vitamin D deficiency 6/20/2012       Patient Active Problem List    Diagnosis Date Noted     Nonproliferative diabetic retinopathy of both eyes without macular edema (H) 08/26/2020     Priority: Medium     Chronic right-sided low back pain without sciatica 07/17/2020     Priority: Medium     Palpitations 12/29/2019     Priority: Medium     Essential hypertension with goal blood pressure less than 130/80 12/29/2019     Priority: Medium     Type 2 diabetes mellitus with diabetic nephropathy, with long-term current use of insulin (H) 05/08/2018     Priority: Medium     Advanced directives, counseling/discussion 07/23/2012     Priority: Medium     Discussed advance care planning with patient; however, patient declined at this time. 7/23/2012          Hyperlipidemia LDL goal <100 06/20/2012     Priority: Medium     Vitamin D deficiency 06/20/2012     Priority: Medium       Family History   Problem Relation Age of Onset     Diabetes Mother      Cancer Father         prostate     Asthma Son        Social History     Socioeconomic History     Marital status:      Spouse name: None     Number of children: None     Years of education: None     Highest education level: None   Occupational History     None   Social Needs     Financial resource strain: None     Food insecurity     Worry: None     Inability: None     Transportation needs     Medical: None     Non-medical: None   Tobacco Use     Smoking status: Never Smoker     Smokeless tobacco: Never Used   Substance and Sexual Activity     Alcohol use: Yes     " Comment: seldom 1 glass wine.     Drug use: No     Past surgical history none    Review of Systems   Musculoskeletal: Positive for joint pain.   All other systems reviewed and are negative.        Physical Exam  /75   Ht 1.575 m (5' 2\")   Wt 84.8 kg (187 lb)   BMI 34.20 kg/m    Constitutional:well-developed, well-nourished, and in no distress.   Cardiovascular: Intact distal pulses.    Neurological: alert. Gait Normal:   Gait, station, stance, and balance appear normal for age  Skin: Skin is warm and dry.   Psychiatric: Mood and affect normal.   Respiratory: unlabored, speaks in full sentences  Lymph: no LAD, no lymphangitis      Right Shoulder Exam     Tenderness   The patient is experiencing no tenderness.    Range of Motion   Active abduction: normal   Passive abduction: normal   Extension: normal   External rotation: normal   Forward flexion: normal   Internal rotation 0 degrees: normal     Muscle Strength   Abduction: 5/5   Internal rotation: 5/5   External rotation: 4/5   Supraspinatus: 4/5   Subscapularis: 5/5   Biceps: 5/5     Tests   Apprehension: negative  Pederson test: positive  Cross arm: negative  Impingement: positive  Drop arm: positive  Sulcus: absent    Other   Erythema: absent  Scars: absent  Sensation: normal  Pulse: present                  X-ray images Previously done and independently reviewed by me in the office today with the patient. X-ray shows:     XR SHOULDER 2 VIEW RIGHT 10/1/2020 6:21 PM      HISTORY: right shoulder pain with abduction for  months. Fall in  distant past; Chronic right shoulder pain; Chronic right shoulder pain     COMPARISON: None.                                                                      IMPRESSION: Mild hypertrophic changes in the glenohumeral and  acromioclavicular joints. No fractures are evident.      LIZ RODRIGUEZ MD      Again, thank you for allowing me to participate in the care of your patient.        Sincerely,        Rod Varghese" MD [place]

## 2020-10-08 NOTE — PROGRESS NOTES
[facility]   Clinic Visit s Oct 8, 2020    PCP: Luigi Driscoll    ASSESSMENT/PLAN    ICD-10-CM    1. Rotator cuff syndrome, right  M75.101 [facility] REFERRAL   2. Chronic right shoulder pain  M25.511 Orthopedic & Spine  Referral    G89.29    3. DJD of AC (acromioclavicular) joint  M19.019 [facility] REFERRAL   4. Primary osteoarthritis of right shoulder  M19.011 [facility] REFERRAL     Mild degenerative elements, but with symptoms primary related to rotator cuff.  Partial tear cannot be excluded, but will start with physical therapy and see how things progress.  We discussed cortisone injections for acute pain affecting function, but at this point she did not think this was necessary.  If pain worsens, would start with a subacromial injection rather than glenohumeral.  Recheck as needed.            Today's Visit:  Essie is a 75 year old female who is seen in consultation at the request of Dr. Driscoll for   Chief Complaint   Patient presents with     Right Shoulder - Pain       Injury: Reports insidious onset without acute precipitating event.     Right hand dominant    Location of Pain: right shoulder superior, nonradiating   Duration of Pain: chronic, worsening, 3 year(s), intermittent  Rating of Pain at worst: 7/10  Rating of Pain Currently: 0/10  Pain is better with: pain medication: traMADol (ULTRAM)    Pain is worse with: laying down and lifting - sometimes nothing in particular  Treatment so far consists of: physical therapy in 2018, hand/wrist brace - right  Associated symptoms: pain  Recent imaging completed: X-rays completed 10/1/20.  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)    Past Medical History:   Diagnosis Date     Chronic right-sided low back pain with right-sided sciatica 12/29/2019     Essential hypertension with goal blood pressure  "less than 130/80 12/29/2019     Hyperlipidemia LDL goal <100 6/20/2012     Type 2 diabetes mellitus with diabetic nephropathy  (goal A1C<7) 10/24/2015     Vitamin D deficiency 6/20/2012       Patient Active Problem List    Diagnosis Date Noted     Nonproliferative diabetic retinopathy of both eyes without macular edema (H) 08/26/2020     Priority: Medium     Chronic right-sided low back pain without sciatica 07/17/2020     Priority: Medium     Palpitations 12/29/2019     Priority: Medium     Essential hypertension with goal blood pressure less than 130/80 12/29/2019     Priority: Medium     Type 2 diabetes mellitus with diabetic nephropathy, with long-term current use of insulin (H) 05/08/2018     Priority: Medium     Advanced directives, counseling/discussion 07/23/2012     Priority: Medium     Discussed advance care planning with patient; however, patient declined at this time. 7/23/2012          Hyperlipidemia LDL goal <100 06/20/2012     Priority: Medium     Vitamin D deficiency 06/20/2012     Priority: Medium       Family History   Problem Relation Age of Onset     Diabetes Mother      Cancer Father         prostate     Asthma Son        Social History     Socioeconomic History     Marital status:      Spouse name: None     Number of children: None     Years of education: None     Highest education level: None   Occupational History     None   Social Needs     Financial resource strain: None     Food insecurity     Worry: None     Inability: None     Transportation needs     Medical: None     Non-medical: None   Tobacco Use     Smoking status: Never Smoker     Smokeless tobacco: Never Used   Substance and Sexual Activity     Alcohol use: Yes     Comment: seldom 1 glass wine.     Drug use: No     Past surgical history none    Review of Systems   Musculoskeletal: Positive for joint pain.   All other systems reviewed and are negative.        Physical Exam  /75   Ht 1.575 m (5' 2\")   Wt 84.8 kg (187 " lb)   BMI 34.20 kg/m    Constitutional:well-developed, well-nourished, and in no distress.   Cardiovascular: Intact distal pulses.    Neurological: alert. Gait Normal:   Gait, station, stance, and balance appear normal for age  Skin: Skin is warm and dry.   Psychiatric: Mood and affect normal.   Respiratory: unlabored, speaks in full sentences  Lymph: no LAD, no lymphangitis      Right Shoulder Exam     Tenderness   The patient is experiencing no tenderness.    Range of Motion   Active abduction: normal   Passive abduction: normal   Extension: normal   External rotation: normal   Forward flexion: normal   Internal rotation 0 degrees: normal     Muscle Strength   Abduction: 5/5   Internal rotation: 5/5   External rotation: 4/5   Supraspinatus: 4/5   Subscapularis: 5/5   Biceps: 5/5     Tests   Apprehension: negative  Pederson test: positive  Cross arm: negative  Impingement: positive  Drop arm: positive  Sulcus: absent    Other   Erythema: absent  Scars: absent  Sensation: normal  Pulse: present                  X-ray images Previously done and independently reviewed by me in the office today with the patient. X-ray shows:     XR SHOULDER 2 VIEW RIGHT 10/1/2020 6:21 PM      HISTORY: right shoulder pain with abduction for  months. Fall in  distant past; Chronic right shoulder pain; Chronic right shoulder pain     COMPARISON: None.                                                                      IMPRESSION: Mild hypertrophic changes in the glenohumeral and  acromioclavicular joints. No fractures are evident.      LIZ RODRIGUEZ MD

## 2020-10-12 ENCOUNTER — THERAPY VISIT (OUTPATIENT)
Dept: PHYSICAL THERAPY | Facility: CLINIC | Age: 75
End: 2020-10-12
Payer: COMMERCIAL

## 2020-10-12 DIAGNOSIS — M19.019 DJD OF AC (ACROMIOCLAVICULAR) JOINT: ICD-10-CM

## 2020-10-12 DIAGNOSIS — M19.011 PRIMARY OSTEOARTHRITIS OF RIGHT SHOULDER: ICD-10-CM

## 2020-10-12 DIAGNOSIS — M25.511 ACUTE PAIN OF RIGHT SHOULDER: ICD-10-CM

## 2020-10-12 DIAGNOSIS — M75.101 ROTATOR CUFF SYNDROME, RIGHT: ICD-10-CM

## 2020-10-12 PROCEDURE — 97530 THERAPEUTIC ACTIVITIES: CPT | Mod: GP

## 2020-10-12 PROCEDURE — 97161 PT EVAL LOW COMPLEX 20 MIN: CPT | Mod: GP

## 2020-10-12 NOTE — PROGRESS NOTES
[facility] Initial Evaluation  Subjective:    Therapist Generated HPI Evaluation  Problem details: Onset: 3 years ago; CC: pain on top of shoulder; sometimes goes down the back side and then down the outside but mostly on the top; lying on R side makes it worse.         Type of problem:  Right shoulder.    This is a new condition.  Condition occurred with:  Degenerative joint disease.  Where condition occurred: for unknown reasons.  Patient reports pain:  Anterior (top of shoulder/AC joint).  Pain is described as aching and is intermittent.    Since onset symptoms are unchanged.  Associated symptoms:  Loss of motion/stiffness and loss of strength. Symptoms are exacerbated by lying on extremity and certain positions  Relieved by: changing positions           Patient Health History             Pertinent medical history includes: diabetes, high blood pressure and numbness/tingling.                                                       Objective:  System                   Shoulder Evaluation:  ROM:  AROM:    Flexion:  Right:  150    Abduction:  Right:  140    Internal Rotation:  Right:  40  External Rotation:  Right:  100                      Strength:    Flexion: Right: 4-/5      Pain:  +    Abduction:  Left: 4-/5   Pain:+        Internal Rotation:  Right: 4/5      Pain:-/+  External Rotation:   Right:4-/5      Pain:+              Special Tests:  Special tests assessed shoulder: Inconclusive RCT.      Palpation:  Palpation assessed shoulder: Mild TTP AC joint;                                          General     ROS    Assessment/Plan:    Patient is a 75 year old female with right side shoulder complaints.    Patient has the following significant findings with corresponding treatment plan.                Diagnosis 1:  R sh pain, AC DJD, RTC syndrome, OA R shoulder;  Inconclusive RCT;   Decreased ROM/flexibility - manual therapy and therapeutic exercise  Decreased strength - therapeutic exercise and  therapeutic activities  Impaired muscle performance - neuro re-education  Decreased function - therapeutic activities    Therapy Evaluation Codes:     Previous and current functional limitations:  (See Goal Flow Sheet for this information)    Short term and Long term goals: (See Goal Flow Sheet for this information)     Communication ability:  Patient appears to be able to clearly communicate and understand verbal and written communication and follow directions correctly.  Treatment Explanation - The following has been discussed with the patient:   RX ordered/plan of care  Anticipated outcomes  Possible risks and side effects  This patient would benefit from PT intervention to resume normal activities.   Rehab potential is good.    Frequency:  1 X week, once daily  Duration:  for 8 weeks  Discharge Plan:  Achieve all LTG.  Independent in home treatment program.  Reach maximal therapeutic benefit.    Please refer to the daily flowsheet for treatment today, total treatment time and time spent performing 1:1 timed codes.

## 2020-10-15 DIAGNOSIS — B00.9 HERPES SIMPLEX VIRUS INFECTION: ICD-10-CM

## 2020-10-15 RX ORDER — VALACYCLOVIR HYDROCHLORIDE 1 G/1
TABLET, FILM COATED ORAL
Qty: 21 TABLET | Refills: 0 | Status: SHIPPED | OUTPATIENT
Start: 2020-10-15 | End: 2022-03-05

## 2020-11-01 PROBLEM — B00.9 HERPES SIMPLEX VIRUS INFECTION: Status: ACTIVE | Noted: 2020-11-01

## 2020-11-01 PROBLEM — R00.2 PALPITATIONS: Status: RESOLVED | Noted: 2019-12-29 | Resolved: 2020-11-01

## 2020-11-01 PROBLEM — M25.511 CHRONIC RIGHT SHOULDER PAIN: Status: ACTIVE | Noted: 2020-11-01

## 2020-11-01 PROBLEM — G89.29 CHRONIC RIGHT SHOULDER PAIN: Status: ACTIVE | Noted: 2020-11-01

## 2020-11-06 ENCOUNTER — TELEPHONE (OUTPATIENT)
Dept: INTERNAL MEDICINE | Facility: CLINIC | Age: 75
End: 2020-11-06

## 2020-11-06 ENCOUNTER — OFFICE VISIT (OUTPATIENT)
Dept: INTERNAL MEDICINE | Facility: CLINIC | Age: 75
End: 2020-11-06
Payer: COMMERCIAL

## 2020-11-06 VITALS
SYSTOLIC BLOOD PRESSURE: 132 MMHG | BODY MASS INDEX: 35.48 KG/M2 | OXYGEN SATURATION: 95 % | TEMPERATURE: 97.8 F | HEART RATE: 72 BPM | RESPIRATION RATE: 16 BRPM | DIASTOLIC BLOOD PRESSURE: 72 MMHG | WEIGHT: 194 LBS

## 2020-11-06 DIAGNOSIS — L30.9 DERMATITIS: ICD-10-CM

## 2020-11-06 DIAGNOSIS — Z79.4 TYPE 2 DIABETES MELLITUS WITH DIABETIC NEPHROPATHY, WITH LONG-TERM CURRENT USE OF INSULIN (H): ICD-10-CM

## 2020-11-06 DIAGNOSIS — E11.21 TYPE 2 DIABETES MELLITUS WITH DIABETIC NEPHROPATHY, WITH LONG-TERM CURRENT USE OF INSULIN (H): ICD-10-CM

## 2020-11-06 DIAGNOSIS — E66.01 MORBID OBESITY (H): ICD-10-CM

## 2020-11-06 DIAGNOSIS — L30.9 HAND DERMATITIS: Primary | ICD-10-CM

## 2020-11-06 PROCEDURE — 99214 OFFICE O/P EST MOD 30 MIN: CPT | Performed by: PHYSICIAN ASSISTANT

## 2020-11-06 RX ORDER — HYDROXYZINE PAMOATE 25 MG/1
25 CAPSULE ORAL 3 TIMES DAILY PRN
Qty: 30 CAPSULE | Refills: 0 | Status: SHIPPED | OUTPATIENT
Start: 2020-11-06 | End: 2020-12-21

## 2020-11-06 RX ORDER — TRIAMCINOLONE ACETONIDE 1 MG/G
CREAM TOPICAL 3 TIMES DAILY
Qty: 45 G | Refills: 1 | Status: SHIPPED | OUTPATIENT
Start: 2020-11-06 | End: 2022-08-21

## 2020-11-06 NOTE — PATIENT INSTRUCTIONS
"Use skin moisturizers such as Cetaphil, Eucerin and Lubriderm or bath additives such as Aveeno or AlphaKeri. Avoid excessive soap and water which may dry the skin. sensitive skin cares, avoiding perfumed skin products, detergents, and soaps.   Cool water for bathing  Medication for itching.       No signs of infection - no herpes rash. Or shingles.               Patient Education     Insulin: How to Use and Where to Inject  You give yourself insulin as a shot (injection). It's injected in the fatty layer under the skin (subcutaneous). Some people use an implanted device called an insulin pump. Others inject insulin using prefilled \"pens.\" Your healthcare team will teach you how to use insulin. Make sure you follow all instructions about when and where you use it.  Where to inject your insulin    Insulin is most often injected in belly (abdominal) fat. That is where it is absorbed fastest.    Change the injection site each time you give yourself insulin. This helps prevent problems.    Plan out how you will move from site to site.    Leave at least 2 inches around your belly button (navel).  Ask your healthcare provider to teach you about rotating your injection site. This will help prevent a bump from forming under the skin from using the same spot. Also ask how to prevent injecting it into the muscle. Injecting the muscle or into the bump can lead to incorrect insulin absorption.     Injection sites in adults include the belly (abdomen), front of thighs, back of upper arms and upper buttocks.   When to inject your insulin    Follow your healthcare provider's instructions about when to give yourself insulin.    It's very important to time your insulin shots with meals or snacks.    Getting ready to inject insulin from a bottle    Wash your hands. Use soap and clean, running water.    Check the expiration date on the insulin. Don't use  insulin.    Look at the insulin. Clear insulin should not be discolored or " have crystals. Cloudy insulin should not have clumps or crystals stuck to the side of the vial or pen.    Wipe the top of the insulin bottle (vial) with alcohol.    Single-dose prep  1. Pull back the plunger until the end of the plunger is even with the number of units of insulin you take. Always read the numbers of units of insulin at your eye level.   2. Put the needle into the top of the bottle. Then push the plunger in all the way. This pushes air into the insulin bottle.  3. Turn the bottle and syringe upside down. The bottle will be on top.  4. Hold the needle and bottle straight up and down. Check that the needle is in the insulin.  5. Pull back on the plunger until the end of the plunger is even with the number of units of insulin you take.  6. Remove the needle. Then tap the syringe with a fingertip to remove any air bubbles.    Mixed-dose prep  Important: Some insulins should not be mixed. Always check with your healthcare provider before mixing insulin.  1. Before you start, add up the 2 insulin doses. This is so that you will know the total of the 2 doses. For example, you need 6 units of regular (clear) insulin and 7 units of NPH (cloudy). Your total will be 13 units.  2. Pull back the plunger until the end of the plunger is even with the number of units of insulin you take. Always read the numbers of units of insulin at your eye level.   3. Put the needle into the top of the bottle. Then push the plunger in all the way. This pushes air into the insulin bottle.  4. If you use both regular and NPH insulin in a single syringe, carefully remove the needle from the first bottle. Repeat the above steps for second bottle.  5. With both bottles pre-filled with air, you are now ready to draw up the insulin. Always draw up regular (clear) insulin before NPH (cloudy). Put the needle in the bottle of regular (clear) insulin.  6. Turn the bottle and syringe upside down. The bottle will be on top.  7. Hold the  needle and bottle straight up and down. Check that the needle is in the insulin.  8. Pull back on the plunger until the end of the plunger is even with the number of units of regular insulin you take.  9. Remove the needle from the regular (clear) insulin. Insert it into the NPH (cloudy) insulin bottle. Be careful not to push on the plunger.  10. Turn the bottle and syringe upside down. The bottle will be on top.  11. Hold the needle and bottle straight up and down. Check that the needle is in the insulin.  12. Pull back on the plunger until the end of the plunger is even with the total number of units you are taking. This number is the total of the 2 insulin doses together as shown in step 1 above.  13. Remove the needle. Then tap the syringe with a fingertip to remove any air bubbles.  Injecting the insulin    Gently pinch up about 1 inch of skin. Don't squeeze the skin.    Put the needle straight into the skin, at a right-hand (90-degree) angle. Don't slant it.    Push in the plunger. Press until the syringe is empty. Let go of the skin. Then remove the needle. Don t rub the site after you remove the needle.    Getting rid of the syringe    Put the needle and syringe in a sharps container. Don t recap the needle.    You can buy a sharps container at a drugstore or medical supply store. Or you can also use an empty laundry detergent bottle or any other puncture-proof container and lid.    When the sharps container is full, put it into a garbage bag and secure the top. Label the bag  needles  or  sharps.     Call your local waste company to ask about removing the sharps container. You can also check with the [employer] Needle Disposal at 988-352-7324 or www.safeneedledisposal.org.    Storing your insulin    Keep unopened insulin bottles in the refrigerator. An open bottle can be stored at room temperature, such as on the kitchen counter. But don t let the insulin get too hot. Always keep it below  86 F (30 C). And never let it freeze.    Always use insulin before the expiration date on the bottle. Throw  bottles away.    Use insulin within 28 days of opening the bottle. After 28 days, throw it away. To remember, write the date you opened it on the bottle.    When you travel, take all of your diabetes supplies. Put them in a bag made to protect insulin from heat and cold. Always keep them with you. You will have what you need if there is a delay or your suitcase is lost.    Never leave insulin in the car. It can get too hot or too cold.    [employer] last reviewed this educational content on 2018-2020 [employer]. [address] [place] [address], [place] [address]. All rights reserved. This information is not intended as a substitute for professional medical care. Always follow your healthcare professional's instructions.

## 2020-11-06 NOTE — TELEPHONE ENCOUNTER
"Called patient and daughter, both on phone. Rash spreading. Previously saw Dr. Driscoll last month and had rash on buttocks. Has come to hands and arms. Gone on buttocks. Scars on bottom from where it was. Describes rash as little bumps and hives, swelling. Some are flat and swollen. Some are like the ones that were on her bottom. Itches, scratches it and it is a little painful. Becoming open. Skin peels if scratches too much. Dry. Clear drainage. Places that are draining are blister looking. Not all are draining. Feels it looks like hives. Also has spot on nose that is not draining. Finished one course of Valtrex and then they got another when when to  other prescriptions. They are confused if she is supposed to take another course? Already took one pill today but daughter told her to wait until she talk to doctor. Also complains of not feeling well. Fatigued. Unsure if related. And having mood issues- patient says she was not in high spirits earlier. Feeling \"no mood, no spirit\" in evenings and mornings.     Advised appt today due to changing/worsening and new symptoms but they declined virtual/in clinic appt for now, would like to see what PCP thinks first. Wondering if she should continue the second course of valacyclovir?     And, daughter also notes she has been giving patient's insulin shots in her buttocks now because she had \"too many\" in her abdomen. Is this an ok site to inject or would you advise somewhere else? Wants to know where she can safely inject?   "

## 2020-11-06 NOTE — TELEPHONE ENCOUNTER
"Patient informed. Unable to reach daughter. She will review valtrex further at [facility] with Shanti Moser - can you also give patient information on insulin injection sites at [facility]? This part of message was not addressed by Dr. Driscoll. Daughter has been injecting patient's insulin into her butt because says has had \"too many\" injections in her abdomen. Unsure if this is ok and if there is a certain area of buttocks they should be injecting into if yes? Or other alternative sites?  "

## 2020-11-06 NOTE — TELEPHONE ENCOUNTER
Reason for Call:  Other     Detailed comments: went to pharmacy picked up another rx for   Valacyclovir 1000 mg.  Patient still has rash now moving to hands and arms, looks like hives.  Daughter Haydee called  call her first.  Should patient stay on this medication            Phone Number Patient can be reached at: Home number on file [phone] (home)  Call mother 2nd    Best Time: today    Can we leave a detailed message on this number? YES    Call taken on 11/6/2020 at 8:53 AM by ISAMAR BRAVO

## 2020-11-06 NOTE — TELEPHONE ENCOUNTER
I did not authorize refill of valtrex. That was done by triage nurse without my knowledge. Needs to be seen today re: rash as quite different than limited buttock rash I saw.  I have no openings this afternoon. recommend pt see PA in open slot. Inform daughter/pt and schedule appt

## 2020-11-22 DIAGNOSIS — G89.29 CHRONIC RIGHT-SIDED LOW BACK PAIN WITHOUT SCIATICA: ICD-10-CM

## 2020-11-22 DIAGNOSIS — F11.90 CHRONIC, CONTINUOUS USE OF OPIOIDS: ICD-10-CM

## 2020-11-22 DIAGNOSIS — G89.29 CHRONIC RIGHT-SIDED LOW BACK PAIN WITH RIGHT-SIDED SCIATICA: ICD-10-CM

## 2020-11-22 DIAGNOSIS — M54.50 CHRONIC RIGHT-SIDED LOW BACK PAIN WITHOUT SCIATICA: ICD-10-CM

## 2020-11-22 DIAGNOSIS — M54.41 CHRONIC RIGHT-SIDED LOW BACK PAIN WITH RIGHT-SIDED SCIATICA: ICD-10-CM

## 2020-11-22 RX ORDER — TRAMADOL HYDROCHLORIDE 50 MG/1
TABLET ORAL
Qty: 60 TABLET | Refills: 2 | Status: SHIPPED | OUTPATIENT
Start: 2020-11-22 | End: 2021-02-18

## 2020-11-22 NOTE — TELEPHONE ENCOUNTER
Timing of RF appropriate.  UTD re: clinic visit/evisit every 3 months. [place]  site [place] on file. Rx electronically sent to pharmacy

## 2020-12-11 DIAGNOSIS — Z79.4 TYPE 2 DIABETES MELLITUS WITH DIABETIC NEPHROPATHY, WITH LONG-TERM CURRENT USE OF INSULIN (H): ICD-10-CM

## 2020-12-11 DIAGNOSIS — E11.21 TYPE 2 DIABETES MELLITUS WITH DIABETIC NEPHROPATHY, WITH LONG-TERM CURRENT USE OF INSULIN (H): ICD-10-CM

## 2020-12-11 NOTE — LETTER
[facility]  [address] [address]  [phone]      12/14/2020       Essie Huddleston  [address]  [facility] [address]        Dear Essie,  This is a reminder that you are due for fasting labs in late February 2021. Please call to schedule a fasting lab appointment at the number listed above.     Sincerely,      Luigi Driscoll MD/Shanti Balderrama, [facility]  Internal Medicine

## 2020-12-17 PROBLEM — M75.101 ROTATOR CUFF SYNDROME, RIGHT: Status: RESOLVED | Noted: 2020-10-12 | Resolved: 2020-12-17

## 2020-12-17 PROBLEM — M19.011 PRIMARY OSTEOARTHRITIS OF RIGHT SHOULDER: Status: RESOLVED | Noted: 2020-10-12 | Resolved: 2020-12-17

## 2020-12-17 PROBLEM — M19.019 DJD OF AC (ACROMIOCLAVICULAR) JOINT: Status: RESOLVED | Noted: 2020-10-12 | Resolved: 2020-12-17

## 2020-12-19 DIAGNOSIS — I10 ESSENTIAL HYPERTENSION WITH GOAL BLOOD PRESSURE LESS THAN 130/80: ICD-10-CM

## 2020-12-19 DIAGNOSIS — M54.41 CHRONIC RIGHT-SIDED LOW BACK PAIN WITH RIGHT-SIDED SCIATICA: ICD-10-CM

## 2020-12-19 DIAGNOSIS — L30.9 DERMATITIS: ICD-10-CM

## 2020-12-19 DIAGNOSIS — G89.29 CHRONIC RIGHT-SIDED LOW BACK PAIN WITH RIGHT-SIDED SCIATICA: ICD-10-CM

## 2020-12-19 DIAGNOSIS — E78.5 HYPERLIPIDEMIA LDL GOAL <100: ICD-10-CM

## 2020-12-19 DIAGNOSIS — L30.9 HAND DERMATITIS: ICD-10-CM

## 2020-12-20 RX ORDER — AMLODIPINE BESYLATE 5 MG/1
TABLET ORAL
Qty: 90 TABLET | Refills: 3 | Status: SHIPPED | OUTPATIENT
Start: 2020-12-20 | End: 2021-12-16

## 2020-12-20 RX ORDER — SIMVASTATIN 20 MG
TABLET ORAL
Qty: 90 TABLET | Refills: 2 | Status: SHIPPED | OUTPATIENT
Start: 2020-12-20 | End: 2021-06-09

## 2020-12-20 RX ORDER — GABAPENTIN 300 MG/1
CAPSULE ORAL
Qty: 270 CAPSULE | Refills: 3 | Status: SHIPPED | OUTPATIENT
Start: 2020-12-20 | End: 2022-01-17

## 2020-12-21 DIAGNOSIS — E11.21 TYPE 2 DIABETES MELLITUS WITH DIABETIC NEPHROPATHY, WITH LONG-TERM CURRENT USE OF INSULIN (H): ICD-10-CM

## 2020-12-21 DIAGNOSIS — Z79.4 TYPE 2 DIABETES MELLITUS WITH DIABETIC NEPHROPATHY, WITH LONG-TERM CURRENT USE OF INSULIN (H): ICD-10-CM

## 2020-12-21 RX ORDER — HYDROXYZINE PAMOATE 25 MG/1
CAPSULE ORAL
Qty: 30 CAPSULE | Refills: 3 | Status: SHIPPED | OUTPATIENT
Start: 2020-12-21 | End: 2023-04-28

## 2020-12-24 NOTE — TELEPHONE ENCOUNTER
Reason for call:  Med refill  Phone number to reach patient:  Cell number on file:    Telephone Information:   Mobile [phone]       Best Time:  Anytime     Can we leave a detailed message on this number?  YES    Travel screening: Not Applicable

## 2020-12-30 NOTE — TELEPHONE ENCOUNTER
There is no information provided in this message what medication  is being requested for refill. I cannot address. Call pt to get more info and have pt contact pharmacy in future when requesting refills

## 2020-12-30 NOTE — TELEPHONE ENCOUNTER
Patient has received refills. Patient scheduled for appointment to discuss lab appointment in late January.   Glory Mohr CMA on 12/30/2020 at 11:19 AM

## 2021-01-26 DIAGNOSIS — E11.21 TYPE 2 DIABETES MELLITUS WITH DIABETIC NEPHROPATHY, WITH LONG-TERM CURRENT USE OF INSULIN (H): ICD-10-CM

## 2021-01-26 DIAGNOSIS — Z79.4 TYPE 2 DIABETES MELLITUS WITH DIABETIC NEPHROPATHY, WITH LONG-TERM CURRENT USE OF INSULIN (H): ICD-10-CM

## 2021-01-26 LAB — HBA1C MFR BLD: 7.3 % (ref 0–5.6)

## 2021-01-26 PROCEDURE — 80048 BASIC METABOLIC PNL TOTAL CA: CPT | Performed by: INTERNAL MEDICINE

## 2021-01-26 PROCEDURE — 36415 COLL VENOUS BLD VENIPUNCTURE: CPT | Performed by: INTERNAL MEDICINE

## 2021-01-26 PROCEDURE — 83036 HEMOGLOBIN GLYCOSYLATED A1C: CPT | Performed by: INTERNAL MEDICINE

## 2021-01-27 LAB
ANION GAP SERPL CALCULATED.3IONS-SCNC: 3 MMOL/L (ref 3–14)
BUN SERPL-MCNC: 22 MG/DL (ref 7–30)
CALCIUM SERPL-MCNC: 9.8 MG/DL (ref 8.5–10.1)
CHLORIDE SERPL-SCNC: 105 MMOL/L (ref 94–109)
CO2 SERPL-SCNC: 31 MMOL/L (ref 20–32)
CREAT SERPL-MCNC: 0.95 MG/DL (ref 0.52–1.04)
GFR SERPL CREATININE-BSD FRML MDRD: 58 ML/MIN/{1.73_M2}
GLUCOSE SERPL-MCNC: 70 MG/DL (ref 70–99)
POTASSIUM SERPL-SCNC: 4.4 MMOL/L (ref 3.4–5.3)
SODIUM SERPL-SCNC: 139 MMOL/L (ref 133–144)

## 2021-01-28 NOTE — RESULT ENCOUNTER NOTE
Results reviewed. Will discuss them soon with pt at upcoming scheduled appt 2/4/21. See clinic note from that visit for future plan

## 2021-02-04 ENCOUNTER — OFFICE VISIT (OUTPATIENT)
Dept: INTERNAL MEDICINE | Facility: CLINIC | Age: 76
End: 2021-02-04
Payer: COMMERCIAL

## 2021-02-04 VITALS
RESPIRATION RATE: 16 BRPM | TEMPERATURE: 98.5 F | HEIGHT: 62 IN | DIASTOLIC BLOOD PRESSURE: 70 MMHG | BODY MASS INDEX: 35.7 KG/M2 | OXYGEN SATURATION: 97 % | WEIGHT: 194 LBS | SYSTOLIC BLOOD PRESSURE: 132 MMHG | HEART RATE: 71 BPM

## 2021-02-04 DIAGNOSIS — G62.9 NEUROPATHY: ICD-10-CM

## 2021-02-04 DIAGNOSIS — E11.21 TYPE 2 DIABETES MELLITUS WITH DIABETIC NEPHROPATHY, WITH LONG-TERM CURRENT USE OF INSULIN (H): ICD-10-CM

## 2021-02-04 DIAGNOSIS — Z79.4 TYPE 2 DIABETES MELLITUS WITH DIABETIC NEPHROPATHY, WITH LONG-TERM CURRENT USE OF INSULIN (H): ICD-10-CM

## 2021-02-04 DIAGNOSIS — R06.83 SNORING: ICD-10-CM

## 2021-02-04 DIAGNOSIS — R09.81 NASAL CONGESTION: ICD-10-CM

## 2021-02-04 PROCEDURE — 99207 PR FOOT EXAM NO CHARGE: CPT | Mod: 25 | Performed by: INTERNAL MEDICINE

## 2021-02-04 PROCEDURE — 99214 OFFICE O/P EST MOD 30 MIN: CPT | Performed by: INTERNAL MEDICINE

## 2021-02-04 RX ORDER — FLUTICASONE PROPIONATE 50 MCG
2 SPRAY, SUSPENSION (ML) NASAL AT BEDTIME
Qty: 16 G | Refills: 5 | Status: SHIPPED | OUTPATIENT
Start: 2021-02-04 | End: 2021-12-06

## 2021-02-04 ASSESSMENT — MIFFLIN-ST. JEOR: SCORE: 1333.23

## 2021-02-04 NOTE — PROGRESS NOTES
Assessment & Plan     ASSESSMENT:   1. Type 2 diabetes mellitus with diabetic nephropathy, with long-term current use of insulin (H)  Controlled for age. Continue meds and repeat fasting labs in 4 mos including A1C and previously ordered lipids, CMP, etc  - Hemoglobin A1c; Future  - FOOT EXAM    2. Neuropathy  Probable carpal tunnel syndrome. Needs EMG to confirm and will then refer onto to hand surgeon. Already using wrist braces  - NEUROLOGY ADULT REFERRAL    3. Snoring   With body habitus and sx, high risk for KIM. Pt to see sleep clinic for sleep study eval. Counseled re: weight loss  - SLEEP EVALUATION & MANAGEMENT REFERRAL - ADULT -[facility] [phone]  (Age 18 and up); Future    4. Nasal congestion  No sinus pain. Contributes to possible apnea. Will treat with Flonase  - fluticasone (FLONASE) 50 MCG/ACT nasal spray; Spray 2 sprays into both nostrils At Bedtime  Dispense: 16 g; Refill: 5      PLAN:   Referral to  Sleep clinic re: probable  sleep apnea. Call [phone] for  appt    Fluticasone/Flonase 1-2 spray each nostril  daily at bedtime   EMG  Bilateral upper  extremity  at [facility]. They will call to schedule  Continue same medications   Fasting labs in 4 months (blood and urine). See me a few days later  Reduce calorie/carbohydrate (sugar, bread, potato, pasta, rice, alcohol etc)  intake in diet for weight loss.  Increase color on your plate with fruits and vegetables. Increase  frequency of walking or other aerobic exercise as able         (Chart documentation was completed, in part, with [employer] voice-recognition software. Even though reviewed, some grammatical, spelling, and word errors may remain.)    Luigi Driscoll MD  Internal Medicine Department  [facility]      Netta Fountain is a 75 year old who presents to clinic today for the following health issues     HPI       Diabetes Follow-up    How often are you checking  your blood sugar? One time daily  What time of day are you checking your blood sugars (select all that apply)?  Varies between Morning, Noon and Evening  Have you had any blood sugars above 200?  Yes once  Have you had any blood sugars below 70?  No    What symptoms do you notice when your blood sugar is low?  Shaky, Lethargy and Confusion    What concerns do you have today about your diabetes?  Other: Tingling in hand related to Diabetes?     Do you have any of these symptoms? (Select all that apply)  Numbness in feet-Right Foot     BP Readings from Last 2 Encounters:   11/06/20 132/72   10/08/20 110/75     Hemoglobin A1C (%)   Date Value   01/26/2021 7.3 (H)   07/29/2020 6.8 (H)     LDL Cholesterol Calculated (mg/dL)   Date Value   07/23/2020 31   12/26/2019 74                Most recent lab results reviewed with pt.       AM sugars 129-135 average   Lunch 154 average   Supper 179    Using Levemir 35 units daily    Lab Results   Component Value Date    GLC 70 01/26/2021     Lab Results   Component Value Date    A1C 7.3 01/26/2021     Lab Results   Component Value Date    CHOL 127 07/23/2020     Lab Results   Component Value Date    LDL 31 07/23/2020     Lab Results   Component Value Date    HDL 68 07/23/2020     Lab Results   Component Value Date    TRIG 141 07/23/2020     Lab Results   Component Value Date    CR 0.95 01/26/2021     Lab Results   Component Value Date    ALT 19 07/23/2020     Lab Results   Component Value Date    AST 10 07/23/2020     Lab Results   Component Value Date    MICROL 5 07/23/2020     Lab Results   Component Value Date    TSH 3.14 12/26/2019     Denies CP, SOB, abdominal pain, polyuria, polydipsia, vision changes, extremity numbness/parasthesias or skin problems.  Folloowing DM diet   Has snoring at night and some apnea has been seen by family. Has daytime fatigue  Pt had previously lost 15 pounds in weight with diet over past 2 years but has now gained it back during covid pandemic. Has  "not been exercising  Has some bilateral numbness in hands. Generally in median nerve distribution but  Rarely in 5th fingers and in forearms. Has been using wrist braces at night with some benefit. Denies neck pains         Additional ROS:   Constitutional, HEENT, Cardiovascular, Pulmonary, GI and , Neuro, MSK and Psych review of systems/symptoms are otherwise negative or unchanged from previous, except as noted above.      OBJECTIVE:  /70   Pulse 71   Temp 98.5  F (36.9  C) (Temporal)   Resp 16   Ht 1.575 m (5' 2\")   Wt 88 kg (194 lb)   SpO2 97%   BMI 35.48 kg/m     Estimated body mass index is 35.48 kg/m  as calculated from the following:    Height as of this encounter: 1.575 m (5' 2\").    Weight as of this encounter: 88 kg (194 lb).  Eye: PERRL, EOMI  HENT: ear canals and TM's normal and nose and mouth without ulcers or lesions. Nasal mucosa edematous. Sinuses NT.  Narrowed posterior pharyngeal airway due to obesity   Neck: no adenopathy. Thyroid normal to palpation. No bruits. Nontender to ROM  Pulm: Lungs clear to auscultation   CV: Regular rates and rhythm  GI: Soft, obese, nontender, Normal active bowel sounds, No hepatosplenomegaly or masses palpable  Ext: Peripheral pulses intact. No edema.  Neuro: Normal strength and tone, sensory exam grossly normal except for slight reduced LTS in median nerve distribution of hands bilaterally (right worse than left). Equivocal Tinel's and Phalen's bilaterally          "

## 2021-02-05 NOTE — PATIENT INSTRUCTIONS
Referral to  Sleep clinic re: probable  sleep apnea. Call [phone] for  appt    Fluticasone/Flonase 1-2 spray each nostril  daily at bedtime   EMG  Bilateral upper  extremity  at [facility]. They will call to schedule  Continue same medications   Fasting labs in 4 months (blood and urine). See me a few days later  Reduce calorie/carbohydrate (sugar, bread, potato, pasta, rice, alcohol etc)  intake in diet for weight loss.  Increase color on your plate with fruits and vegetables. Increase  frequency of walking or other aerobic exercise as able

## 2021-02-08 ENCOUNTER — TELEPHONE (OUTPATIENT)
Dept: INTERNAL MEDICINE | Facility: CLINIC | Age: 76
End: 2021-02-08

## 2021-02-08 NOTE — TELEPHONE ENCOUNTER
----- Message from Luigi Driscoll MD sent at 2/7/2021 11:59 PM CST -----  Regarding: EMG order   Please print the EMG order  from  visit 2/4/21 and fax it to [facility] and ask them to call pt's daughter  at [phone]  CELL PHONE IS IAM CabanKaplanyuko GREEN, DAUGHTER

## 2021-02-16 NOTE — PROGRESS NOTES
Essie is a 75 year old who is being evaluated via a billable video visit.      How would you like to obtain your AVS? Mail a copy  If the video visit is dropped, the invitation should be resent by: Text to cell phone: [phone]  Will anyone else be joining your video visit? DAUGHTER: AMY Jordan MA    Video Start Time: 3:07PM      [facility]   Outpatient Sleep Medicine Consultation  February 18, 2021      Name: Essie Huddleston MRN# [id number]   Age: 75 year old YOB: 1945     Date of Consultation: February 18, 2021  Consultation is requested by: Luigi Driscoll MD  [address] [place] [id number] Luigi Driscoll  Primary care provider: Luigi Driscoll         Assessment and Plan:   1. Suspected sleep apnea  2. Snoring  3. Gasping for breath in sleep  4. Morbid obesity (H)  5. Essential hypertension with goal blood pressure less than 130/80    Patient is being evaluated for Obstructive Sleep Apnea (KIM).  Patient's risk factors for KIM include: snoring, gasp/choking arousals, obesity (BMI 35.48), high blood pressure, age >50, and post-menopausal status. We discussed pathophysiology of KIM and patient is interested in treatment and willing to undergo overnight sleep testing. An in lab polysomnogram was ordered today.   - Comprehensive Sleep Study; Future    Briefly discussed potential treatment modalities in the event sleep apnea is identified on testing (CPAP mentioned only) and additional information given in patient instructions. Will plan to discuss in more detail at next visit pending study results.     Follow up 1-2 weeks following her study for review of results and to expedite care. Educational materials provided in instructions.         Chief Complaint / Reason for Sleep Consult:     Chief Complaint   Patient presents with     Consult     Snoring, waking up gasping          History of Present Illness:     Essie Huddleston is a 75 year old female who presents to the  "clinic today with her daughter Haydee for evaluation of snoring, gasp arousals, and suspected sleep apnea. 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    Patient presents with a 40+ year history of snoring and 20+ year history of gasp arousals. No witnessed apneas but sleeps alone (other than grandchildren in same room but they are too young to notice irregular breathing). Grandchildren do complain of her loud snoring. Reports gasp arousals and waking up choking. Sleeps on sides. Occasional nocturnal GERD. Denies morning headaches. Occasional morning dry mouth. Denies nasal/sinus congestion.    On weekdays, goes to bed at 12:00AM and wakes at 7:00AM. On weekends, goes to bed between 11:00PM-12:00AM and wakes at 8:30AM. Falls asleep \"right away\". Wakes 1 time per night to use restroom or from apnea episodes, returns to sleep within 30 minutes. Does read and use phone in bed.     Naps once per week for 30 minutes, does not feel refreshed after napping. States she will nap if alone and bored but doesn't feel the need to nap during the say. She had a total [place] Sleepiness Scale of 2 (02/16/21 0948), with scores of 10 or higher indicating abnormal levels of sleepiness.    Patient denies typical restless legs syndrome symptoms but does have neuropathy symptoms at night sometimes. No known kicking/leg movements. Does report somniloquy. No dream enactment behavior.  No somnambulism.  No sleep related eating. Denies bruxism.     SCALES       SLEEP APNEA: Stopbang score  4/8       INSOMNIA:  Insomnia severity score: 16/28       SLEEPINESS: [place] sleepiness scale: 2 when asked by rooming staff, 5 on new patient paperwork [normal < 11]          Medications:     Current Outpatient Medications   Medication Sig     amLODIPine (NORVASC) 5 MG tablet Take 1 tablet by mouth once daily     aspirin 81 MG " tablet Take 1 tablet (81 mg) by mouth daily     bisoprolol (ZEBETA) 5 MG tablet 1/2-1 tab daily     blood glucose (NO BRAND SPECIFIED) test strip Use to test blood sugar 3 times daily or as directed.     blood glucose (NO BRAND SPECIFIED) test strip Use to test blood sugar 2 times daily or as directed.     blood glucose calibration (NO BRAND SPECIFIED) solution Use to calibrate blood glucose monitor as needed as directed.     blood glucose monitoring (NO BRAND SPECIFIED) meter device kit Use to test blood sugar 3 times daily or as directed.     blood glucose monitoring (NO BRAND SPECIFIED) meter device kit Use to test blood sugar 2 times daily or as directed.     fluticasone (FLONASE) 50 MCG/ACT nasal spray Spray 2 sprays into both nostrils At Bedtime     gabapentin (NEURONTIN) 300 MG capsule TAKE 1 CAPSULE BY MOUTH THREE TIMES DAILY     glipiZIDE (GLUCOTROL) 5 MG tablet Take 1 tablet (5 mg) by mouth 2 times daily (before meals)     hydrOXYzine (VISTARIL) 25 MG capsule TAKE 1 CAPSULE BY MOUTH THREE TIMES DAILY AS NEEDED FOR ITCHING     insulin detemir (LEVEMIR FLEXTOUCH) 100 UNIT/ML pen 34 units injected daily in PM     insulin pen needle (RELION MINI PEN NEEDLES) 31G X 6 MM miscellaneous USE 1 PEN NEEDLE DAILY OR AS DIRECTED     losartan (COZAAR) 100 MG tablet Take 1 tablet (100 mg) by mouth daily     metFORMIN (GLUCOPHAGE) 1000 MG tablet TAKE 1 TABLET BY MOUTH TWICE DAILY WITH MEALS     simvastatin (ZOCOR) 20 MG tablet TAKE 1 TABLET BY MOUTH AT BEDTIME     thin (NO BRAND SPECIFIED) lancets Use to test blood sugar 2 times daily or as directed.     traMADol (ULTRAM) 50 MG tablet Take 1 tablet by mouth twice daily     triamcinolone (KENALOG) 0.1 % external cream Apply topically 3 times daily     valACYclovir (VALTREX) 1000 mg tablet TAKE 1 TABLET BY MOUTH THREE TIMES DAILY FOR 7 DAYS     No current facility-administered medications for this visit.              Allergies:     Allergies   Allergen Reactions      "Metoprolol      cough            Past Medical History:     Past Medical History:   Diagnosis Date     Chronic right-sided low back pain with right-sided sciatica 2019     Essential hypertension with goal blood pressure less than 130/80 2019     Hyperlipidemia LDL goal <100 2012     Type 2 diabetes mellitus with diabetic nephropathy  (goal A1C<7) 10/24/2015     Vitamin D deficiency 2012             Past Surgical History:    Previous upper airway surgery: denies   No past surgical history on file.         Social History:     Social History     Tobacco Use     Smoking status: Never Smoker     Smokeless tobacco: Never Used   Substance Use Topics     Alcohol use: Yes     Comment: seldom 1 glass wine.     Chemical History:  Alcohol use: Denies       Tobacco use: Denies  Illicit substances: Denies  Caffeine intake: Drinks 3 cups of coffee/black tea per day.          Family History:     Family History   Problem Relation Age of Onset     Diabetes Mother      Cancer Father         prostate     Asthma Son       Sleep Family Hx: Mother (now ) and sister snore. Son has KIM. No known family history of restless legs syndrome, narcolepsy or other sleep disorders.          Review of Systems:   A complete 10 point review of systems was negative other than HPI or as commented below:   Blurred vision, ear pain, new lumps/bump on right middle finger, slight swelling in feet/legs, short of breath when lying flat, shortness of breath with activity, short of breath at night (apneas), wheezing, headaches, weakness and numbness in arms/legs, urinary urgency, muscle pain, joint/bone pain, swollen joints, depression, anxiety          Physical Examination:   There were no vitals taken for this visit.  Vitals - Patient Reported 2021   Height (Patient Reported) 5' 2\"   Weight (Patient Reported) 194 lb   BMI (Based on Pt Reported Ht/Wt) 35.48 kg/m2   General: Cooperative and pleasant. In no apparent " distress.  Pulmonary:  Able to speak easily in full sentences. No cough or wheeze.   Neurologic: Alert, oriented x3.   Psychiatric: Mood euthymic. Affect congruent with full range and intensity.         Data: All pertinent previous laboratory data reviewed     No results found for: PH, PHARTERIAL, PO2, [id number], SAT, PCO2, HCO3, BASEEXCESS, CANDY, BEB  Lab Results   Component Value Date    TSH 3.14 12/26/2019    TSH 2.18 12/01/2014     Lab Results   Component Value Date    GLC 70 01/26/2021    GLC 87 07/23/2020     Lab Results   Component Value Date    HGB 12.1 12/01/2014     Lab Results   Component Value Date    BUN 22 01/26/2021    BUN 9 07/23/2020    CR 0.95 01/26/2021    CR 0.63 07/23/2020     Lab Results   Component Value Date    AST 10 07/23/2020    AST 12 12/26/2019    ALT 19 07/23/2020    ALT 24 12/26/2019    ALKPHOS 63 07/23/2020    ALKPHOS 64 12/26/2019    BILITOTAL 0.3 07/23/2020    BILITOTAL 0.3 12/26/2019    BILICONJ 0.0 07/27/2012     No results found for: UAMP, UBARB, BENZODIAZEUR, UCANN, UCOC, OPIT, UPCP    Copy to: Luigi Driscoll PA-C  Feb 18, 2021     [facility] [place]  [address] [place], [place] [id number] [place] [address]  [address] [address]    Chart documentation was completed, in part, with [employer] voice-recognition software. Even though reviewed, some grammatical, spelling, and word errors may remain.    Video-Visit Details    Type of service:  Video Visit    Video End Time:3:26PM    Originating Location (pt. Location): Home    Distant Location (provider location):  [facility]     Platform used for Video Visit: Mary     Of note, after 20 minutes of failure for patient to connect successfully to video visit despite mine and her daughters coaching (her daughter was able to connect successfully but patient unable to log on successfully, they were not at same location) the  visit was transitioned to telephone only.     Phone call start time: 3:26PM  Phone call end time: 3:49PM  Phone call duration: 22:37 minutes     63 minutes spent on day of encounter doing chart review, history and exam, documentation, and further activities as noted above

## 2021-02-17 DIAGNOSIS — F11.90 CHRONIC, CONTINUOUS USE OF OPIOIDS: ICD-10-CM

## 2021-02-17 DIAGNOSIS — M54.41 CHRONIC RIGHT-SIDED LOW BACK PAIN WITH RIGHT-SIDED SCIATICA: ICD-10-CM

## 2021-02-17 DIAGNOSIS — G89.29 CHRONIC RIGHT-SIDED LOW BACK PAIN WITH RIGHT-SIDED SCIATICA: ICD-10-CM

## 2021-02-18 ENCOUNTER — VIRTUAL VISIT (OUTPATIENT)
Dept: SLEEP MEDICINE | Facility: CLINIC | Age: 76
End: 2021-02-18
Attending: INTERNAL MEDICINE
Payer: COMMERCIAL

## 2021-02-18 DIAGNOSIS — I10 ESSENTIAL HYPERTENSION WITH GOAL BLOOD PRESSURE LESS THAN 130/80: ICD-10-CM

## 2021-02-18 DIAGNOSIS — R29.818 SUSPECTED SLEEP APNEA: Primary | ICD-10-CM

## 2021-02-18 DIAGNOSIS — E66.01 MORBID OBESITY (H): ICD-10-CM

## 2021-02-18 DIAGNOSIS — R06.89 GASPING FOR BREATH: ICD-10-CM

## 2021-02-18 DIAGNOSIS — R06.83 SNORING: ICD-10-CM

## 2021-02-18 PROCEDURE — 99205 OFFICE O/P NEW HI 60 MIN: CPT | Mod: 95 | Performed by: PHYSICIAN ASSISTANT

## 2021-02-18 RX ORDER — TRAMADOL HYDROCHLORIDE 50 MG/1
TABLET ORAL
Qty: 60 TABLET | Refills: 2 | Status: SHIPPED | OUTPATIENT
Start: 2021-02-18 | End: 2021-05-19

## 2021-02-18 NOTE — PATIENT INSTRUCTIONS
"MY TREATMENT INFORMATION FOR SLEEP APNEA-  Essie Huddleston    DOCTOR : Alyssa Aburto PA-C    Am I having a sleep study at a sleep center?  --->Due to insurance clearance delays, you will be contacted within 2-4 weeks to schedule    Frequently asked questions:  1. What is Obstructive Sleep Apnea (KIM)? KIM is the most common type of sleep apnea. Apnea means, \"without breath.\"  Apnea is most often caused by narrowing or collapse of the upper airway as muscles relax during sleep.   Almost everyone has occasional apneas. Most people with sleep apnea have had brief interruptions at night frequently for many years.  The severity of sleep apnea is related to how frequent and severe the events are.   2. What are the consequences of KIM? Symptoms include: feeling sleepy during the day, snoring loudly, gasping or stopping of breathing, trouble sleeping, and occasionally morning headaches or heartburn at night.  Sleepiness can be serious and even increase the risk of falling asleep while driving. Other health consequences may include development of high blood pressure and other cardiovascular disease in persons who are susceptible. Untreated KIM  can contribute to heart disease, stroke and diabetes.   3. What are the treatment options? In most situations, sleep apnea is a lifelong disease that must be managed with daily therapy. Medications are not effective for sleep apnea and surgery is generally not considered until other therapies have been tried. Your treatment is your choice . Continuous Positive Airway (CPAP) works right away and is the therapy that is effective in nearly everyone. An oral device to hold your jaw forward is usually the next most reliable option. Other options include postioning devices (to keep you off your back), weight loss, and surgery including a tongue pacing device. There is more detail about some of these options below.  4. Are my sleep studies covered by insurance? Although we will request " verification of coverage, we advise you also check in advance of the study to ensure there is coverage.    Important tips for those choosing CPAP and similar devices   Know your equipment:  CPAP is continuous positive airway pressure that prevents obstructive sleep apnea by keeping the throat from collapsing while you are sleeping. In most cases, the device is  smart  and can slowly self-adjusts if your throat collapses and keeps a record every day of how well you are treated-this information is available to you and your care team.  BPAP is bilevel positive airway pressure that keeps your throat open and also assists each breath with a pressure boost to maintain adequate breathing.  Special kinds of BPAP are used in patients who have inadequate breathing from lung or heart disease. In most cases, the device is  smart  and can slowly self-adjusts to assist breathing. Like CPAP, the device keeps a record of how well you are treated.  Your mask is your connection to the device. You get to choose what feels most comfortable and the staff will help to make sure if fits. Here: are some examples of the different masks that are available:       Key points to remember on your journey with sleep apnea:  1. Sleep study.  PAP devices often need to be adjusted during a sleep study to show that they are effective and adjusted right.  2. Good tips to remember: Try wearing just the mask during a quiet time during the day so your body adapts to wearing it. A humidifier is recommended for comfort in most cases to prevent drying of your nose and throat. Allergy medication from your provider may help you if you are having nasal congestion.  3. Getting settled-in. It takes more than one night for most of us to get used to wearing a mask. Try wearing just the mask during a quiet time during the day so your body adapts to wearing it. A humidifier is recommended for comfort in most cases. Our team will work with you carefully on the first  day and will be in contact within 4 days and again at 2 and 4 weeks for advice and remote device adjustments. Your therapy is evaluated by the device each day.   4. Use it every night. The more you are able to sleep naturally for 7-8 hours, the more likely you will have good sleep and to prevent health risks or symptoms from sleep apnea. Even if you use it 4 hours it helps. Occasionally all of us are unable to use a medical therapy, in sleep apnea, it is not dangerous to miss one night.   5. Communicate. Call our skilled team on the number provided on the first day if your visit for problems that make it difficult to wear the device. Over 2 out of 3 patients can learn to wear the device long-term with help from our team. Remember to call our team or your sleep providers if you are unable to wear the device as we may have other solutions for those who cannot adapt to mask CPAP therapy. It is recommended that you sleep your sleep provider within the first 3 months and yearly after that if you are not having problems.   6. Use it for your health. We encourage use of CPAP masks during daytime quiet periods to allow your face and brain to adapt to the sensation of CPAP so that it will be a more natural sensation to awaken to at night or during naps. This can be very useful during the first few weeks or months of adapting to CPAP though it does not help medically to wear CPAP during wakefulness and  should not be used as a strategy just to meet guidelines.  7. Take care of your equipment. Make sure you clean your mask and tubing using directions every day and that your filter and mask are replaced as recommended or if they are not working.     BESIDES CPAP, WHAT OTHER THERAPIES ARE THERE?    Positioning Device  Positioning devices are generally used when sleep apnea is mild and only occurs on your back.This example shows a pillow that straps around the waist. It may be appropriate for those whose sleep study shows milder  sleep apnea that occurs primarily when lying flat on one's back. Preliminary studies have shown benefit but effectiveness at home may need to be verified by a home sleep test. These devices are generally not covered by medical insurance.  Examples of devices that maintain sleeping on the back to prevent snoring and mild sleep apnea.    Belt type body positioner  [email or web address]    Electronic reminder  http://nightshifttherapy.[email or web address]  [email or web address]      Oral Appliance  What is oral appliance therapy?  An oral appliance device fits on your teeth at night like a retainer used after having braces. The device is made by a specialized dentist and requires several visits over 1-2 months before a manufactured device is made to fit your teeth and is adjusted to prevent your sleep apnea. Once an oral device is working properly, snoring should be improved. A home sleep test may be recommended at that time if to determine whether the sleep apnea is adequately treated.       Some things to remember:  -Oral devices are often, but not always, covered by your medical insurance. Be sure to check with your insurance provider.   -If you are referred for oral therapy, you will be given a list of specialized dentists to consider or you may choose to visit the Web site of the American Academy of Dental Sleep Medicine  -Oral devices are less likely to work if you have severe sleep apnea or are extremely overweight.     More detailed information  An oral appliance is a small acrylic device that fits over the upper and lower teeth  (similar to a retainer or a mouth guard). This device slightly moves jaw forward, which moves the base of the tongue forward, opens the airway, improves breathing for effective treat snoring and obstructive sleep apnea in perhaps 7 out of 10 people .  The best working devices are custom-made by a dental device  after a mold is made of the teeth 1, 2, 3.  When is an oral appliance  indicated?  Oral appliance therapy is recommended as a first-line treatment for patients with primary snoring, mild sleep apnea, and for patients with moderate sleep apnea who prefer appliance therapy to use of CPAP4, 5. Severity of sleep apnea is determined by sleep testing and is based on the number of respiratory events per hour of sleep.   How successful is oral appliance therapy?  The success rate of oral appliance therapy in patients with mild sleep apnea is 75-80% while in patients with moderate sleep apnea it is 50-70%. The chance of success in patients with severe sleep apnea is 40-50%. The research also shows that oral appliances have a beneficial effect on the cardiovascular health of KIM patients at the same magnitude as CPAP therapy7.  Oral appliances should be a second-line treatment in cases of severe sleep apnea, but if not completely successful then a combination therapy utilizing CPAP plus oral appliance therapy may be effective. Oral appliances tend to be effective in a broad range of patients although studies show that the patients who have the highest success are females, younger patients, those with milder disease, and less severe obesity. 3, 6.   Finding a dentist that practices dental sleep medicine  Specific training is available through the American Academy of Dental Sleep Medicine for dentists interested in working in the field of sleep. To find a dentist who is educated in the field of sleep and the use of oral appliances, near you, visit the Web site of the American Academy of Dental Sleep Medicine.    References  1. Charles et al. Objectively measured vs self-reported compliance during oral appliance therapy for sleep-disordered breathing. Chest 2013; 144(5): 8907-5693.  2. Anita, et al. Objective measurement of compliance during oral appliance therapy for sleep-disordered breathing. Thorax 2013; 68(1): 91-96.  3. Antonio et al. Mandibular advancement devices in 620 men and  women with KIM and snoring: tolerability and predictors of treatment success. Chest 2004; 125: 3185-4361.  4. Heather et al. Oral appliances for snoring and KIM: a review. Sleep 2006; 29: 244-262.  5. Fadia et al. Oral appliance treatment for KIM: an update. J Clin Sleep Med 2014; 10(2): 215-227.  6. Sky et al. Predictors of OSAH treatment outcome. J Dent Res 2007; 86: 2022-7449.      Weight Loss:    Weight loss is a long-term strategy that may improve sleep apnea in some patients.    Weight management is a personal decision and the decision should be based on your interest and the potential benefits.  If you are interested in exploring weight loss strategies, the following discussion covers the impact on weight loss on sleep apnea and the approaches that may be successful.    Being overweight does not necessarily mean you will have health consequences.  Those who have BMI over 35 or over 27 with existing medical conditions carries greater risk.   Weight loss decreases severity of sleep apnea in most people with obesity. For those with mild obesity who have developed snoring with weight gain, even 15-30 pound weight loss can improve and occasionally eliminate sleep apnea.  Structured and life-long dietary and health habits are necessary to lose weight and keep healthier weight levels.     Though there may be significant health benefits from weight loss, long-term weight loss is very difficult to achieve- studies show success with dietary management in less than 10% of people. In addition, substantial weight loss may require years of dietary control and may be difficult if patients have severe obesity. In these cases, surgical management may be considered.  Finally, older individuals who have tolerated obesity without health complications may be less likely to benefit from weight loss strategies.        Your BMI is There is no height or weight on file to calculate BMI.  Weight management is a personal  decision.  If you are interested in exploring weight loss strategies, the following discussion covers the approaches that may be successful. Body mass index (BMI) is one way to tell whether you are at a healthy weight, overweight, or obese. It measures your weight in relation to your height.  A BMI of 18.5 to 24.9 is in the healthy range. A person with a BMI of 25 to 29.9 is considered overweight, and someone with a BMI of 30 or greater is considered obese. More than two-thirds of American adults are considered overweight or obese.  Being overweight or obese increases the risk for further weight gain. Excess weight may lead to heart disease and diabetes.  Creating and following plans for healthy eating and physical activity may help you improve your health.  Weight control is part of healthy lifestyle and includes exercise, emotional health, and healthy eating habits. Careful eating habits lifelong are the mainstay of weight control. Though there are significant health benefits from weight loss, long-term weight loss with diet alone may be very difficult to achieve- studies show long-term success with dietary management in less than 10% of people. Attaining a healthy weight may be especially difficult to achieve in those with severe obesity. In some cases, medications, devices and surgical management might be considered.  What can you do?  If you are overweight or obese and are interested in methods for weight loss, you should discuss this with your provider.     Consider reducing daily calorie intake by 500 calories.     Keep a food journal.     Avoiding skipping meals, consider cutting portions instead.    Diet combined with exercise helps maintain muscle while optimizing fat loss. Strength training is particularly important for building and maintaining muscle mass. Exercise helps reduce stress, increase energy, and improves fitness. Increasing exercise without diet control, however, may not burn enough calories  to loose weight.       Start walking three days a week 10-20 minutes at a time    Work towards walking thirty minutes five days a week     Eventually, increase the speed of your walking for 1-2 minutes at time    In addition, we recommend that you review healthy lifestyles and methods for weight loss available through the National Institutes of Health patient information sites:  http://win.niddk.nih.gov/publications/index.htm    And look into health and wellness programs that may be available through your health insurance provider, employer, local community center, or [employer].    Weight management plan: Patient was referred to their PCP to discuss a diet and exercise plan.      Surgery:    Surgery for obstructive sleep apnea is considered generally only when other therapies fail to work. Surgery may be discussed with you if you are having a difficult time tolerating CPAP and or when there is an abnormal structure that requires surgical correction.  Nose and throat surgeries often enlarge the airway to prevent collapse.  Most of these surgeries create pain for 1-2 weeks and up to half of the most common surgeries are not effective throughout life.  You should carefully discuss the benefits and drawbacks to surgery with your sleep provider and surgeon to determine if it is the best solution for you.   More information  Surgery for KIM is directed at areas that are responsible for narrowing or complete obstruction of the airway during sleep.  There are a wide range of procedures available to enlarge and/or stabilize the airway to prevent blockage of breathing in the three major areas where it can occur: the palate, tongue, and nasal regions.  Successful surgical treatment depends on the accurate identification of the factors responsible for obstructive sleep apnea in each person.  A personalized approach is required because there is no single treatment that works well for everyone.  Because of anatomic variation,  consultation with an examination by a sleep surgeon is a critical first step in determining what surgical options are best for each patient.  In some cases, examination during sedation may be recommended in order to guide the selection of procedures.  Patients will be counseled about risks and benefits as well as the typical recovery course after surgery. Surgery is typically not a cure for a person s KIM.  However, surgery will often significantly improve one s KIM severity (termed  success rate ).  Even in the absence of a cure, surgery will decrease the cardiovascular risk associated with OSA7; improve overall quality of life8 (sleepiness, functionality, sleep quality, etc).      Palate Procedures:  Patients with KIM often have narrowing of their airway in the region of their tonsils and uvula.  The goals of palate procedures are to widen the airway in this region as well as to help the tissues resist collapse.  Modern palate procedure techniques focus on tissue conservation and soft tissue rearrangement, rather than tissue removal.  Often the uvula is preserved in this procedure. Residual sleep apnea is common in patient after pharyngoplasty with an average reduction in sleep apnea events of 33%2.      Tongue Procedures:  ExamWhile patients are awake, the muscles that surround the throat are active and keep this region open for breathing. These muscles relax during sleep, allowing the tongue and other structures to collapse and block breathing.  There are several different tongue procedures available.  Selection of a tongue base procedure depends on characteristics seen on physical exam.  Generally, procedures are aimed at removing bulky tissues in this area or preventing the back of the tongue from falling back during sleep.  Success rates for tongue surgery range from 50-62%3.    Hypoglossal Nerve Stimulation:  Hypoglossal nerve stimulation has recently received approval from the [place] Food and Drug  Administration for the treatment of obstructive sleep apnea.  This is based on research showing that the system was safe and effective in treating sleep apnea6.  Results showed that the median AHI score decreased 68%, from 29.3 to 9.0. This therapy uses an implant system that senses breathing patterns and delivers mild stimulation to airway muscles, which keeps the airway open during sleep.  The system consists of three fully implanted components: a small generator (similar in size to a pacemaker), a breathing sensor, and a stimulation lead.  Using a small handheld remote, a patient turns the therapy on before bed and off upon awakening.    Candidates for this device must be greater than 22 years of age, have moderate to severe KIM (AHI between 20-65), BMI less than 32, have tried CPAP/oral appliance without success, and have appropriate upper airway anatomy (determined by a sleep endoscopy performed by Dr. Hugo).    Hypoglossal Nerve Stimulation Pathway:    The sleep surgeon s office will work with the patient through the insurance prior-authorization process (including communications and appeals).    Nasal Procedures:  Nasal obstruction can interfere with nasal breathing during the day and night.  Studies have shown that relief of nasal obstruction can improve the ability of some patients to tolerate positive airway pressure therapy for obstructive sleep apnea1.  Treatment options include medications such as nasal saline, topical corticosteroid and antihistamine sprays, and oral medications such as antihistamines or decongestants. Non-surgical treatments can include external nasal dilators for selected patients. If these are not successful by themselves, surgery can improve the nasal airway either alone or in combination with these other options.      Combination Procedures:  Combination of surgical procedures and other treatments may be recommended, particularly if patients have more than one area of narrowing or  persistent positional disease.  The success rate of combination surgery ranges from 66-80%2,3.    References  1. Oma BEEBE. The Role of the Nose in Snoring and Obstructive Sleep Apnoea: An Update.  Eur Arch Otorhinolaryngol. 2011; 268: 1365-73.  2.  Eliud SM; Rory JA; Jesus JR; Pallanch JF; Lisa MB; Poly SG; Marnie OLGUIN. Surgical modifications of the upper airway for obstructive sleep apnea in adults: a systematic review and meta-analysis. SLEEP 2010;33(10):8301-6178. Radha COLIN. Hypopharyngeal surgery in obstructive sleep apnea: an evidence-based medicine review.  Arch Otolaryngol Head Neck Surg. 2006 Feb;132(2):206-13.  3. Sebastian YH1, Christina Y, Chad DARCIE. The efficacy of anatomically based multilevel surgery for obstructive sleep apnea. Otolaryngol Head Neck Surg. 2003 Oct;129(4):327-35.  4. Radha COLIN, Goldberg A. Hypopharyngeal Surgery in Obstructive Sleep Apnea: An Evidence-Based Medicine Review. Arch Otolaryngol Head Neck Surg. 2006 Feb;132(2):206-13.  5. Jakubo PJ et al. Upper-Airway Stimulation for Obstructive Sleep Apnea.  N Engl J Med. 2014 Jan 9;370(2):139-49.  6. Angelia Y et al. Increased Incidence of Cardiovascular Disease in Middle-aged Men with Obstructive Sleep Apnea. Am J Respir Crit Care Med; 2002 166: 159-165  7. Miley EM et al. Studying Life Effects and Effectiveness of Palatopharyngoplasty (SLEEP) study: Subjective Outcomes of Isolated Uvulopalatopharyngoplasty. Otolaryngol Head Neck Surg. 2011; 144: 623-631.

## 2021-02-18 NOTE — PROGRESS NOTES
"Essie is a 75 year old who is being evaluated via a billable video visit.      How would you like to obtain your AVS? Mail a copy  If the video visit is dropped, the invitation should be resent by: Text to cell phone: [phone]  Will anyone else be joining your video visit? Yes: Daughter IAM at another location. How would they like to receive their invitation? Send to e-mail at: [email or web address]  {If patient encounters technical issues they should call [phone] :[id number]}    Video Start Time: {video visit start/end time for provider to select:753719}  Video-Visit Details    Type of service:  Video Visit    Video End Time:{video visit start/end time for provider to select:520783}    Originating Location (pt. Location): {video visit patient location:860408::\"Home\"}    Distant Location (provider location):  [facility]     Platform used for Video Visit: {Virtual Visit Platforms:285885::\"Well\"}    "

## 2021-02-24 ENCOUNTER — THERAPY VISIT (OUTPATIENT)
Dept: SLEEP MEDICINE | Facility: CLINIC | Age: 76
End: 2021-02-24
Payer: COMMERCIAL

## 2021-02-24 DIAGNOSIS — E66.01 MORBID OBESITY (H): ICD-10-CM

## 2021-02-24 DIAGNOSIS — I10 ESSENTIAL HYPERTENSION WITH GOAL BLOOD PRESSURE LESS THAN 130/80: ICD-10-CM

## 2021-02-24 DIAGNOSIS — R29.818 SUSPECTED SLEEP APNEA: ICD-10-CM

## 2021-02-24 DIAGNOSIS — R06.83 SNORING: ICD-10-CM

## 2021-02-24 DIAGNOSIS — R06.89 GASPING FOR BREATH: ICD-10-CM

## 2021-02-24 PROCEDURE — 95810 POLYSOM 6/> YRS 4/> PARAM: CPT | Performed by: INTERNAL MEDICINE

## 2021-03-01 LAB — SLPCOMP: NORMAL

## 2021-03-10 ENCOUNTER — VIRTUAL VISIT (OUTPATIENT)
Dept: SLEEP MEDICINE | Facility: CLINIC | Age: 76
End: 2021-03-10
Payer: COMMERCIAL

## 2021-03-10 VITALS — BODY MASS INDEX: 34.96 KG/M2 | HEIGHT: 62 IN | WEIGHT: 190 LBS

## 2021-03-10 DIAGNOSIS — G47.33 OSA (OBSTRUCTIVE SLEEP APNEA): Primary | ICD-10-CM

## 2021-03-10 PROCEDURE — 99215 OFFICE O/P EST HI 40 MIN: CPT | Mod: 95 | Performed by: PHYSICIAN ASSISTANT

## 2021-03-10 ASSESSMENT — MIFFLIN-ST. JEOR: SCORE: 1315.08

## 2021-03-10 NOTE — PROGRESS NOTES
Essie is a 75 year old who is being evaluated via a billable video visit.      How would you like to obtain your AVS? Mail a copy  If the video visit is dropped, the invitation should be resent by: Text to cell phone: [phone] - [place]  Will anyone else be joining your video visit? No      Lamar Chatterjee MA on 3/10/2021 at 11:07 AM    Video Start Time: 12:31PM     [facility]   Outpatient Sleep Medicine  March 10, 2021      Name: Essie Huddleston MRN# [id number]   Age: 75 year old  YOB: 1945            Assessment and Plan:   1. KIM (obstructive sleep apnea)    During this visit, we reviewed the summary of the study including stage, position, event distribution, oximetry and heart rate. Severe obstructive sleep apnea with hypoxemia was identified. Sleep architecture revealed severe sleep disruption attributable to respiratory events with absence of stage N3 and prolonged wakefulness. No abnormal sleep movements. No abnormalities noted on cardiac monitoring.     Discussed that the most immediately effective therapy for severe obstructive sleep apnea with sleep disruption and hypoxemia is auto-tiration CPAP with enrollment in [facility] coordinated care. Mandibular advancement device and surgical options were also discussed.     After discussion of the above, patient has elected to start PAP therapy. A prescription for autoCPAP [id number] was sent to [facility] today. Will be enrolled in [facility].   - Comprehensive DME    She will be seen back approximately 2 months after starting PAP therapy to ensure compliance and review treatment outcomes.  However, if she develops any difficulties with treatment she is instructed to contact us or DME company immediately to troubleshoot problems.         Chief Complaint      Chief Complaint   Patient presents with     Study Results            History of Present Illness:   Essie Huddleston is a 75 year old female who presents to the clinic with her  daughter (Haydee) for results of recent sleep study completed on 2/24/21. Relevant medical history includes hypertension and diabetes with nephropathy. A diagnostic polysomnogram was performed to evaluate for sleep apnea. Reports she slept poorly the night of her sleep study due to combination of being in a new place and monitoring discomfort.    Reviewed results of sleep study with patient and her daughter as follows:  Sleep Architecture: Severe sleep disruption attributable to respiratory events with absence of stage N3 and prolonged wakefulness.   The total recording time of the polysomnogram was 462.5 minutes. The total sleep time was 307.5 minutes. Sleep latency was normal at 10.0 minutes without the use of a sleep aid. REM latency was 32.5 minutes. Arousal index was increased at 64.6 arousals per hour. Sleep efficiency was decreased at 66.5%. Wake after sleep onset was 139.0 minutes. The patient spent 33.0% of total sleep time in Stage N1, 42.9% in Stage N2, 0.0% in Stage N3, and 24.1% in REM. Time in REM supine was 15.0 minutes.     Respiration: Severe obstructive sleep apnea with hypoxemia.     Events ? The polysomnogram revealed a presence of 23 obstructive, 27 central, and 5 mixed apneas resulting in an apnea index of 10.7 events per hour. There were 118 obstructive hypopneas and - central hypopneas resulting in an obstructive hypopnea index of 23.0 and central hypopnea index of - events per hour. The combined apnea/hypopnea index was 33.8 events per hour (central apnea/hypopnea index was 5.3 events per hour). The REM AHI was 49.5 events per hour. The supine AHI was 53.0 events per hour. The RERA index was 20.9 events per hour. The RDI was 54.6 events per hour.     Snoring - was reported as moderate/ intermittent.     Respiratory rate and pattern - was notable for normal respiratory rate and pattern.     Sustained Sleep Associated Hypoventilation - Transcutaneous carbon dioxide monitoring was not used.      "Sleep Associated Hypoxemia - (Greater than 5 minutes O2 sat at or below 88%) was present. Baseline oxygen saturation was 94.3%. Lowest oxygen saturation was 72.4%. Time spent less than or equal to 88% was 8.4 minutes. Time spent less than or equal to 89% was 11.7 minutes.     Movement Activity: No abnormal sleep movements.     Periodic Limb Activity - There were 4 PLMs during the entire study. The PLM index was 0.8 movements per hour. The PLM Arousal Index was 0.4 per hour.     REM EMG Activity - Excessive transient/sustained muscle activity was not present.     Nocturnal Behavior - Abnormal sleep related behaviors were not noted.     Bruxism - None apparent.     Cardiac Summary: Sinus rhythm.   The average pulse rate was 63.0 bpm. The minimum pulse rate was 53.9 bpm while the maximum pulse rate was 83.1 bpm. Arrhythmias were not noted.     Past medical/surgical history, family history, social history, medications and allergies were reviewed.           Physical Examination:   Ht 1.575 m (5' 2\")   Wt 86.2 kg (190 lb)   BMI 34.75 kg/m    General appearance: Awake, alert, cooperative. Well groomed. Sitting comfortably in chair. In no apparent distress.  HEENT: Head: Normocephalic, atraumatic. Eyes:Conjunctiva clear. Sclera normal. Nose: External appearance without deformity.   Pulmonary:  Able to speak easily in full sentences. No cough or wheeze.   Skin:  No rashes or significant lesions on visible skin.   Neurologic: Alert, oriented x3.   Psychiatric: Mood euthymic. Affect congruent with full range and intensity.    CC:  Luigi Driscoll PA-C  Mar 10, 2021     [facility]  [address] [place] [address]     [facility]  [address] [address]    Chart documentation was completed, in part, with [employer] voice-recognition software. Even though reviewed, some grammatical, spelling, and word errors may " remain.    Video-Visit Details    Type of service:  Video Visit    Video End Time:1:05PM    Originating Location (pt. Location): Home    Distant Location (provider location):  [facility]     Platform used for Video Visit: [employer]     44 minutes spent on day of encounter doing chart review, history and exam, documentation, and further activities as noted above

## 2021-03-10 NOTE — PATIENT INSTRUCTIONS
" SLEEP STUDY INTERPRETATION   DIAGNOSTIC POLYSOMNOGRAPHY REPORT   Patient: YESSI SCOTT   YOB: 1945   Study Date: 2/24/2021   MRN: [id number]   Referring Provider: MD Driscoll Erik   Ordering Provider: CAS Aburto Amber     Indications for Polysomnography: The patient is a 75 year old female who is 5' 2\" and weighs 194.0 lbs. Her BMI is 35.7, [place] sleepiness scale 5. Relevant medical history includes hypertension and diabetes with nephropathy. A diagnostic polysomnogram was performed to evaluate for sleep apnea.     Polysomnogram Data: A full night polysomnogram recorded the standard physiologic parameters including EEG, EOG, EMG, ECG, nasal and oral airflow. Respiratory parameters of chest and abdominal movements were recorded with respiratory inductance plethysmography. Oxygen saturation was recorded by pulse oximetry. Hypopnea scoring rule used: 1B 4%.     Sleep Architecture: Severe sleep disruption attributable to respiratory events with absence of stage N3 and prolonged wakefulness.   The total recording time of the polysomnogram was 462.5 minutes. The total sleep time was 307.5 minutes. Sleep latency was normal at 10.0 minutes without the use of a sleep aid. REM latency was 32.5 minutes. Arousal index was increased at 64.6 arousals per hour. Sleep efficiency was decreased at 66.5%. Wake after sleep onset was 139.0 minutes. The patient spent 33.0% of total sleep time in Stage N1, 42.9% in Stage N2, 0.0% in Stage N3, and 24.1% in REM. Time in REM supine was 15.0 minutes.     Respiration: Severe obstructive sleep apnea with hypoxemia.     Events ? The polysomnogram revealed a presence of 23 obstructive, 27 central, and 5 mixed apneas resulting in an apnea index of 10.7 events per hour. There were 118 obstructive hypopneas and - central hypopneas resulting in an obstructive hypopnea index of 23.0 and central hypopnea index of - events per hour. The combined apnea/hypopnea index was 33.8 events " per hour (central apnea/hypopnea index was 5.3 events per hour). The REM AHI was 49.5 events per hour. The supine AHI was 53.0 events per hour. The RERA index was 20.9 events per hour. The RDI was 54.6 events per hour.     Snoring - was reported as moderate/ intermittent.     Respiratory rate and pattern - was notable for normal respiratory rate and pattern.     Sustained Sleep Associated Hypoventilation - Transcutaneous carbon dioxide monitoring was not used.     Sleep Associated Hypoxemia - (Greater than 5 minutes O2 sat at or below 88%) was present. Baseline oxygen saturation was 94.3%. Lowest oxygen saturation was 72.4%. Time spent less than or equal to 88% was 8.4 minutes. Time spent less than or equal to 89% was 11.7 minutes.     Movement Activity: No abnormal sleep movements.     Periodic Limb Activity - There were 4 PLMs during the entire study. The PLM index was 0.8 movements per hour. The PLM Arousal Index was 0.4 per hour.     REM EMG Activity - Excessive transient/sustained muscle activity was not present.     Nocturnal Behavior - Abnormal sleep related behaviors were not noted.     Bruxism - None apparent.     Cardiac Summary: Sinus rhythm.   The average pulse rate was 63.0 bpm. The minimum pulse rate was 53.9 bpm while the maximum pulse rate was 83.1 bpm. Arrhythmias were not noted.     Assessment:     Severe sleep disruption attributable to respiratory events with absence of stage N3 and prolonged wakefulness.      Severe obstructive sleep apnea with hypoxemia.     No abnormal sleep movements.     Recommendations:     The most immediately effective therapy for severe obstructive sleep apnea with sleep disruption and hypoxemia is auto-tiration CPAP with enrollment in [facility] coordinated care. Mandibular advancement device or surgical therapy could be considered depending on patient preference and clinical setting.     Suggest optimizing sleep schedule and avoiding sleep deprivation.     Long- term  "weight loss may improve severity of sleep apnea in some patients.     Diagnostic Codes:   Obstructive Sleep Apnea G47.33   Sleep Hypoxemia/Hypoventilation G47.36   Repetitive Intrusions Into Sleep F51.8   2/24/2021 [place] [facility] Sleep Study (194.0 lbs) - AHI 33.8, RDI 54.6, Supine AHI 53.0, REM AHI 49.5, Low O2 72.4%, Time Spent ?88% 8.4 minutes / Time Spent ?89% 11.7 minutes. _____________________________________    ELECTRONICALLY SIGNED BY: ELYSE MENDEZ MD 13:42 2/28/2021        MY TREATMENT INFORMATION FOR SLEEP APNEA-  Essie Huddleston    Frequently asked questions:  1. What is Obstructive Sleep Apnea (KIM)? KIM is the most common type of sleep apnea. Apnea means, \"without breath.\"  Apnea is most often caused by narrowing or collapse of the upper airway as muscles relax during sleep.   Almost everyone has occasional apneas. Most people with sleep apnea have had brief interruptions at night frequently for many years.  The severity of sleep apnea is related to how frequent and severe the events are.   2. What are the consequences of KIM? Symptoms include: feeling sleepy during the day, snoring loudly, gasping or stopping of breathing, trouble sleeping, and occasionally morning headaches or heartburn at night.  Sleepiness can be serious and even increase the risk of falling asleep while driving. Other health consequences may include development of high blood pressure and other cardiovascular disease in persons who are susceptible. Untreated KIM  can contribute to heart disease, stroke and diabetes.   3. What are the treatment options? In most situations, sleep apnea is a lifelong disease that must be managed with daily therapy. Medications are not effective for sleep apnea and surgery is generally not considered until other therapies have been tried. Your treatment is your choice . Continuous Positive Airway (CPAP) works right away and is the therapy that is effective in nearly everyone. An oral device to hold " your jaw forward is usually the next most reliable option. Other options include postioning devices (to keep you off your back), weight loss, and surgery including a tongue pacing device. There is more detail about some of these options below.  4. Are my sleep studies covered by insurance? Although we will request verification of coverage, we advise you also check in advance of the study to ensure there is coverage.    Important tips for those choosing CPAP and similar devices   Know your equipment:  CPAP is continuous positive airway pressure that prevents obstructive sleep apnea by keeping the throat from collapsing while you are sleeping. In most cases, the device is  smart  and can slowly self-adjusts if your throat collapses and keeps a record every day of how well you are treated-this information is available to you and your care team.  BPAP is bilevel positive airway pressure that keeps your throat open and also assists each breath with a pressure boost to maintain adequate breathing.  Special kinds of BPAP are used in patients who have inadequate breathing from lung or heart disease. In most cases, the device is  smart  and can slowly self-adjusts to assist breathing. Like CPAP, the device keeps a record of how well you are treated.  Your mask is your connection to the device. You get to choose what feels most comfortable and the staff will help to make sure if fits. Here: are some examples of the different masks that are available:       Key points to remember on your journey with sleep apnea:  1. Sleep study.  PAP devices often need to be adjusted during a sleep study to show that they are effective and adjusted right.  2. Good tips to remember: Try wearing just the mask during a quiet time during the day so your body adapts to wearing it. A humidifier is recommended for comfort in most cases to prevent drying of your nose and throat. Allergy medication from your provider may help you if you are having  nasal congestion.  3. Getting settled-in. It takes more than one night for most of us to get used to wearing a mask. Try wearing just the mask during a quiet time during the day so your body adapts to wearing it. A humidifier is recommended for comfort in most cases. Our team will work with you carefully on the first day and will be in contact within 4 days and again at 2 and 4 weeks for advice and remote device adjustments. Your therapy is evaluated by the device each day.   4. Use it every night. The more you are able to sleep naturally for 7-8 hours, the more likely you will have good sleep and to prevent health risks or symptoms from sleep apnea. Even if you use it 4 hours it helps. Occasionally all of us are unable to use a medical therapy, in sleep apnea, it is not dangerous to miss one night.   5. Communicate. Call our skilled team on the number provided on the first day if your visit for problems that make it difficult to wear the device. Over 2 out of 3 patients can learn to wear the device long-term with help from our team. Remember to call our team or your sleep providers if you are unable to wear the device as we may have other solutions for those who cannot adapt to mask CPAP therapy. It is recommended that you sleep your sleep provider within the first 3 months and yearly after that if you are not having problems.   6. Use it for your health. We encourage use of CPAP masks during daytime quiet periods to allow your face and brain to adapt to the sensation of CPAP so that it will be a more natural sensation to awaken to at night or during naps. This can be very useful during the first few weeks or months of adapting to CPAP though it does not help medically to wear CPAP during wakefulness and  should not be used as a strategy just to meet guidelines.  7. Take care of your equipment. Make sure you clean your mask and tubing using directions every day and that your filter and mask are replaced as  recommended or if they are not working.     BESIDES CPAP, WHAT OTHER THERAPIES ARE THERE?    Oral Appliance  What is oral appliance therapy?  An oral appliance device fits on your teeth at night like a retainer used after having braces. The device is made by a specialized dentist and requires several visits over 1-2 months before a manufactured device is made to fit your teeth and is adjusted to prevent your sleep apnea. Once an oral device is working properly, snoring should be improved. A home sleep test may be recommended at that time if to determine whether the sleep apnea is adequately treated.       Some things to remember:  -Oral devices are often, but not always, covered by your medical insurance. Be sure to check with your insurance provider.   -If you are referred for oral therapy, you will be given a list of specialized dentists to consider or you may choose to visit the Web site of the American Academy of Dental Sleep Medicine  -Oral devices are less likely to work if you have severe sleep apnea or are extremely overweight.     More detailed information  An oral appliance is a small acrylic device that fits over the upper and lower teeth  (similar to a retainer or a mouth guard). This device slightly moves jaw forward, which moves the base of the tongue forward, opens the airway, improves breathing for effective treat snoring and obstructive sleep apnea in perhaps 7 out of 10 people .  The best working devices are custom-made by a dental device  after a mold is made of the teeth 1, 2, 3.  When is an oral appliance indicated?  Oral appliance therapy is recommended as a first-line treatment for patients with primary snoring, mild sleep apnea, and for patients with moderate sleep apnea who prefer appliance therapy to use of CPAP4, 5. Severity of sleep apnea is determined by sleep testing and is based on the number of respiratory events per hour of sleep.   How successful is oral appliance  therapy?  The success rate of oral appliance therapy in patients with mild sleep apnea is 75-80% while in patients with moderate sleep apnea it is 50-70%. The chance of success in patients with severe sleep apnea is 40-50%. The research also shows that oral appliances have a beneficial effect on the cardiovascular health of KIM patients at the same magnitude as CPAP therapy7.  Oral appliances should be a second-line treatment in cases of severe sleep apnea, but if not completely successful then a combination therapy utilizing CPAP plus oral appliance therapy may be effective. Oral appliances tend to be effective in a broad range of patients although studies show that the patients who have the highest success are females, younger patients, those with milder disease, and less severe obesity. 3, 6.   Finding a dentist that practices dental sleep medicine  Specific training is available through the American Academy of Dental Sleep Medicine for dentists interested in working in the field of sleep. To find a dentist who is educated in the field of sleep and the use of oral appliances, near you, visit the Web site of the American Academy of Dental Sleep Medicine.    References  1. Charles et al. Objectively measured vs self-reported compliance during oral appliance therapy for sleep-disordered breathing. Chest 2013; 144(5): 5570-0638.  2. Anita et al. Objective measurement of compliance during oral appliance therapy for sleep-disordered breathing. Thorax 2013; 68(1): 91-96.  3. Antonio et al. Mandibular advancement devices in 620 men and women with KIM and snoring: tolerability and predictors of treatment success. Chest 2004; 125: 5192-5752.  4. Heather, et al. Oral appliances for snoring and KIM: a review. Sleep 2006; 29: 244-262.  5. Fadia et al. Oral appliance treatment for KIM: an update. J Clin Sleep Med 2014; 10(2): 215-227.  6. Sky et al. Predictors of OSAH treatment outcome. J Ward Res  2007; 86: 7397-7013.      Weight Loss:    Weight loss is a long-term strategy that may improve sleep apnea in some patients.    Weight management is a personal decision and the decision should be based on your interest and the potential benefits.  If you are interested in exploring weight loss strategies, the following discussion covers the impact on weight loss on sleep apnea and the approaches that may be successful.    Being overweight does not necessarily mean you will have health consequences.  Those who have BMI over 35 or over 27 with existing medical conditions carries greater risk.   Weight loss decreases severity of sleep apnea in most people with obesity. For those with mild obesity who have developed snoring with weight gain, even 15-30 pound weight loss can improve and occasionally eliminate sleep apnea.  Structured and life-long dietary and health habits are necessary to lose weight and keep healthier weight levels.     Though there may be significant health benefits from weight loss, long-term weight loss is very difficult to achieve- studies show success with dietary management in less than 10% of people. In addition, substantial weight loss may require years of dietary control and may be difficult if patients have severe obesity. In these cases, surgical management may be considered.  Finally, older individuals who have tolerated obesity without health complications may be less likely to benefit from weight loss strategies.        Your BMI is Body mass index is 34.75 kg/m .  Weight management is a personal decision.  If you are interested in exploring weight loss strategies, the following discussion covers the approaches that may be successful. Body mass index (BMI) is one way to tell whether you are at a healthy weight, overweight, or obese. It measures your weight in relation to your height.  A BMI of 18.5 to 24.9 is in the healthy range. A person with a BMI of 25 to 29.9 is considered overweight,  and someone with a BMI of 30 or greater is considered obese. More than two-thirds of American adults are considered overweight or obese.  Being overweight or obese increases the risk for further weight gain. Excess weight may lead to heart disease and diabetes.  Creating and following plans for healthy eating and physical activity may help you improve your health.  Weight control is part of healthy lifestyle and includes exercise, emotional health, and healthy eating habits. Careful eating habits lifelong are the mainstay of weight control. Though there are significant health benefits from weight loss, long-term weight loss with diet alone may be very difficult to achieve- studies show long-term success with dietary management in less than 10% of people. Attaining a healthy weight may be especially difficult to achieve in those with severe obesity. In some cases, medications, devices and surgical management might be considered.  What can you do?  If you are overweight or obese and are interested in methods for weight loss, you should discuss this with your provider.     Consider reducing daily calorie intake by 500 calories.     Keep a food journal.     Avoiding skipping meals, consider cutting portions instead.    Diet combined with exercise helps maintain muscle while optimizing fat loss. Strength training is particularly important for building and maintaining muscle mass. Exercise helps reduce stress, increase energy, and improves fitness. Increasing exercise without diet control, however, may not burn enough calories to loose weight.       Start walking three days a week 10-20 minutes at a time    Work towards walking thirty minutes five days a week     Eventually, increase the speed of your walking for 1-2 minutes at time    In addition, we recommend that you review healthy lifestyles and methods for weight loss available through the National Institutes of Health patient information  sites:  http://win.niddk.nih.gov/publications/index.htm    And look into health and wellness programs that may be available through your health insurance provider, employer, local community center, or [employer].    Weight management plan: Patient was referred to their PCP to discuss a diet and exercise plan.

## 2021-03-13 DIAGNOSIS — Z79.4 TYPE 2 DIABETES MELLITUS WITH DIABETIC NEPHROPATHY, WITH LONG-TERM CURRENT USE OF INSULIN (H): ICD-10-CM

## 2021-03-13 DIAGNOSIS — I10 HYPERTENSION, UNSPECIFIED TYPE: ICD-10-CM

## 2021-03-13 DIAGNOSIS — E11.21 TYPE 2 DIABETES MELLITUS WITH DIABETIC NEPHROPATHY, WITH LONG-TERM CURRENT USE OF INSULIN (H): ICD-10-CM

## 2021-03-14 RX ORDER — LOSARTAN POTASSIUM 100 MG/1
TABLET ORAL
Qty: 90 TABLET | Refills: 3 | Status: SHIPPED | OUTPATIENT
Start: 2021-03-14 | End: 2022-03-05

## 2021-03-15 DIAGNOSIS — E11.21 TYPE 2 DIABETES MELLITUS WITH DIABETIC NEPHROPATHY, WITH LONG-TERM CURRENT USE OF INSULIN (H): ICD-10-CM

## 2021-03-15 DIAGNOSIS — Z79.4 TYPE 2 DIABETES MELLITUS WITH DIABETIC NEPHROPATHY, WITH LONG-TERM CURRENT USE OF INSULIN (H): ICD-10-CM

## 2021-03-16 RX ORDER — PEN NEEDLE, DIABETIC 32GX 5/32"
NEEDLE, DISPOSABLE MISCELLANEOUS
Qty: 50 EACH | Refills: 3 | Status: SHIPPED | OUTPATIENT
Start: 2021-03-16 | End: 2022-03-05

## 2021-03-16 RX ORDER — INSULIN DETEMIR 100 [IU]/ML
INJECTION, SOLUTION SUBCUTANEOUS
Qty: 30 ML | Refills: 1 | Status: SHIPPED | OUTPATIENT
Start: 2021-03-16 | End: 2021-09-16

## 2021-03-17 ENCOUNTER — IMMUNIZATION (OUTPATIENT)
Dept: NURSING | Facility: CLINIC | Age: 76
End: 2021-03-17
Payer: COMMERCIAL

## 2021-03-17 ENCOUNTER — DOCUMENTATION ONLY (OUTPATIENT)
Dept: SLEEP MEDICINE | Facility: CLINIC | Age: 76
End: 2021-03-17

## 2021-03-17 PROCEDURE — 0001A PR COVID VAC PFIZER DIL RECON 30 MCG/0.3 ML IM: CPT

## 2021-03-17 PROCEDURE — 91300 PR COVID VAC PFIZER DIL RECON 30 MCG/0.3 ML IM: CPT

## 2021-03-17 NOTE — PROGRESS NOTES
Patient was offered choice of vendor and chose [facility].  Patient Essie Huddleston was set up at [facility]  on March 17, 2021. Patient received a Resmed AirSense 10 Auto. Pressures were set at 5-15 cm H2O.   Patient s ramp is 5 cm H2O for Auto and FLEX/EPR is 2.  Patient received a Resmed Mask name: AIRFIT [id number]  Nasal mask size Medium, heated tubing and heated humidifier.  Patient does need to meet compliance. Patient has a follow up on 4/21/2021 with Alyssa Aburto PA-C .    Scott Spain

## 2021-03-22 ENCOUNTER — DOCUMENTATION ONLY (OUTPATIENT)
Dept: SLEEP MEDICINE | Facility: CLINIC | Age: 76
End: 2021-03-22

## 2021-03-22 NOTE — PROGRESS NOTES
3 DAY STM VISIT    Diagnostic AHI: 33.8    PSG    Patient contacted for 3 day STM visit    Confirmed with patient at time of call- N/A Patient is still interested in STM service     Message left for patient to return call    Replacement device: No  STM ordered by provider: Yes     Device type: Auto-CPAP  PAP settings from order::  CPAP min 5 cm  H20       CPAP max 15 cm  H20         Mask type:    Nasal Mask      Assessment: No usage reporting but has a profile in [employer]   Action plan: Patient to have 14 day STM visit. Patient has a follow up visit scheduled:   yes within 31-90 days of set up.     Total time spent on accessing and  interpreting remote patient PAP therapy data  10 minutes  Total time spent counseling, coaching  and reviewing PAP therapy data with patient  0 minutes  [id number] no

## 2021-04-01 ENCOUNTER — DOCUMENTATION ONLY (OUTPATIENT)
Dept: SLEEP MEDICINE | Facility: CLINIC | Age: 76
End: 2021-04-01
Payer: COMMERCIAL

## 2021-04-01 NOTE — PROGRESS NOTES
14  DAY STM VISIT    Diagnostic AHI: 33.8  PSG    Subjective measures:   Patient stated she is going to return her machine.    Assessment: Pt not meeting objective benchmarks for compliance  Patient failing following subjective benchmarks: not feeling benefit from therapy    Action plan: Patient plans on returning her machine.       Device type: Auto-CPAP    PAP settings: CPAP min 5 cm  H20       CPAP max 15 cm  H20      Mask type:  Nasal Mask    Objective measures: 14 day rolling measures      Objective measure goal  Compliance   Goal >70%  Leak   Goal < 24 lpm  AHI  Goal < 5  Usage  Goal >240        Total time spent on accessing and interpreting remote patient PAP therapy data  10 minutes    Total time spent counseling, coaching  and reviewing PAP therapy data with patient  2 minutes    [id number]  [id number]  no (3 day STM)

## 2021-04-07 ENCOUNTER — TRANSFERRED RECORDS (OUTPATIENT)
Dept: HEALTH INFORMATION MANAGEMENT | Facility: CLINIC | Age: 76
End: 2021-04-07

## 2021-04-07 ENCOUNTER — IMMUNIZATION (OUTPATIENT)
Dept: NURSING | Facility: CLINIC | Age: 76
End: 2021-04-07
Attending: INTERNAL MEDICINE
Payer: COMMERCIAL

## 2021-04-07 PROCEDURE — 91300 PR COVID VAC PFIZER DIL RECON 30 MCG/0.3 ML IM: CPT

## 2021-04-07 PROCEDURE — 0002A PR COVID VAC PFIZER DIL RECON 30 MCG/0.3 ML IM: CPT

## 2021-04-08 ENCOUNTER — DOCUMENTATION ONLY (OUTPATIENT)
Dept: HOME HEALTH SERVICES | Facility: CLINIC | Age: 76
End: 2021-04-08

## 2021-04-18 ENCOUNTER — HEALTH MAINTENANCE LETTER (OUTPATIENT)
Age: 76
End: 2021-04-18

## 2021-04-19 DIAGNOSIS — I10 ESSENTIAL HYPERTENSION WITH GOAL BLOOD PRESSURE LESS THAN 130/80: ICD-10-CM

## 2021-04-19 DIAGNOSIS — R00.2 PALPITATIONS: ICD-10-CM

## 2021-04-21 ENCOUNTER — VIRTUAL VISIT (OUTPATIENT)
Dept: SLEEP MEDICINE | Facility: CLINIC | Age: 76
End: 2021-04-21
Payer: COMMERCIAL

## 2021-04-21 DIAGNOSIS — G47.33 OSA (OBSTRUCTIVE SLEEP APNEA): Primary | ICD-10-CM

## 2021-04-21 PROCEDURE — 99213 OFFICE O/P EST LOW 20 MIN: CPT | Mod: 95 | Performed by: PHYSICIAN ASSISTANT

## 2021-04-21 RX ORDER — BISOPROLOL FUMARATE 5 MG/1
TABLET, FILM COATED ORAL
Qty: 60 TABLET | Refills: 2 | Status: SHIPPED | OUTPATIENT
Start: 2021-04-21 | End: 2021-10-12

## 2021-04-21 NOTE — PROGRESS NOTES
Essie is a 76 year old who is being evaluated via a billable video visit.      How would you like to obtain your AVS? MyChart  If the video visit is dropped, the invitation should be resent by: Text to cell phone: [phone]   Will anyone else be joining your video visit? No      Lamar Chatterjee MA on 4/21/2021 at 11:46 AM    Video Start Time: 12:01PM     [facility]   Outpatient Sleep Medicine  Apr 21, 2021       Name: Essie Huddleston MRN# [id number]   Age: 76 year old YOB: 1945            Assessment and Plan:   1. KIM (obstructive sleep apnea)    Patient presents to clinic today to discuss alternative treatment options to CPAP. Picked up CPAP machine from [facility] but never turned it on because she thought it would be too cumbersome. Discussed alterative treatment options including mandibular advancement device, surgical consideration, positional restriction. Patient understands that none of these options would be as effective as CPAP would be. Patient has elected treatment with oral appliance therapy. Referral placed today to sleep dentistry.   - SLEEP DENTAL REFERRAL     Essie Huddleston will follow up in about 3 months, or once she has oral appliance optimally titrated by sleep dentistry to discuss progress. May be useful for patient to complete sleep study with [facility] to verify efficiency (home testing not covered by insurance).        Chief Complaint      Chief Complaint   Patient presents with     CPAP Follow Up     Patient did not use cpap and returned device.             History of Present Illness:     Essie Huddleston is a 76 year old female who presents to the clinic for follow-up of their severe obstructive sleep apnea. 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    Originally diagnosed via PSG on 2/24/2021 (194lbs) with AHI 33.8, REM AHI 49.5, supine  "AHI 53.0, RDI 54.6. Sleep associated hypoxemia was present with giselle 72.4% and 8.4 minutes <88%.     At her last visit on 3/10/2021 patient elected treatment with CPAP. Received machine form [facility] on 3/17/21 but never turned it on and returned the machine on 4/7/2021. Patient reports the reason she returned the machine without use was because \"it was cumbersome to connect so many things and I thought I was going to make a mistake and it is too complicated\". Returns to clinic today to discuss alterative treatment options to CPAP.     SCALES:   INSOMNIA: Insomnia Severity Score: 14   SLEEPINESS: [place] Sleepiness Score: 5    Past medical/surgical history, family history, social history, medications and allergies were reviewed.           Physical Examination:   There were no vitals taken for this visit.  General appearance: Awake, alert, cooperative. Well groomed. Sitting comfortably in chair. In no apparent distress.  HEENT: Head: Normocephalic, atraumatic. Eyes:Conjunctiva clear. Sclera normal. Nose: External appearance without deformity.   Pulmonary:  Able to speak easily in full sentences. No cough or wheeze.   Skin:  No rashes or significant lesions on visible skin.   Neurologic: Alert, oriented x3.   Psychiatric: Mood euthymic. Affect congruent with full range and intensity.      CC:  Luigi Driscoll PA-C  Apr 21, 2021     [facility]  [address] [place] [address]     [facility]  [address] [id number]    Chart documentation was completed, in part, with [employer] voice-recognition software. Even though reviewed, some grammatical, spelling, and word errors may remain.    Video-Visit Details    Type of service:  Video Visit    Video End Time:12:19PM    Originating Location (pt. Location): Home    Distant Location (provider location):  [facility]     Platform used for Video Visit: " Mary    28 minutes spent on day of encounter doing chart review, history and exam, documentation, and further activities as noted above

## 2021-04-21 NOTE — PATIENT INSTRUCTIONS
[facility]  Search engine: https://mms.aadsm.org/members/directory/search_bootstrap.php?org_id=ADSM&   Certified in Dental Sleep Medicine    Germán Hancock  Degree: DDS  [address]  [address]  [place] [address]  Professional Phone: [phone]  Website: [email or web address]    Julio Rivero  Degree: DDS  [facility]  [address]  [address]  [place] [address]  Professional Phone: [phone]: [phone]    Charu Damon  [facility]  [address]  [place] [address]  Professional Phone: [phone]  Website: [email or web address]      Nic Jacobsen  Degree: DDS  [address]  [address]  [place] [address]  Professional Phone: [phone]  Fax: [phone]    Al Sesay  Degree: DDS  [facility]  [address]  [address]  [place] [address]  Professional Phone: [phone]  Website: [email or web address]      [facility]  [facility]  [address]  [address]  [place] [address]  Professional Phone: [phone]  Website: [email or web address]      Ijeoma Barbosa  Degree: DDS  [facility]  [address]  [address]  [place] [address]  Professional Phone: [phone]     Frances Gay  Degree: DDS, PhD  [facility]  [address] [place] [address] [address] [place] [address]   [address]   [address]   [place] [address]   Appointments: [phone]   Fax: [phone]   [facility]   [address]   [address]   [place] [address]   Appointments: [phone]   Fax: [phone]                Pablito Fajardo  Degree: DDS  [address] [place] [address]  Professional Phone: [phone]  Fax: [phone]  Website: [email or web address]      Mauriciorico Li  Degree: MANGO  [facility]  [address] [place] [address]    Chenona Mulet Pradera  Degree: MS MANGO  [facility]  [address] [place] [address]  Professional Phone: [phone]      Lainey Mcnulty  Degree: MS MANGO  [facility]  [address]  [address]  [place] [address]  Professional Phone: [phone]    Zenobia Colin  Degree: MANGO  [facility]  [address]  [address]  [place] [address]  [place] Office     Gianluca Silvestre  Degree: MANGO  [facility]  [address] [place] [address]  Professional Phone: [phone]  Website: [email or web address]      Kane Ordonez  Degree: MANGO  [facility]  [address] [place] [address]  Professional Phone: [phone]  Fax: [phone]      Sunny Boggs  Degree: MANGO [facility]  [address]  [address]  [place] [address]                 ACCEPT MEDICARE  Kartik Roberts DDS  [address], [place] [address]  [phone]; [phone] (fax)  [employer]    Rl Obregon DDS, MS   [facility]   [address]   [address]   [place] [address]   Appointments: [phone]   Fax: [phone]     Brayan Lucianonorma COBIAN  [address]  [place] [address]  Appointments [phone]  Fax: [phone]    ADDITIONAL PROVIDERS  Renard Robles DDS   [facility]   [address]   [address]   [place] [address]   Appointments: [phone]   Fax: [phone]       Erich Ruiz DDS, MS   [facility]  [address] [place] [address]   Appointments: [phone] Ext: [phone]  Fax: [phone]   [email or web address]

## 2021-04-23 ENCOUNTER — TELEPHONE (OUTPATIENT)
Dept: SLEEP MEDICINE | Facility: CLINIC | Age: 76
End: 2021-04-23

## 2021-04-23 NOTE — TELEPHONE ENCOUNTER
Reason for call:  Other   Patient called regarding (reason for call): call back  Additional comments: patient's daughter is calling because she was emailed a list of dental offices the patient could use, but it has been misplaced. Can the email please be resent. Please call patient back.     Phone number to reach patient:  Home number on file [phone] (home)    Best Time:  Any time    Can we leave a detailed message on this number?  YES    Travel screening: Not Applicable

## 2021-04-24 NOTE — TELEPHONE ENCOUNTER
Pt called and message left informing pt that dental referral will be sent via email per request.    LILY Rocha

## 2021-05-04 ENCOUNTER — TELEPHONE (OUTPATIENT)
Dept: SLEEP MEDICINE | Facility: CLINIC | Age: 76
End: 2021-05-04

## 2021-05-04 NOTE — TELEPHONE ENCOUNTER
Reason for call:  Other   Patient called regarding (reason for call): pt needs copy of sleep study results so that she can get her sleep apnea mouth guard     Additional comments: gladys Lopez     please fax it over to [facility] [phone] [place]     Phone number to reach patient:  [phone]      Best Time:  Anytime time over 2pm     Can we leave a detailed message on this number?  YES    Travel screening: Not Applicable

## 2021-05-18 DIAGNOSIS — Z79.4 TYPE 2 DIABETES MELLITUS WITH DIABETIC NEPHROPATHY, WITH LONG-TERM CURRENT USE OF INSULIN (H): ICD-10-CM

## 2021-05-18 DIAGNOSIS — E11.21 TYPE 2 DIABETES MELLITUS WITH DIABETIC NEPHROPATHY, WITH LONG-TERM CURRENT USE OF INSULIN (H): ICD-10-CM

## 2021-05-18 DIAGNOSIS — G89.29 CHRONIC RIGHT-SIDED LOW BACK PAIN WITH RIGHT-SIDED SCIATICA: ICD-10-CM

## 2021-05-18 DIAGNOSIS — M54.41 CHRONIC RIGHT-SIDED LOW BACK PAIN WITH RIGHT-SIDED SCIATICA: ICD-10-CM

## 2021-05-18 DIAGNOSIS — F11.90 CHRONIC, CONTINUOUS USE OF OPIOIDS: ICD-10-CM

## 2021-05-18 NOTE — TELEPHONE ENCOUNTER
Tramadol    Routing refill request to provider for review/approval because:  Drug not on the [facility] refill protocol

## 2021-05-19 DIAGNOSIS — E11.21 TYPE 2 DIABETES MELLITUS WITH DIABETIC NEPHROPATHY, WITH LONG-TERM CURRENT USE OF INSULIN (H): ICD-10-CM

## 2021-05-19 DIAGNOSIS — Z79.4 TYPE 2 DIABETES MELLITUS WITH DIABETIC NEPHROPATHY, WITH LONG-TERM CURRENT USE OF INSULIN (H): ICD-10-CM

## 2021-05-19 RX ORDER — TRAMADOL HYDROCHLORIDE 50 MG/1
TABLET ORAL
Qty: 60 TABLET | Refills: 0 | Status: SHIPPED | OUTPATIENT
Start: 2021-05-19 | End: 2021-06-09

## 2021-05-20 NOTE — TELEPHONE ENCOUNTER
Has appt with me 6/8/21. [place]  reviewed Last RF 4/21/21. OK for RF to be picked up tomorrow. Will address UDS and update CSA at upcoming appt

## 2021-05-21 RX ORDER — GLIPIZIDE 5 MG/1
TABLET ORAL
Qty: 180 TABLET | Refills: 0 | Status: SHIPPED | OUTPATIENT
Start: 2021-05-21 | End: 2021-06-09

## 2021-06-01 ENCOUNTER — TRANSFERRED RECORDS (OUTPATIENT)
Dept: HEALTH INFORMATION MANAGEMENT | Facility: CLINIC | Age: 76
End: 2021-06-01

## 2021-06-02 ENCOUNTER — THERAPY VISIT (OUTPATIENT)
Dept: PHYSICAL THERAPY | Facility: CLINIC | Age: 76
End: 2021-06-02
Payer: COMMERCIAL

## 2021-06-02 ENCOUNTER — OFFICE VISIT (OUTPATIENT)
Dept: INTERNAL MEDICINE | Facility: CLINIC | Age: 76
End: 2021-06-02
Payer: COMMERCIAL

## 2021-06-02 VITALS
RESPIRATION RATE: 18 BRPM | WEIGHT: 193.3 LBS | HEART RATE: 83 BPM | BODY MASS INDEX: 35.36 KG/M2 | SYSTOLIC BLOOD PRESSURE: 136 MMHG | DIASTOLIC BLOOD PRESSURE: 70 MMHG | OXYGEN SATURATION: 97 %

## 2021-06-02 DIAGNOSIS — M54.41 RIGHT-SIDED LOW BACK PAIN WITH RIGHT-SIDED SCIATICA, UNSPECIFIED CHRONICITY: Primary | ICD-10-CM

## 2021-06-02 DIAGNOSIS — E78.5 HYPERLIPIDEMIA LDL GOAL <100: ICD-10-CM

## 2021-06-02 DIAGNOSIS — Z79.4 TYPE 2 DIABETES MELLITUS WITH DIABETIC NEPHROPATHY, WITH LONG-TERM CURRENT USE OF INSULIN (H): ICD-10-CM

## 2021-06-02 DIAGNOSIS — M54.41 ACUTE RIGHT-SIDED LOW BACK PAIN WITH RIGHT-SIDED SCIATICA: ICD-10-CM

## 2021-06-02 DIAGNOSIS — E11.21 TYPE 2 DIABETES MELLITUS WITH DIABETIC NEPHROPATHY, WITH LONG-TERM CURRENT USE OF INSULIN (H): ICD-10-CM

## 2021-06-02 LAB
ALBUMIN SERPL-MCNC: 4.1 G/DL (ref 3.4–5)
ALP SERPL-CCNC: 68 U/L (ref 40–150)
ALT SERPL W P-5'-P-CCNC: 23 U/L (ref 0–50)
ANION GAP SERPL CALCULATED.3IONS-SCNC: 2 MMOL/L (ref 3–14)
AST SERPL W P-5'-P-CCNC: 13 U/L (ref 0–45)
BILIRUB SERPL-MCNC: 0.4 MG/DL (ref 0.2–1.3)
BUN SERPL-MCNC: 14 MG/DL (ref 7–30)
CALCIUM SERPL-MCNC: 9.1 MG/DL (ref 8.5–10.1)
CHLORIDE SERPL-SCNC: 106 MMOL/L (ref 94–109)
CHOLEST SERPL-MCNC: 144 MG/DL
CO2 SERPL-SCNC: 32 MMOL/L (ref 20–32)
CREAT SERPL-MCNC: 0.74 MG/DL (ref 0.52–1.04)
CREAT UR-MCNC: 28 MG/DL
GFR SERPL CREATININE-BSD FRML MDRD: 80 ML/MIN/{1.73_M2}
GLUCOSE SERPL-MCNC: 107 MG/DL (ref 70–99)
HBA1C MFR BLD: 7.5 % (ref 0–5.6)
HDLC SERPL-MCNC: 72 MG/DL
LDLC SERPL CALC-MCNC: 50 MG/DL
MICROALBUMIN UR-MCNC: <5 MG/L
MICROALBUMIN/CREAT UR: NORMAL MG/G CR (ref 0–25)
NONHDLC SERPL-MCNC: 72 MG/DL
POTASSIUM SERPL-SCNC: 4.4 MMOL/L (ref 3.4–5.3)
PROT SERPL-MCNC: 7.7 G/DL (ref 6.8–8.8)
SODIUM SERPL-SCNC: 140 MMOL/L (ref 133–144)
TRIGL SERPL-MCNC: 109 MG/DL

## 2021-06-02 PROCEDURE — 36415 COLL VENOUS BLD VENIPUNCTURE: CPT | Performed by: INTERNAL MEDICINE

## 2021-06-02 PROCEDURE — 97112 NEUROMUSCULAR REEDUCATION: CPT | Mod: GP | Performed by: PHYSICAL THERAPIST

## 2021-06-02 PROCEDURE — 80061 LIPID PANEL: CPT | Performed by: INTERNAL MEDICINE

## 2021-06-02 PROCEDURE — 82043 UR ALBUMIN QUANTITATIVE: CPT | Performed by: INTERNAL MEDICINE

## 2021-06-02 PROCEDURE — 80053 COMPREHEN METABOLIC PANEL: CPT | Performed by: INTERNAL MEDICINE

## 2021-06-02 PROCEDURE — 99213 OFFICE O/P EST LOW 20 MIN: CPT | Performed by: PHYSICIAN ASSISTANT

## 2021-06-02 PROCEDURE — 97110 THERAPEUTIC EXERCISES: CPT | Mod: GP | Performed by: PHYSICAL THERAPIST

## 2021-06-02 PROCEDURE — 97161 PT EVAL LOW COMPLEX 20 MIN: CPT | Mod: GP | Performed by: PHYSICAL THERAPIST

## 2021-06-02 PROCEDURE — 83036 HEMOGLOBIN GLYCOSYLATED A1C: CPT | Performed by: INTERNAL MEDICINE

## 2021-06-02 RX ORDER — METHYLPREDNISOLONE 4 MG
TABLET, DOSE PACK ORAL
Qty: 21 TABLET | Refills: 0 | Status: SHIPPED | OUTPATIENT
Start: 2021-06-02 | End: 2021-06-08

## 2021-06-02 NOTE — PROGRESS NOTES
Assessment & Plan     Right-sided low back pain with right-sided sciatica, unspecified chronicity    - methylPREDNISolone (MEDROL DOSEPAK) 4 MG tablet therapy pack; Follow Package Directions  - [facility] REF[facility]; Future             Heat icing   PT   Massage is ok   F/u not improving     Return in about 4 weeks (around 6/30/2021) for recheck if not improving, regular primary provider.    [facility] JAUN Fountain is a 76 year old who presents for the following health issues     HPI     Back Pain  Onset/Duration: 2 weeks  Description:   Location of pain: low back bilateral  Character of pain: sharp  Pain radiation: radiates into the right buttocks, radiates into the right leg, radiates into the left buttocks and radiates into the left leg  New numbness or weakness in legs, not attributed to pain: no   Intensity: Currently 6/10  Progression of Symptoms: worsening and intermittent  History:   Specific cause: none  Pain interferes with job: no  History of back problems: no prior back problems  Any previous MRI or X-rays: None  Sees a specialist for back pain: No  Alleviating factors:   Improved by: rest    Precipitating factors:  Worsened by: Sitting and Walking  Therapies tried and outcome:     Accompanying Signs & Symptoms:  Risk of Fracture: None  Risk of Cauda Equina: None  Risk of Infection: None  Risk of Cancer: None  Risk of Ankylosing Spondylitis: Onset at age <35, male, AND morning back stiffness no            Review of Systems   Constitutional, HEENT, cardiovascular, pulmonary, gi and gu systems are negative, except as otherwise noted.      Objective    /70   Pulse 83   Resp 18   Wt 87.7 kg (193 lb 4.8 oz)   SpO2 97%   BMI 35.36 kg/m    Body mass index is 35.36 kg/m .  Physical Exam   GENERAL: healthy, alert and no distress  RESP: lungs clear to auscultation - no rales, rhonchi or wheezes  CV: regular rates and rhythm and  normal S1 S2, no S3 or S4  MS: tenderness right lower lumbar to sacral paraspinous muscles and mid right buttock   SKIN: no suspicious lesions or rashes  NEURO: Normal strength and tone, mentation intact and speech normal

## 2021-06-02 NOTE — PROGRESS NOTES
"Physical Therapy Initial Evaluation  Subjective:  The history is provided by the patient. No  was used.   Patient Health History  Essie Huddleston being seen for R LBP/leg.     Problem began: 5/19/2021.   Problem occurred: after a \"strenuous\" 3-mile walk   Pain is reported as 6/10 on pain scale.  General health as reported by patient is good.  Pertinent medical history includes: high blood pressure and diabetes.   Red flags:  None as reported by patient.  Medical allergies: none.   Surgeries include:  None.    Current medications:  High blood pressure medication and pain medication.    Current occupation is care giver.                     Therapist Generated HPI Evaluation  Problem details: Onset of R LBP and leg pain to foot 2 weeks ago after a strenuous 3-mile walk. Notes pain with sitting, walking, bending. Painful to step thru R leg after sitting.         Type of problem:  Lumbar.    This is a new condition.  Condition occurred with:  Other reason.  Where condition occurred: in the community.  Patient reports pain:  Lumbar spine right.  Pain is described as sharp and aching and is intermittent.  Pain radiates to:  Gluteals right, thigh right and lower leg right. Pain is worse in the A.M..  Since onset symptoms are unchanged.  Associated symptoms:  Tingling (in right lower leg/foot). Symptoms are exacerbated by sitting, bending and walking (bearing weight thru R leg)  and relieved by rest.      Barriers include:  None as reported by patient.                        Objective:    Gait:    Gait Type:  Antalgic   Assistive Devices:  Cane  Deviations:  General Deviations:  Day decr and stance time decr               Lumbar/SI Evaluation  ROM:    AROM Lumbar:   Flexion:          Hands to mid lower leg (pain on return)  Ext:                    25%   Side Bend:        Left:     Right:   Rotation:           Left:     Right:   Side Glide:        Left:  50%    Right:  50%          Lumbar Myotomes:  not " assessed            Lumbar DTR's:  not assessed        Lumbar Dermtomes:  not assessed                                                                         [place] Lumbar Evaluation    Posture:  Sitting: fair  Standing: fair  Lordosis: WNL  Lateral Shift: no  Correction of Posture: better      Test Movements:    EIS: During: no effect    Repeat EIS: During: decreases  After: better  Mechanical Response: IncROM            Conclusion: derangement  Principle of Treatment:  Posture Correction: yes    Extension: JIE x 10 every 2 hours    Other: body mechanics                                       ROS    Assessment/Plan:    Patient is a 76 year old female with lumbar complaints.  She ambulates with a SE cane d/t R leg pain and balance concerns. Does not normally use one. Was using cane in wrong hand, corrected gait with SE cane. Some difficulty advancing cane with R leg, better with practice. JIE decreased pain and increased motion. Corrected sitting posture as well.    Patient has the following significant findings with corresponding treatment plan.                Diagnosis 1:  R lumbar/leg  Pain -  manual therapy, self management, education, directional preference exercise and home program  Decreased ROM/flexibility - manual therapy and therapeutic exercise  Impaired gait - gait training  Decreased function - therapeutic activities  Impaired posture - neuro re-education    Therapy Evaluation Codes:   Cumulative Therapy Evaluation is: Low complexity.    Previous and current functional limitations:  (See Goal Flow Sheet for this information)    Short term and Long term goals: (See Goal Flow Sheet for this information)     Communication ability:  Patient appears to be able to clearly communicate and understand verbal and written communication and follow directions correctly.  Treatment Explanation - The following has been discussed with the patient:   RX ordered/plan of care  Anticipated outcomes  Possible risks and  side effects  This patient would benefit from PT intervention to resume normal activities.   Rehab potential is good.    Frequency:  1 X week, once daily  Duration:  for 6 weeks  Discharge Plan:  Achieve all LTG.  Independent in home treatment program.  Reach maximal therapeutic benefit.    Please refer to the daily flowsheet for treatment today, total treatment time and time spent performing 1:1 timed codes.

## 2021-06-03 ENCOUNTER — MEDICAL CORRESPONDENCE (OUTPATIENT)
Dept: HEALTH INFORMATION MANAGEMENT | Facility: CLINIC | Age: 76
End: 2021-06-03

## 2021-06-03 ENCOUNTER — CARE COORDINATION (OUTPATIENT)
Dept: SLEEP MEDICINE | Facility: CLINIC | Age: 76
End: 2021-06-03

## 2021-06-03 NOTE — PROGRESS NOTES
Faxed signed CMN for dental device along with dental referral to [place] [facility], Dr. Glory Lopez FAX:  [phone]

## 2021-06-08 ENCOUNTER — OFFICE VISIT (OUTPATIENT)
Dept: INTERNAL MEDICINE | Facility: CLINIC | Age: 76
End: 2021-06-08
Payer: COMMERCIAL

## 2021-06-08 VITALS
RESPIRATION RATE: 16 BRPM | TEMPERATURE: 97.4 F | WEIGHT: 192 LBS | HEART RATE: 69 BPM | BODY MASS INDEX: 35.33 KG/M2 | SYSTOLIC BLOOD PRESSURE: 138 MMHG | OXYGEN SATURATION: 96 % | DIASTOLIC BLOOD PRESSURE: 74 MMHG | HEIGHT: 62 IN

## 2021-06-08 DIAGNOSIS — Z79.4 TYPE 2 DIABETES MELLITUS WITH DIABETIC NEPHROPATHY, WITH LONG-TERM CURRENT USE OF INSULIN (H): ICD-10-CM

## 2021-06-08 DIAGNOSIS — G56.03 BILATERAL CARPAL TUNNEL SYNDROME: ICD-10-CM

## 2021-06-08 DIAGNOSIS — E66.01 SEVERE OBESITY WITH BODY MASS INDEX (BMI) OF 35.0 TO 39.9 WITH COMORBIDITY (H): ICD-10-CM

## 2021-06-08 DIAGNOSIS — G89.29 CHRONIC RIGHT-SIDED LOW BACK PAIN WITHOUT SCIATICA: ICD-10-CM

## 2021-06-08 DIAGNOSIS — F11.90 CHRONIC, CONTINUOUS USE OF OPIOIDS: ICD-10-CM

## 2021-06-08 DIAGNOSIS — E11.21 TYPE 2 DIABETES MELLITUS WITH DIABETIC NEPHROPATHY, WITH LONG-TERM CURRENT USE OF INSULIN (H): ICD-10-CM

## 2021-06-08 DIAGNOSIS — M54.50 CHRONIC RIGHT-SIDED LOW BACK PAIN WITHOUT SCIATICA: ICD-10-CM

## 2021-06-08 DIAGNOSIS — K64.4 EXTERNAL HEMORRHOIDS: ICD-10-CM

## 2021-06-08 DIAGNOSIS — E78.5 HYPERLIPIDEMIA LDL GOAL <100: ICD-10-CM

## 2021-06-08 DIAGNOSIS — I10 ESSENTIAL HYPERTENSION WITH GOAL BLOOD PRESSURE LESS THAN 130/80: ICD-10-CM

## 2021-06-08 PROCEDURE — 99214 OFFICE O/P EST MOD 30 MIN: CPT | Performed by: INTERNAL MEDICINE

## 2021-06-08 PROCEDURE — 80307 DRUG TEST PRSMV CHEM ANLYZR: CPT | Performed by: INTERNAL MEDICINE

## 2021-06-08 RX ORDER — CELECOXIB 100 MG/1
100 CAPSULE ORAL 2 TIMES DAILY
Qty: 60 CAPSULE | Refills: 3 | Status: SHIPPED | OUTPATIENT
Start: 2021-06-08 | End: 2021-10-13

## 2021-06-08 ASSESSMENT — ANXIETY QUESTIONNAIRES
2. NOT BEING ABLE TO STOP OR CONTROL WORRYING: NOT AT ALL
5. BEING SO RESTLESS THAT IT IS HARD TO SIT STILL: SEVERAL DAYS
IF YOU CHECKED OFF ANY PROBLEMS ON THIS QUESTIONNAIRE, HOW DIFFICULT HAVE THESE PROBLEMS MADE IT FOR YOU TO DO YOUR WORK, TAKE CARE OF THINGS AT HOME, OR GET ALONG WITH OTHER PEOPLE: NOT DIFFICULT AT ALL
3. WORRYING TOO MUCH ABOUT DIFFERENT THINGS: NOT AT ALL
1. FEELING NERVOUS, ANXIOUS, OR ON EDGE: NOT AT ALL
7. FEELING AFRAID AS IF SOMETHING AWFUL MIGHT HAPPEN: NOT AT ALL
6. BECOMING EASILY ANNOYED OR IRRITABLE: NOT AT ALL
GAD7 TOTAL SCORE: 1

## 2021-06-08 ASSESSMENT — MIFFLIN-ST. JEOR: SCORE: 1319.16

## 2021-06-08 ASSESSMENT — PATIENT HEALTH QUESTIONNAIRE - PHQ9
SUM OF ALL RESPONSES TO PHQ QUESTIONS 1-9: 2
5. POOR APPETITE OR OVEREATING: NOT AT ALL

## 2021-06-08 NOTE — PROGRESS NOTES
ASSESSMENT:   1. Type 2 diabetes mellitus with diabetic nephropathy, with long-term current use of insulin (H)  Blood sugars worsened and current control above goal due to worsened diet which is now improved again.  Most recent morning blood sugars now controlled.  Continue current medication along with improved diet and recheck A1c 3 months  - Hemoglobin A1c; Future  - metFORMIN (GLUCOPHAGE) 1000 MG tablet; Take 1 tablet (1,000 mg) by mouth 2 times daily (with meals)  Dispense: 180 tablet; Refill: 3  - glipiZIDE (GLUCOTROL) 5 MG tablet; TAKE 1 TABLET BY MOUTH TWICE DAILY BEFORE MEAL(S)  Dispense: 180 tablet; Refill: 3    2. Severe obesity with body mass index (BMI) of 35.0 to 39.9 with comorbidity (H)  Weight increase.  Contributing to diabetes, hyperlipidemia, joint pain issues.  Counseled regarding now continuing improved calorie/carbohydrate reduction in diet.  Walking exercise as able    3. Bilateral carpal tunnel syndrome  Patient states symptoms not better enough to undergo surgical treatment.  We will use wrist braces at night and when doing frequent hand activity such as weeding    4. Chronic, continuous use of opioids  Urine drug screen checked today.  Continue current medications.  Controlled substance agreement signed.  [facility] reviewed and appropriate.  Denies side effects with current medication  - Drug Confirmation Panel Urine with Creat  - traMADol (ULTRAM) 50 MG tablet; Take 1 tablet (50 mg) by mouth 2 times daily  Dispense: 60 tablet; Refill: 2    5. Chronic right-sided low back pain without sciatica   See#4  - Drug Confirmation Panel Urine with Creat  - celecoxib (CELEBREX) 100 MG capsule; Take 1 capsule (100 mg) by mouth 2 times daily  Dispense: 60 capsule; Refill: 3  - traMADol (ULTRAM) 50 MG tablet; Take 1 tablet (50 mg) by mouth 2 times daily  Dispense: 60 tablet; Refill: 2    6. Essential hypertension with goal blood pressure less than 130/80  Blood pressure minimally above goal.   Continue current medication and will work on weight loss as above    7. External hemorrhoids  No bleeding issues.  Patient will use Tucks pads as needed    8. Hyperlipidemia LDL goal <100  Controlled.  Continue statin therapy.  Repeat labs 1 year per healthcare maintenance protocol orders  - simvastatin (ZOCOR) 20 MG tablet; Take 1 tablet (20 mg) by mouth At Bedtime  Dispense: 90 tablet; Refill: 3        PLAN:  Wrist brace at night and when weeding. If wrist/hand pain worsens despite this , then contact clinic and will refer to [facility]  Celebrex 100mg capsule, 1 capsule twice a day with foods for pain  Continue other meds.  Call  [phone] or use [employer] to schedule a future lab appointment  non-fasting in 3 months for diabetes.  See me a few days later either in clinic or with virtual visit to review results, blood sugars and chronic pain management  Check blood sugars twice a day (before breakfast and bedtime) for 4 days prior to the appointment with me, write them down and have them available to review with the appointment   May use Tucks pads after wiping with toilet paper for bowel movements as needed for itching in rectal area. Get over the counter   Urine drug screen   Controlled substance agreement signed   May fill tramadol prescription refill starting  6/19/21  Continue to reduce calorie/carbohydrate (sugar, bread, potato, pasta, rice, alcohol etc)  intake in diet and increase color on your plate with fruits and vegetables to improve blood sugar control and for weight loss.. Increase  frequency of walking or other aerobic exercise as able         (Chart documentation was completed, in part, with [employer] voice-recognition software. Even though reviewed, some grammatical, spelling, and word errors may remain.)    Luigi Driscoll MD  Internal Medicine Department  [facility] [place]      Netta Fountain is a 76 year old who presents for the following health issues      HPI     Diabetes Follow-up    How often are you checking your blood sugar? One time daily  What time of day are you checking your blood sugars (select all that apply)?  VARIES, but within the passed week it has been before eating  Have you had any blood sugars above 200?  No  Have you had any blood sugars below 70?  No    What symptoms do you notice when your blood sugar is low?  Shaky and Weak-When Patient does not eat. No recent sx    What concerns do you have today about your diabetes? None   Do you have any of these symptoms? (Select all that apply)  Numbness in feet periodically             Hyperlipidemia Follow-Up      Are you regularly taking any medication or supplement to lower your cholesterol?   Yes- Simvastatin    Are you having muscle aches or other side effects that you think could be caused by your cholesterol lowering medication?  No    Hypertension Follow-up      Do you check your blood pressure regularly outside of the clinic? No     Are you following a low salt diet? Yes    Are your blood pressures ever more than 140 on the top number (systolic) OR more   than 90 on the bottom number (diastolic), for example 140/90? Unknown    BP Readings from Last 2 Encounters:   06/08/21 138/74   06/02/21 136/70     Hemoglobin A1C (%)   Date Value   06/02/2021 7.5 (H)   01/26/2021 7.3 (H)     LDL Cholesterol Calculated (mg/dL)   Date Value   06/02/2021 50   07/23/2020 31     Most recent lab results reviewed with pt.      Component      Latest Ref Rng & Units 7/23/2020 7/29/2020 1/26/2021   Sodium      133 - 144 mmol/L 140  139   Potassium      3.4 - 5.3 mmol/L 4.4  4.4   Chloride      94 - 109 mmol/L 107  105   Carbon Dioxide      20 - 32 mmol/L 28  31   Anion Gap      3 - 14 mmol/L 5  3   Glucose      70 - 99 mg/dL 87  70   Urea Nitrogen      7 - 30 mg/dL 9  22   Creatinine      0.52 - 1.04 mg/dL 0.63  0.95   GFR Estimate      >60 mL/min/1.73:m2 88  58 (L)   GFR Estimate If Black      >60 mL/min/1.73:m2 >90   68   Calcium      8.5 - 10.1 mg/dL 9.0  9.8   Bilirubin Total      0.2 - 1.3 mg/dL 0.3     Albumin      3.4 - 5.0 g/dL 3.8     Protein Total      6.8 - 8.8 g/dL 7.3     Alkaline Phosphatase      40 - 150 U/L 63     ALT      0 - 50 U/L 19     AST      0 - 45 U/L 10     Cholesterol      <200 mg/dL 127     Triglycerides      <150 mg/dL 141     HDL Cholesterol      >49 mg/dL 68     LDL Cholesterol Calculated      <100 mg/dL 31     Non HDL Cholesterol      <130 mg/dL 59     Creatinine Urine      mg/dL 48     Albumin Urine mg/L      mg/L 5     Albumin Urine mg/g Cr      0 - 25 mg/g Cr 10.90     Hemoglobin A1C      0 - 5.6 %  6.8 (H) 7.3 (H)     Component      Latest Ref Rng & Units 6/2/2021   Sodium      133 - 144 mmol/L 140   Potassium      3.4 - 5.3 mmol/L 4.4   Chloride      94 - 109 mmol/L 106   Carbon Dioxide      20 - 32 mmol/L 32   Anion Gap      3 - 14 mmol/L 2 (L)   Glucose      70 - 99 mg/dL 107 (H)   Urea Nitrogen      7 - 30 mg/dL 14   Creatinine      0.52 - 1.04 mg/dL 0.74   GFR Estimate      >60 mL/min/1.73:m2 80   GFR Estimate If Black      >60 mL/min/1.73:m2 >90   Calcium      8.5 - 10.1 mg/dL 9.1   Bilirubin Total      0.2 - 1.3 mg/dL 0.4   Albumin      3.4 - 5.0 g/dL 4.1   Protein Total      6.8 - 8.8 g/dL 7.7   Alkaline Phosphatase      40 - 150 U/L 68   ALT      0 - 50 U/L 23   AST      0 - 45 U/L 13   Cholesterol      <200 mg/dL 144   Triglycerides      <150 mg/dL 109   HDL Cholesterol      >49 mg/dL 72   LDL Cholesterol Calculated      <100 mg/dL 50   Non HDL Cholesterol      <130 mg/dL 72   Creatinine Urine      mg/dL 28   Albumin Urine mg/L      mg/L <5   Albumin Urine mg/g Cr      0 - 25 mg/g Cr Unable to calculate due to low value   Hemoglobin A1C      0 - 5.6 % 7.5 (H)       Denies CP, SOB, abdominal pain, polyuria, polydipsia, vision changes, extremity numbness/parasthesias (except for slightly in hands R>L) or skin problems.  History of bilateral carpal tunnel syndrome with right side worse  "than left.  Previously underwent EMG study on 4/7/2021.  Report reviewed and will be scanned into the chart.  Occasionally using a wrist brace at night which helps her symptoms.  Patient states symptoms are not to the point that she wishes to consider surgical treatment at this time  Diet had worsened over the last few months as patient was helping care for someone who often ate sweet and sugary foods in that person and his wife would often offer those to the patient which she accepted.  Patient normally does not have food like that in her home.  Patient has discontinued those type of foods and were blood sugars were previously 149-165 in the morning, now they are generally .  Patient has not been checking blood sugars other times of the day  On tramadol 1 tablet twice a day for chronic back pain.  Also has occasional arthritis type pains in other joints.  Denies constipation, increased fatigue with medication.  Pain generally controlled but sometimes has flares  Due for resigning of controlled substance agreement and urine drug screen annual. Mood good. PHQ and SHARAD completed and reviewed  Patient seen with her daughter today  Weight up 5 pounds in the last year though down a pound from last visit  Occasional itching in the rectal area.  Denies any blood in her stools  [place]      Additional ROS:   Constitutional, HEENT, Cardiovascular, Pulmonary, GI and , Neuro, MSK and Psych review of systems/symptoms are otherwise negative or unchanged from previous, except as noted above.      OBJECTIVE:  /74   Pulse 69   Temp 97.4  F (36.3  C) (Temporal)   Resp 16   Ht 1.575 m (5' 2\")   Wt 87.1 kg (192 lb)   SpO2 96%   BMI 35.12 kg/m     Estimated body mass index is 35.12 kg/m  as calculated from the following:    Height as of this encounter: 1.575 m (5' 2\").    Weight as of this encounter: 87.1 kg (192 lb).     Neck: no adenopathy. Thyroid normal to palpation. No bruits  Pulm: Lungs clear to " auscultation   CV: Regular rates and rhythm  GI: Soft, obese, nontender, Normal active bowel sounds, No hepatosplenomegaly or masses palpable  Ext: Peripheral pulses intact. No edema. DIP and PIP joints hands with osteoarthritis changes  Neuro: Normal strength and tone, sensory exam grossly normal except slight reduced light touch sensation bilateral median nerve distribution (right worse than left)  Back: Tenderness to palpation right paralumbar area. Neg SLRT bilaterally.     Rectal: Nontender external hemorrhoidal skin tag present.  Stool guaiac negative.  No rectal mass palpable with EDEN

## 2021-06-08 NOTE — LETTER
Opioid / Opioid Plus Controlled Substance Agreement    This is an agreement between you and your provider about the safe and appropriate use of controlled substance/opioids prescribed by your care team. Controlled substances are medicines that can cause physical and mental dependence (abuse).    There are strict laws about having and using these medicines. We here at [facility] are committing to working with you in your efforts to get better. To support you in this work, we ll help you schedule regular office appointments for medicine refills. If we must cancel or change your appointment for any reason, we ll make sure you have enough medicine to last until your next appointment.     As a Provider, I will:    Listen carefully to your concerns and treat you with respect.     Recommend a treatment plan that I believe is in your best interest. This plan may involve therapies other than opioid pain medication.     Talk with you often about the possible benefits, and the risk of harm of any medicine that we prescribe for you.     Provide a plan on how to taper (discontinue or go off) using this medicine if the decision is made to stop its use.    As a Patient, I understand that opioid(s):     Are a controlled substance prescribed by my care team to help me function or work and manage my condition(s).     Are strong medicines and can cause serious side effects such as:    Drowsiness, which can seriously affect my driving ability    A lower breathing rate, enough to cause death    Harm to my thinking ability     Depression     Abuse of and addiction to this medicine    Need to be taken exactly as prescribed. Combining opioids with certain medicines or chemicals (such as illegal drugs, sedatives, sleeping pills, and benzodiazepines) can be dangerous or even fatal. If I stop opioids suddenly, I may have severe withdrawal symptoms.    Do not work for all types of pain nor for all patients. If they re not helpful, I may  be asked to stop them.        The risks, benefits and side effects of these medicine(s) were explained to me. I agree that:  1. I will take part in other treatments as advised by my care team. This may be psychiatry or counseling, physical therapy, behavioral therapy, group treatment or a referral to a specialist.     2. I will keep all my appointments. I understand that this is part of the monitoring of opioids. My care team may require an office visit for EVERY opioid/controlled substance refill. If I miss appointments or don t follow instructions, my care team may stop my medicine.    3. I will take my medicines as prescribed. I will not change the dose or schedule unless my care team tells me to. There will be no refills if I run out early.     4. I may be asked to come to the clinic and complete a urine drug test or complete a pill count at any time. If I don t give a urine sample or participate in a pill count, the care team may stop my medicine.    5. I will only receive prescriptions from this clinic for chronic pain. If I am treated by another provider for acute pain issues, I will tell them that I am taking opioid pain medication for chronic pain and that I have a treatment agreement with this provider. I will inform my [facility] care team within one business day if I am given a prescription for any pain medication by another healthcare provider. My [facility] care team can contact other providers and pharmacists about my use of any medicines.    6. It is up to me to make sure that I don t run out of my medicines on weekends or holidays. If my care team is willing to refill my opioid prescription without a visit, I must request refills only during office hours. Refills may take up to 3 business days to process. I will use one pharmacy to fill all my opioid and other controlled substance prescriptions. I will notify the clinic about any changes to my insurance or medication  availability.    7. I am responsible for my prescriptions. If the medicine/prescription is lost, stolen or destroyed, it will not be replaced. I also agree not to share controlled substance medicines with anyone.    8. I am aware I should not use any illegal or recreational drugs. I agree not to drink alcohol unless my care team says I can.       9. If I enroll in the [place] [facility] Cann[facility] program, I will tell my care team prior to my next refill.     10. I will tell my care team right away if I become pregnant, have a new medical problem treated outside of my regular clinic, or have a change in my medications.    11. I understand that this medicine can affect my thinking, judgment and reaction time. Alcohol and drugs affect the brain and body, which can affect the safety of my driving. Being under the influence of alcohol or drugs can affect my decision-making, behaviors, personal safety, and the safety of others. Driving while impaired (DWI) can occur if a person is driving, operating, or in physical control of a car, motorcycle, boat, snowmobile, ATV, motorbike, off-road vehicle, or any other motor vehicle ([place] Statute 169A.20). I understand the risk if I choose to drive or operate any vehicle or machinery.    I understand that if I do not follow any of the conditions above, my prescriptions or treatment may be stopped or changed.          Opioids  What You Need to Know    What are opioids?   Opioids are pain medicines that must be prescribed by a doctor. They are also known as narcotics.     Examples are:   1. morphine (MS Contin, Casi)  2. oxycodone (Oxycontin)  3. oxycodone and acetaminophen (Percocet)  4. hydrocodone and acetaminophen (Vicodin, Norco)   5. fentanyl patch (Duragesic)   6. hydromorphone (Dilaudid)   7. methadone  8. codeine (Tylenol #3)     What do opioids do well?   Opioids are best for severe short-term pain such as after a surgery or injury. They may work well for cancer pain. They may  help some people with long-lasting (chronic) pain.     What do opioids NOT do well?   Opioids never get rid of pain entirely, and they don t work well for most patients with chronic pain. Opioids don t reduce swelling, one of the causes of pain.                                    Other ways to manage chronic pain and improve function include:       Treat the health problem that may be causing pain    Anti-inflammation medicines, which reduce swelling and tenderness, such as ibuprofen (Advil, Motrin) or naproxen (Aleve)    Acetaminophen (Tylenol)    Antidepressants and anti-seizure medicines, especially for nerve pain    Topical treatments such as patches or creams    Injections or nerve blocks    Chiropractic or osteopathic treatment    Acupuncture, massage, deep breathing, meditation, visual imagery, aromatherapy    Use heat or ice at the pain site    Physical therapy     Exercise    Stop smoking    Take part in therapy       Risks and side effects     Talk to your doctor before you start or decide to keep taking opioids. Possible side effects include:      Lowering your breathing rate enough to cause death    Overdose, including death, especially if taking higher than prescribed doses    Worse depression symptoms; less pleasure in things you usually enjoy    Feeling tired or sluggish    Slower thoughts or cloudy thinking    Being more sensitive to pain over time; pain is harder to control    Trouble sleeping or restless sleep    Changes in hormone levels (for example, less testosterone)    Changes in sex drive or ability to have sex    Constipation    Unsafe driving    Itching and sweating    Dizziness    Nausea, throwing up and dry mouth    What else should I know about opioids?    Opioids may lead to dependence, tolerance, or addiction.      Dependence means that if you stop or reduce the medicine too quickly, you will have withdrawal symptoms. These include loose poop (diarrhea), jitters, flu-like symptoms,  nervousness and tremors. Dependence is not the same as addiction.                       Tolerance means needing higher doses over time to get the same effect. This may increase the chance of serious side effects.      Addiction is when people improperly use a substance that harms their body, their mind or their relations with others. Use of opiates can cause a relapse of addiction if you have a history of drug or alcohol abuse.      People who have used opioids for a long time may have a lower quality of life, worse depression, higher levels of pain and more visits to doctors.    You can overdose on opioids. Take these steps to lower your risk of overdose:    1. Recognize the signs:  Signs of overdose include decrease or loss of consciousness (blackout), slowed breathing, trouble waking up and blue lips. If someone is worried about overdose, they should call 911.    2. Talk to your doctor about Narcan (naloxone).   If you are at risk for overdose, you may be given a prescription for Narcan. This medicine very quickly reverses the effects of opioids.   If you overdose, a friend or family member can give you Narcan while waiting for the ambulance. They need to know the signs of overdose and how to give Narcan.     3. Don't use alcohol or street drugs.   Taking them with opioids can cause death.    4. Do not take any of these medicines unless your doctor says it s OK. Taking these with opioids can cause death:    Benzodiazepines, such as lorazepam (Ativan), alprazolam (Xanax) or diazepam (Valium)    Muscle relaxers, such as cyclobenzaprine (Flexeril)    Sleeping pills like zolpidem (Ambien)     Other opioids      How to keep you and other people safe while taking opioids:    1. Never share your opioids with others.  Opioid medicines are regulated by the Drug Enforcement Agency (MARLEE). Selling or sharing medications is a criminal act.    2. Be sure to store opioids in a secure place, locked up if possible. Young children  can easily swallow them and overdose.    3. When you are traveling with your medicines, keep them in the original bottles. If you use a pill box, be sure you also carry a copy of your medicine list from your clinic or pharmacy.    4. Safe disposal of opioids    Most pharmacies have places to get rid of medicine, called disposal kiosks. Medicine disposal options are also available in every [facility]. Search your county and  medication disposal  to find more options. You can find more details at:  [email or web address]     I agree that my provider, clinic care team, and pharmacy may work with any city, state or federal law enforcement agency that investigates the misuse, sale, or other diversion of my controlled medicine. I will allow my provider to discuss my care with, or share a copy of, this agreement with any other treating provider, pharmacy or emergency room where I receive care.    I have read this agreement and have asked questions about anything I did not understand.    _______________________________________________________  Patient Signature - Essie Huddleston _____________________                   Date     _______________________________________________________  Provider Signature - Luigi Driscoll MD   _____________________                   Date     _______________________________________________________  Witness Signature (required if provider not present while patient signing)   _____________________                   Date

## 2021-06-08 NOTE — PATIENT INSTRUCTIONS
Wrist brace at night and when weeding. If wrist/hand pain worsens despite this , then contact clinic and will refer to [facility] specialist  Celebrex 100mg capsule, 1 capsule twice a day with foods for pain  Continue other meds.  Call  [phone] or use [employer] to schedule a future lab appointment  non-fasting in 3 months for diabetes.  See me a few days later either in clinic or with virtual visit to review results, blood sugars and chronic pain management  Check blood sugars twice a day (before breakfast and bedtime) for 4 days prior to the appointment with me, write them down and have them available to review with the appointment   May use Tucks pads after wiping with toilet paper for bowel movements as needed for itching in rectal area. Get over the counter   Urine drug screen   Controlled substance agreement signed   May fill tramadol prescription refill starting  6/19/21  Continue to reduce calorie/carbohydrate (sugar, bread, potato, pasta, rice, alcohol etc)  intake in diet and increase color on your plate with fruits and vegetables to improve blood sugar control and for weight loss.. Increase  frequency of walking or other aerobic exercise as able

## 2021-06-09 PROBLEM — E66.01 SEVERE OBESITY WITH BODY MASS INDEX (BMI) OF 35.0 TO 39.9 WITH COMORBIDITY (H): Status: ACTIVE | Noted: 2021-06-09

## 2021-06-09 PROBLEM — G56.03 BILATERAL CARPAL TUNNEL SYNDROME: Status: ACTIVE | Noted: 2021-06-09

## 2021-06-09 PROBLEM — F11.90 CHRONIC, CONTINUOUS USE OF OPIOIDS: Status: ACTIVE | Noted: 2021-06-09

## 2021-06-09 RX ORDER — SIMVASTATIN 20 MG
20 TABLET ORAL AT BEDTIME
Qty: 90 TABLET | Refills: 3 | Status: SHIPPED | OUTPATIENT
Start: 2021-06-09 | End: 2022-03-05

## 2021-06-09 RX ORDER — GLIPIZIDE 5 MG/1
TABLET ORAL
Qty: 180 TABLET | Refills: 3 | Status: SHIPPED | OUTPATIENT
Start: 2021-06-09 | End: 2022-03-05

## 2021-06-09 RX ORDER — TRAMADOL HYDROCHLORIDE 50 MG/1
50 TABLET ORAL 2 TIMES DAILY
Qty: 60 TABLET | Refills: 2 | Status: SHIPPED | OUTPATIENT
Start: 2021-06-09 | End: 2021-08-20

## 2021-06-09 ASSESSMENT — ANXIETY QUESTIONNAIRES: GAD7 TOTAL SCORE: 1

## 2021-06-10 LAB
AMPHET UR CFM-MCNC: 97 NG/ML
AMPHET/CREAT UR: 220 NG/MG{CREAT}
CREAT UR-MCNC: 44 MG/DL
GABAPENTIN UR QL CFM: PRESENT
METHAMPHET UR CFM-MCNC: 182 NG/ML
METHAMPHET/CREAT UR: 414 NG/MG{CREAT}
N-NORTRAMADOL/CREAT UR CFM: ABNORMAL NG/MG{CREAT}
O-NORTRAMADOL UR CFM-MCNC: 8020 NG/ML
RPT COMMENT: ABNORMAL
TRAMADOL CTO UR CFM-MCNC: ABNORMAL NG/ML
TRAMADOL/CREAT UR: ABNORMAL NG/MG{CREAT}

## 2021-06-14 ENCOUNTER — THERAPY VISIT (OUTPATIENT)
Dept: PHYSICAL THERAPY | Facility: CLINIC | Age: 76
End: 2021-06-14
Payer: COMMERCIAL

## 2021-06-14 DIAGNOSIS — M54.41 ACUTE RIGHT-SIDED LOW BACK PAIN WITH RIGHT-SIDED SCIATICA: Primary | ICD-10-CM

## 2021-06-14 PROCEDURE — 97110 THERAPEUTIC EXERCISES: CPT | Mod: GP | Performed by: PHYSICAL THERAPIST

## 2021-06-14 PROCEDURE — 97530 THERAPEUTIC ACTIVITIES: CPT | Mod: GP | Performed by: PHYSICAL THERAPIST

## 2021-06-16 ENCOUNTER — THERAPY VISIT (OUTPATIENT)
Dept: PHYSICAL THERAPY | Facility: CLINIC | Age: 76
End: 2021-06-16
Payer: COMMERCIAL

## 2021-06-16 DIAGNOSIS — M54.41 ACUTE RIGHT-SIDED LOW BACK PAIN WITH RIGHT-SIDED SCIATICA: Primary | ICD-10-CM

## 2021-06-16 PROCEDURE — 97530 THERAPEUTIC ACTIVITIES: CPT | Mod: GP | Performed by: PHYSICAL THERAPIST

## 2021-06-16 PROCEDURE — 97110 THERAPEUTIC EXERCISES: CPT | Mod: GP | Performed by: PHYSICAL THERAPIST

## 2021-06-16 PROCEDURE — 97112 NEUROMUSCULAR REEDUCATION: CPT | Mod: GP | Performed by: PHYSICAL THERAPIST

## 2021-06-23 ENCOUNTER — TELEPHONE (OUTPATIENT)
Dept: INTERNAL MEDICINE | Facility: CLINIC | Age: 76
End: 2021-06-23

## 2021-06-23 NOTE — TELEPHONE ENCOUNTER
Reason for Call:  Other     Detailed comments: pt is having a dental procedure on Saturday 06/26/21 and the dental office needs the medication list for pt. The dental clinic wants the medication list to be emailed to [email or web address]. please email the med list for pt.    Phone Number Patient can be reached at: Cell number on file:    Telephone Information:   Mobile [phone]       Best Time: anytime    Can we leave a detailed message on this number? YES    Call taken on 6/23/2021 at 11:54 AM by CYNTHIA Santana

## 2021-08-16 DIAGNOSIS — G89.29 CHRONIC RIGHT-SIDED LOW BACK PAIN WITHOUT SCIATICA: ICD-10-CM

## 2021-08-16 DIAGNOSIS — F11.90 CHRONIC, CONTINUOUS USE OF OPIOIDS: ICD-10-CM

## 2021-08-16 DIAGNOSIS — M54.50 CHRONIC RIGHT-SIDED LOW BACK PAIN WITHOUT SCIATICA: ICD-10-CM

## 2021-08-17 RX ORDER — TRAMADOL HYDROCHLORIDE 50 MG/1
TABLET ORAL
Qty: 60 TABLET | Refills: 0 | OUTPATIENT
Start: 2021-08-17

## 2021-08-20 DIAGNOSIS — F11.90 CHRONIC, CONTINUOUS USE OF OPIOIDS: ICD-10-CM

## 2021-08-20 DIAGNOSIS — G89.29 CHRONIC RIGHT-SIDED LOW BACK PAIN WITHOUT SCIATICA: ICD-10-CM

## 2021-08-20 DIAGNOSIS — M54.50 CHRONIC RIGHT-SIDED LOW BACK PAIN WITHOUT SCIATICA: ICD-10-CM

## 2021-08-20 RX ORDER — TRAMADOL HYDROCHLORIDE 50 MG/1
TABLET ORAL
Qty: 60 TABLET | Refills: 0 | Status: SHIPPED | OUTPATIENT
Start: 2021-08-20 | End: 2021-09-16

## 2021-08-20 NOTE — TELEPHONE ENCOUNTER
Routing refill request to provider for review/approval because:  Drug not on the [facility] refill protocol     Catherine Banks, MSN, RN   [facility]

## 2021-08-20 NOTE — TELEPHONE ENCOUNTER
Reviewed [place]  and spoke with pharmacy. Pt had filled [place] 6[place]/21 [place] and [place]19[place] but our [place]  did not show [place]. [place]old [place]ig  RF and RF done to [place] [facility] and daughter/pt updated. Will see me in 1 month

## 2021-08-25 ENCOUNTER — THERAPY VISIT (OUTPATIENT)
Dept: PHYSICAL THERAPY | Facility: CLINIC | Age: 76
End: 2021-08-25
Payer: COMMERCIAL

## 2021-08-25 DIAGNOSIS — M54.41 ACUTE RIGHT-SIDED LOW BACK PAIN WITH RIGHT-SIDED SCIATICA: Primary | ICD-10-CM

## 2021-08-25 PROCEDURE — 97110 THERAPEUTIC EXERCISES: CPT | Mod: GP | Performed by: PHYSICAL THERAPIST

## 2021-08-25 PROCEDURE — 97530 THERAPEUTIC ACTIVITIES: CPT | Mod: GP | Performed by: PHYSICAL THERAPIST

## 2021-08-25 PROCEDURE — 97112 NEUROMUSCULAR REEDUCATION: CPT | Mod: GP | Performed by: PHYSICAL THERAPIST

## 2021-08-25 NOTE — PROGRESS NOTES
Subjective:  HPI  Physical Exam                    Objective:  System    Physical Exam    General     ROS    Assessment/Plan:    PROGRESS  REPORT    Progress reporting period is from 6-2-21 to 8-25-21.       SUBJECTIVE  Subjective changes noted by patient:  Treatment interrupted d/t death of her daughter and she had to travel to [facility]. She had a 2 day plane ride to/from and the R LBP spread to L LB and L leg to ankle. Worse with lying on L side, unless she bends left knee, worse sitting > 1 hour, walking >30 minutes. Doesn't need a cane. Overall is better than at [place].     Current Pain level: 5/10.      Initial Pain level: 6/10.   Changes in function:  Yes (See Goal flowsheet attached for changes in current functional level)  Adverse reaction to treatment or activity: None    OBJECTIVE  Changes noted in objective findings:  Yes,   LAROM: flex=hands to toes (lower leg at IE),   ext=75% (improved from 25% at IE),   IIP=660% (50% at IE).    Pt sits in slightly slouched position but can correct with cues.    ASSESSMENT/PLAN  Updated problem list and treatment plan: Diagnosis 1:  LBP  Pain -  manual therapy, self management, education, directional preference exercise and home program  Decreased ROM/flexibility - manual therapy and therapeutic exercise  Decreased function - therapeutic activities  Impaired posture - neuro re-education  STG/LTGs have been met or progress has been made towards goals:  Yes (See Goal flow sheet completed today.)  Assessment of Progress: The patient's condition is improving.  Patient is meeting short term goals and is progressing towards long term goals.  Self Management Plans:  Patient has been instructed in a home treatment program.  Patient  has been instructed in self management of symptoms.  I have re-evaluated this patient and find that the nature, scope, duration and intensity of the therapy is appropriate for the medical condition of the patient.  Essie continues to require the  following intervention to meet STG and LTG's:  PT    Recommendations:  This patient would benefit from continued therapy.     Frequency:  2 X a month, once daily  Duration:  for 4 weeks    Please refer to the daily flowsheet for treatment today, total treatment time and time spent performing 1:1 timed codes.

## 2021-09-14 DIAGNOSIS — F11.90 CHRONIC, CONTINUOUS USE OF OPIOIDS: ICD-10-CM

## 2021-09-14 DIAGNOSIS — M54.50 CHRONIC RIGHT-SIDED LOW BACK PAIN WITHOUT SCIATICA: ICD-10-CM

## 2021-09-14 DIAGNOSIS — E11.21 TYPE 2 DIABETES MELLITUS WITH DIABETIC NEPHROPATHY, WITH LONG-TERM CURRENT USE OF INSULIN (H): ICD-10-CM

## 2021-09-14 DIAGNOSIS — Z79.4 TYPE 2 DIABETES MELLITUS WITH DIABETIC NEPHROPATHY, WITH LONG-TERM CURRENT USE OF INSULIN (H): ICD-10-CM

## 2021-09-14 DIAGNOSIS — G89.29 CHRONIC RIGHT-SIDED LOW BACK PAIN WITHOUT SCIATICA: ICD-10-CM

## 2021-09-16 RX ORDER — TRAMADOL HYDROCHLORIDE 50 MG/1
TABLET ORAL
Qty: 60 TABLET | Refills: 0 | Status: SHIPPED | OUTPATIENT
Start: 2021-09-16 | End: 2021-10-13

## 2021-09-16 RX ORDER — INSULIN DETEMIR 100 [IU]/ML
INJECTION, SOLUTION SUBCUTANEOUS
Qty: 30 ML | Refills: 0 | Status: SHIPPED | OUTPATIENT
Start: 2021-09-16 | End: 2021-10-17

## 2021-09-16 NOTE — TELEPHONE ENCOUNTER
Routing refill request to provider for review/approval because:  Drug not on the [facility] refill protocol     Pending Prescriptions:                       Disp   Refills    traMADol (ULTRAM) 50 MG tablet [Pharmacy *60 tab*0            Sig: Take 1 tablet by mouth twice daily    Signed Prescriptions:                        Disp   Refills    LEVEMIR FLEXTOUCH 100 UNIT/ML pen          30 mL  0        Sig: INJECT 35 UNITS SUBCUTANEOUSLY ONCE DAILY IN THE           EVENING  Authorizing Provider: FREDY WRIGHT  Ordering User: CIARRA GREENWOOD    Last Written Prescription Date:  08/20/21  Last Fill Quantity: 60,  # refills: 0   Last office visit: 6/8/2021 with prescribing provider:   Future Office Visit:  none      Ciarra Greenwood RN  [facility]

## 2021-09-17 ENCOUNTER — LAB (OUTPATIENT)
Dept: LAB | Facility: CLINIC | Age: 76
End: 2021-09-17
Payer: COMMERCIAL

## 2021-09-17 DIAGNOSIS — Z79.4 TYPE 2 DIABETES MELLITUS WITH DIABETIC NEPHROPATHY, WITH LONG-TERM CURRENT USE OF INSULIN (H): ICD-10-CM

## 2021-09-17 DIAGNOSIS — E11.21 TYPE 2 DIABETES MELLITUS WITH DIABETIC NEPHROPATHY, WITH LONG-TERM CURRENT USE OF INSULIN (H): ICD-10-CM

## 2021-09-17 LAB — HBA1C MFR BLD: 7.7 % (ref 0–5.6)

## 2021-09-17 PROCEDURE — 83036 HEMOGLOBIN GLYCOSYLATED A1C: CPT

## 2021-09-17 PROCEDURE — 36415 COLL VENOUS BLD VENIPUNCTURE: CPT

## 2021-09-17 NOTE — TELEPHONE ENCOUNTER
Has lab appointment scheduled for tomorrow.  Patient last seen 3 months ago.  Due for follow-up appointment every 3 months with regards to narcotic pain medication use for chronic pain.  Originally was scheduled to see 9/20/21 but I will now be out of clinic on that date so appointment was canceled.  Schedule follow-up appointment with me in clinic with next available appointment in clinic face to face if pt wishes or may be sooner with virtual visit.  Tramadol prescription refill approved to be filled starting 9/19/2021. [place]  reviewed.

## 2021-10-02 ENCOUNTER — HEALTH MAINTENANCE LETTER (OUTPATIENT)
Age: 76
End: 2021-10-02

## 2021-10-05 ENCOUNTER — VIRTUAL VISIT (OUTPATIENT)
Dept: INTERNAL MEDICINE | Facility: CLINIC | Age: 76
End: 2021-10-05
Payer: COMMERCIAL

## 2021-10-05 DIAGNOSIS — Z79.4 TYPE 2 DIABETES MELLITUS WITH DIABETIC NEPHROPATHY, WITH LONG-TERM CURRENT USE OF INSULIN (H): Primary | ICD-10-CM

## 2021-10-05 DIAGNOSIS — I10 ESSENTIAL HYPERTENSION WITH GOAL BLOOD PRESSURE LESS THAN 130/80: ICD-10-CM

## 2021-10-05 DIAGNOSIS — E11.21 TYPE 2 DIABETES MELLITUS WITH DIABETIC NEPHROPATHY, WITH LONG-TERM CURRENT USE OF INSULIN (H): Primary | ICD-10-CM

## 2021-10-05 DIAGNOSIS — E78.5 HYPERLIPIDEMIA LDL GOAL <100: ICD-10-CM

## 2021-10-05 PROCEDURE — 99213 OFFICE O/P EST LOW 20 MIN: CPT | Mod: 95 | Performed by: INTERNAL MEDICINE

## 2021-10-05 ASSESSMENT — PATIENT HEALTH QUESTIONNAIRE - PHQ9: SUM OF ALL RESPONSES TO PHQ QUESTIONS 1-9: 4

## 2021-10-05 NOTE — PROGRESS NOTES
Essie is a 76 year old who is being evaluated via a billable video visit.      How would you like to obtain your AVS? TapatapharAsk Ziggy  If the video visit is dropped, the invitation should be resent by: Other e-mail: GymRealm  Will anyone else be joining your video visit? No       ASSESSMENT:   1. Type 2 diabetes mellitus with diabetic nephropathy, with long-term current use of insulin (H)  A1c at goal less than 8 with age but worsened some.  Will continue current medications and patient to improve diabetic diet with reduction of carbohydrate intake.  Recheck A1c in 3 months per [facility] lab order protocol already signed    2. Essential hypertension with goal blood pressure less than 130/80  No home blood pressure readings to review.  Last blood pressure from June 2021 minimally above goal < 13/80.  Patient will continue current medication and try to improve walking or other exercise along with calorie/carbohydrate reduction as able for weight loss which will hopefully help blood pressure in addition    PLAN:  Continue current meds  Call  [phone] or use itzat to schedule a future lab appointment  non-fasting in 3 months to follow-up on diabetic control with repeat A1c lab.   Reduce calorie/carbohydrate (sugar, bread, potato, pasta, rice, alcohol etc)  intake in diet.  Increase color on your plate with fruits and vegetables. Increase  frequency of walking or other aerobic exercise as able (goal is daily)        Video-Visit Details    Type of service:  Video Visit    Video Start Time: 3:05pm    Video End Time:3:20pm    Originating Location (pt. Location): Home    Distant Location (provider location):  [facility]     Platform used for Video Visit: Mary Driscoll MD  Internal Medicine Department  [facility]  Internal Medicine Department      (Chart documentation was completed, in part, with WeSpire voice-recognition software. Even though reviewed, some  grammatical, spelling, and word errors may remain.)         Netta Fountain is a 76 year old who presents for the following health issues     HPI     Diabetes Follow-up    How often are you checking your blood sugar? Two times daily  Blood sugar testing frequency justification:  Per provider   What time of day are you checking your blood sugars (select all that apply)?  in the am and pm  Have you had any blood sugars above 200?  Yes once   Have you had any blood sugars below 70?  No    What symptoms do you notice when your blood sugar is low?  None    What concerns do you have today about your diabetes? None     Do you have any of these symptoms? (Select all that apply)  No numbness or tingling in feet.  No redness, sores or blisters on feet.  No complaints of excessive thirst.  No reports of blurry vision.  No significant changes to weight.    Have you had a diabetic eye exam in the last 12 months? No                   Hypertension Follow-up      Do you check your blood pressure regularly outside of the clinic? NO    Are you following a low salt diet? Yes    Are your blood pressures ever more than 140 on the top number (systolic) OR more   than 90 on the bottom number (diastolic), for example 140/90? N/A    BP Readings from Last 2 Encounters:   06/08/21 138/74   06/02/21 136/70        Most recent lab results reviewed with pt.         Component      Latest Ref Rng & Units 12/26/2019 7/23/2020 7/29/2020 1/26/2021   Sodium      133 - 144 mmol/L  140  139   Potassium      3.4 - 5.3 mmol/L  4.4  4.4   Chloride      94 - 109 mmol/L  107  105   Carbon Dioxide      20 - 32 mmol/L  28  31   Anion Gap      3 - 14 mmol/L  5  3   Glucose      70 - 99 mg/dL  87  70   Urea Nitrogen      7 - 30 mg/dL  9  22   Creatinine      0.52 - 1.04 mg/dL  0.63  0.95   GFR Estimate      >60 mL/min/1.73:m2  88  58 (L)   GFR Estimate If Black      >60 mL/min/1.73:m2  >90  68   Calcium      8.5 - 10.1 mg/dL  9.0  9.8   Bilirubin Total       0.2 - 1.3 mg/dL  0.3     Albumin      3.4 - 5.0 g/dL  3.8     Protein Total      6.8 - 8.8 g/dL  7.3     Alkaline Phosphatase      40 - 150 U/L  63     ALT      0 - 50 U/L  19     AST      0 - 45 U/L  10     Cholesterol      <200 mg/dL  127     Triglycerides      <150 mg/dL  141     HDL Cholesterol      >49 mg/dL  68     LDL Cholesterol Calculated      <100 mg/dL  31     Non HDL Cholesterol      <130 mg/dL  59     Creatinine Urine      mg/dL  48     Albumin Urine mg/L      mg/L  5     Albumin Urine mg/g Cr      0 - 25 mg/g Cr  10.90     Hemoglobin A1C POCT      0 - 5.6 % 7.6 (H)  6.8 (H) 7.3 (H)   Hemoglobin A1C      0.0 - 5.6 %         Component      Latest Ref Rng & Units 6/2/2021 9/17/2021   Sodium      133 - 144 mmol/L 140    Potassium      3.4 - 5.3 mmol/L 4.4    Chloride      94 - 109 mmol/L 106    Carbon Dioxide      20 - 32 mmol/L 32    Anion Gap      3 - 14 mmol/L 2 (L)    Glucose      70 - 99 mg/dL 107 (H)    Urea Nitrogen      7 - 30 mg/dL 14    Creatinine      0.52 - 1.04 mg/dL 0.74    GFR Estimate      >60 mL/min/1.73:m2 80    GFR Estimate If Black      >60 mL/min/1.73:m2 >90    Calcium      8.5 - 10.1 mg/dL 9.1    Bilirubin Total      0.2 - 1.3 mg/dL 0.4    Albumin      3.4 - 5.0 g/dL 4.1    Protein Total      6.8 - 8.8 g/dL 7.7    Alkaline Phosphatase      40 - 150 U/L 68    ALT      0 - 50 U/L 23    AST      0 - 45 U/L 13    Cholesterol      <200 mg/dL 144    Triglycerides      <150 mg/dL 109    HDL Cholesterol      >49 mg/dL 72    LDL Cholesterol Calculated      <100 mg/dL 50    Non HDL Cholesterol      <130 mg/dL 72    Creatinine Urine      mg/dL 28    Albumin Urine mg/L      mg/L <5    Albumin Urine mg/g Cr      0 - 25 mg/g Cr Unable to calculate due to low value    Hemoglobin A1C POCT      0 - 5.6 % 7.5 (H)    Hemoglobin A1C      0.0 - 5.6 %  7.7 (H)     Denies CP, SOB, abdominal pain, polyuria, polydipsia, vision changes, extremity numbness/parasthesias or skin problems.  Stable chronic knee  pain with use of Celebrex and tramadol both BID  Only checking blood sugars once a day in the morning.  Generally 100-140.  Patient states her diet has worsened some compared to previous.  Not exercising recently.  Not checking on the times a day.  Remains on once a day long-acting insulin along with metformin and glipizide     Additional ROS:   Constitutional, HEENT, Cardiovascular, Pulmonary, GI and , Neuro, MSK and Psych review of systems/symptoms are otherwise negative or unchanged from previous, except as noted above.           Objective :  No vitals obtained today.  Last vitals from June 2021 reviewed     Physical Exam:  GENERAL:   alert and no distress  EYES: Eyes grossly normal to inspection, conjunctivae and sclerae normal  RESP: no audible wheeze, cough, or visible cyanosis.  No visible retractions or increased work of breathing.  Able to speak fully in complete sentences.  NEURO: Cranial nerves grossly intact, mentation intact and speech normal  PSYCH: mentation appears normal, affect normal/bright, judgement and insight intact, normal speech and appearance well-groomed

## 2021-10-11 DIAGNOSIS — G89.29 CHRONIC RIGHT-SIDED LOW BACK PAIN WITHOUT SCIATICA: ICD-10-CM

## 2021-10-11 DIAGNOSIS — R00.2 PALPITATIONS: ICD-10-CM

## 2021-10-11 DIAGNOSIS — F11.90 CHRONIC, CONTINUOUS USE OF OPIOIDS: ICD-10-CM

## 2021-10-11 DIAGNOSIS — I10 ESSENTIAL HYPERTENSION WITH GOAL BLOOD PRESSURE LESS THAN 130/80: ICD-10-CM

## 2021-10-11 DIAGNOSIS — M54.50 CHRONIC RIGHT-SIDED LOW BACK PAIN WITHOUT SCIATICA: ICD-10-CM

## 2021-10-12 RX ORDER — BISOPROLOL FUMARATE 5 MG/1
TABLET, FILM COATED ORAL
Qty: 60 TABLET | Refills: 0 | Status: SHIPPED | OUTPATIENT
Start: 2021-10-12 | End: 2021-12-16

## 2021-10-13 RX ORDER — TRAMADOL HYDROCHLORIDE 50 MG/1
TABLET ORAL
Qty: 60 TABLET | Refills: 2 | Status: SHIPPED | OUTPATIENT
Start: 2021-10-13 | End: 2022-01-17

## 2021-10-13 RX ORDER — CELECOXIB 100 MG/1
CAPSULE ORAL
Qty: 180 CAPSULE | Refills: 1 | Status: SHIPPED | OUTPATIENT
Start: 2021-10-13 | End: 2022-03-05

## 2021-10-14 DIAGNOSIS — E11.21 TYPE 2 DIABETES MELLITUS WITH DIABETIC NEPHROPATHY, WITH LONG-TERM CURRENT USE OF INSULIN (H): ICD-10-CM

## 2021-10-14 DIAGNOSIS — Z79.4 TYPE 2 DIABETES MELLITUS WITH DIABETIC NEPHROPATHY, WITH LONG-TERM CURRENT USE OF INSULIN (H): ICD-10-CM

## 2021-10-17 RX ORDER — INSULIN DETEMIR 100 [IU]/ML
INJECTION, SOLUTION SUBCUTANEOUS
Qty: 45 ML | Refills: 3 | Status: SHIPPED | OUTPATIENT
Start: 2021-10-17 | End: 2022-03-05

## 2021-10-26 ENCOUNTER — OFFICE VISIT (OUTPATIENT)
Dept: URGENT CARE | Facility: URGENT CARE | Age: 76
End: 2021-10-26
Payer: COMMERCIAL

## 2021-10-26 VITALS
TEMPERATURE: 98.6 F | HEART RATE: 69 BPM | DIASTOLIC BLOOD PRESSURE: 78 MMHG | RESPIRATION RATE: 16 BRPM | BODY MASS INDEX: 35.12 KG/M2 | WEIGHT: 192 LBS | SYSTOLIC BLOOD PRESSURE: 157 MMHG | OXYGEN SATURATION: 100 %

## 2021-10-26 DIAGNOSIS — Z20.822 SUSPECTED COVID-19 VIRUS INFECTION: Primary | ICD-10-CM

## 2021-10-26 DIAGNOSIS — H10.31 ACUTE BACTERIAL CONJUNCTIVITIS OF RIGHT EYE: ICD-10-CM

## 2021-10-26 PROCEDURE — U0005 INFEC AGEN DETEC AMPLI PROBE: HCPCS | Performed by: PHYSICIAN ASSISTANT

## 2021-10-26 PROCEDURE — U0003 INFECTIOUS AGENT DETECTION BY NUCLEIC ACID (DNA OR RNA); SEVERE ACUTE RESPIRATORY SYNDROME CORONAVIRUS 2 (SARS-COV-2) (CORONAVIRUS DISEASE [COVID-19]), AMPLIFIED PROBE TECHNIQUE, MAKING USE OF HIGH THROUGHPUT TECHNOLOGIES AS DESCRIBED BY CMS-2020-01-R: HCPCS | Performed by: PHYSICIAN ASSISTANT

## 2021-10-26 PROCEDURE — 99214 OFFICE O/P EST MOD 30 MIN: CPT | Performed by: PHYSICIAN ASSISTANT

## 2021-10-26 RX ORDER — OFLOXACIN 3 MG/ML
1-2 SOLUTION/ DROPS OPHTHALMIC 4 TIMES DAILY
Qty: 10 ML | Refills: 0 | Status: SHIPPED | OUTPATIENT
Start: 2021-10-26 | End: 2021-11-02

## 2021-10-27 LAB — SARS-COV-2 RNA RESP QL NAA+PROBE: POSITIVE

## 2021-10-27 NOTE — PATIENT INSTRUCTIONS
Patient Education     Conjunctivitis Caused by Infection     Wash hands often to help prevent spreading infection.   Infections are caused by viruses or bacteria. Treatment includes keeping your eyes and hands clean. Your healthcare provider may prescribe eye drops and tell you to stay home from work or school if you re contagious. Untreated infections can be serious. It's important to see your provider for a diagnosis.   Viral infections  A cold, flu, or other virus can spread to your eyes. This causes a watery discharge. Your eyes may burn or itch and get red. Your eyelids may also be puffy and sore.   Treatment  Most viral infections go away on their own. Artificial tears and warm compresses can relieve symptoms. Your healthcare provider may also prescribe eye drops. A viral infection can be very contagious and spread quickly. To prevent this, wash your hands often. Use a separate tissue to wipe each eye. Don t touch your eyes or share bedding or towels. Use a new, clean washcloth every time you use one. Throw away eye cosmetics, especially mascara. Never use someone else's eye cosmetics. If you use contact lenses, follow your healthcare provider's instructions on proper lens care.    Bacterial infections  Bacterial infections often happen in one eye. There may be a watery or a thick discharge from the eye. These infections can cause serious damage to your eye if not treated promptly.   Treatment  Your healthcare provider may prescribe eye drops or ointment to kill the bacteria. Use the medicine for the number of days it is prescribed. Don't stop using it when the symptoms improve. Other tips:     Warm compresses can help keep the eyelids clean.    Wash your hands often to keep the bacteria from spreading.    Use a separate tissue to wipe each eye.    Don't touch your eyes or share bedding or towels.    Use a new, clean washcloth every time you use one.    Throw away eye cosmetics, especially mascara.    Never  use someone else's eye cosmetics.    If you use contact lenses, follow your healthcare provider's instructions on proper lens care.  Karoline last reviewed this educational content on 4/1/2020 2000-2021 [employer]. All rights reserved. This information is not intended as a substitute for professional medical care. Always follow your healthcare professional's instructions.

## 2021-10-29 NOTE — PROGRESS NOTES
"  Assessment & Plan     Suspected COVID-19 virus infection  Covid pending  Check my chart for results  - Symptomatic COVID-19 Virus (Coronavirus) by PCR Nose    Acute bacterial conjunctivitis of right eye  Ocuflox eye drops for infection  Referral to ophthalmology if symptoms fail to improve  - ofloxacin (OCUFLOX) 0.3 % ophthalmic solution; Place 1-2 drops into the right eye 4 times daily for 7 days  - Adult Eye Referral; Future    Review of external notes as documented elsewhere in note  40 minutes spent on the date of the encounter doing chart review, review of outside records, review of test results, patient visit, documentation and discussion with other provider(s)        BMI:   Estimated body mass index is 35.12 kg/m  as calculated from the following:    Height as of 6/8/21: 1.575 m (5' 2\").    Weight as of this encounter: 87.1 kg (192 lb).       CONSULTATION/REFERRAL to Ophthalmology    Jacob Cleaning PA-C  [facility] JAUN Fountain is a 76 year old who presents for the following health issues     HPI     Dry eyes  Eye irritation, drainage  Right eye discomfort    Review of Systems   Constitutional, HEENT, cardiovascular, pulmonary, gi and gu systems are negative, except as otherwise noted.      Objective    BP (!) 157/78   Pulse 69   Temp 98.6  F (37  C)   Resp 16   Wt 87.1 kg (192 lb)   SpO2 100%   BMI 35.12 kg/m    Body mass index is 35.12 kg/m .  Physical Exam   GENERAL: healthy, alert and no distress  EYES: conjunctiva/corneas- conjunctival injection OD  HENT: ear canals and TM's normal, nose and mouth without ulcers or lesions  NECK: no adenopathy, no asymmetry, masses, or scars and thyroid normal to palpation  MS: no gross musculoskeletal defects noted, no edema  SKIN: no suspicious lesions or rashes  NEURO: Normal strength and tone, mentation intact and speech normal    Results for orders placed or performed in visit on 10/26/21   Symptomatic COVID-19 Virus " (Coronavirus) by PCR Nose     Status: Abnormal    Specimen: Nose; Swab   Result Value Ref Range    SARS CoV2 PCR Positive (A) Negative    Narrative    Testing was performed using the OnStateert Xpress SARS-CoV-2 Assay on the  Cepheid Gene-Xpert Instrument Systems. Additional information about  this Emergency Use Authorization (EUA) assay can be found via the Lab  Guide. This test should be ordered for the detection of SARS-CoV-2 in  individuals who meet SARS-CoV-2 clinical and/or epidemiological  criteria. Test performance is unknown in asymptomatic patients. This  test is for in vitro diagnostic use under the FDA EUA for  laboratories certified under CLIA to perform high complexity testing.  This test has not been FDA cleared or approved. A negative result  does not rule out the presence of PCR inhibitors in the specimen or  target RNA in concentration below the limit of detection for the  assay. The possibility of a false negative should be considered if  the patient's recent exposure or clinical presentation suggests  COVID-19. This test was validated by the [facility]. This laboratory is certified under  the Clinical Laboratory Improvement Amendments of 1988 (CLIA-88) as  qualified to perform high complexity laboratory testing.

## 2021-11-12 ENCOUNTER — TRANSFERRED RECORDS (OUTPATIENT)
Dept: HEALTH INFORMATION MANAGEMENT | Facility: CLINIC | Age: 76
End: 2021-11-12
Payer: COMMERCIAL

## 2021-12-03 DIAGNOSIS — R09.81 NASAL CONGESTION: ICD-10-CM

## 2021-12-06 RX ORDER — FLUTICASONE PROPIONATE 50 MCG
SPRAY, SUSPENSION (ML) NASAL
Qty: 16 G | Refills: 1 | Status: SHIPPED | OUTPATIENT
Start: 2021-12-06 | End: 2022-03-10

## 2021-12-14 DIAGNOSIS — R00.2 PALPITATIONS: ICD-10-CM

## 2021-12-14 DIAGNOSIS — I10 ESSENTIAL HYPERTENSION WITH GOAL BLOOD PRESSURE LESS THAN 130/80: ICD-10-CM

## 2021-12-16 RX ORDER — AMLODIPINE BESYLATE 5 MG/1
TABLET ORAL
Qty: 90 TABLET | Refills: 1 | Status: SHIPPED | OUTPATIENT
Start: 2021-12-16 | End: 2022-03-05

## 2021-12-16 RX ORDER — BISOPROLOL FUMARATE 5 MG/1
TABLET, FILM COATED ORAL
Qty: 90 TABLET | Refills: 1 | Status: SHIPPED | OUTPATIENT
Start: 2021-12-16 | End: 2022-03-05

## 2021-12-16 NOTE — TELEPHONE ENCOUNTER
Routing refill request to provider for review/approval because:  Labs out of range:    BP Readings from Last 3 Encounters:   10/26/21 (!) 157/78   06/08/21 138/74   06/02/21 136/70       Toshia Pugh RN

## 2021-12-17 NOTE — TELEPHONE ENCOUNTER
Blood pressure well controlled in June 2021.  Elevated in October but that was when patient was seen for URI in urgent care.  Blood pressure medications refilled

## 2022-01-01 ENCOUNTER — TRANSFERRED RECORDS (OUTPATIENT)
Dept: MULTI SPECIALTY CLINIC | Facility: CLINIC | Age: 77
End: 2022-01-01
Payer: COMMERCIAL

## 2022-01-01 LAB — RETINOPATHY: NORMAL

## 2022-01-11 DIAGNOSIS — M54.50 CHRONIC RIGHT-SIDED LOW BACK PAIN WITHOUT SCIATICA: ICD-10-CM

## 2022-01-11 DIAGNOSIS — M54.41 CHRONIC RIGHT-SIDED LOW BACK PAIN WITH RIGHT-SIDED SCIATICA: ICD-10-CM

## 2022-01-11 DIAGNOSIS — F11.90 CHRONIC, CONTINUOUS USE OF OPIOIDS: ICD-10-CM

## 2022-01-11 DIAGNOSIS — G89.29 CHRONIC RIGHT-SIDED LOW BACK PAIN WITHOUT SCIATICA: ICD-10-CM

## 2022-01-11 DIAGNOSIS — G89.29 CHRONIC RIGHT-SIDED LOW BACK PAIN WITH RIGHT-SIDED SCIATICA: ICD-10-CM

## 2022-01-12 NOTE — TELEPHONE ENCOUNTER
Routing refill request to provider for review/approval because:  Drug not on the [facility] refill protocol   Toshia Pugh RN

## 2022-01-14 ENCOUNTER — TELEPHONE (OUTPATIENT)
Dept: SLEEP MEDICINE | Facility: CLINIC | Age: 77
End: 2022-01-14
Payer: COMMERCIAL

## 2022-01-17 RX ORDER — GABAPENTIN 300 MG/1
CAPSULE ORAL
Qty: 270 CAPSULE | Refills: 0 | Status: SHIPPED | OUTPATIENT
Start: 2022-01-17 | End: 2022-03-05

## 2022-01-17 RX ORDER — TRAMADOL HYDROCHLORIDE 50 MG/1
TABLET ORAL
Qty: 60 TABLET | Refills: 0 | Status: SHIPPED | OUTPATIENT
Start: 2022-01-17 | End: 2022-02-16

## 2022-01-17 NOTE — TELEPHONE ENCOUNTER
Spoke with daughter Candis, patient is experiencing sores in the mouth, she thinks it may be from the mandibular device. Patient would like to discuss Inspire with the sleep provider. Patient has been scheduled for a follow up appointment.

## 2022-01-18 ENCOUNTER — VIRTUAL VISIT (OUTPATIENT)
Dept: SLEEP MEDICINE | Facility: CLINIC | Age: 77
End: 2022-01-18
Payer: COMMERCIAL

## 2022-01-18 VITALS — WEIGHT: 190 LBS | HEIGHT: 62 IN | BODY MASS INDEX: 34.96 KG/M2

## 2022-01-18 DIAGNOSIS — G47.33 OSA (OBSTRUCTIVE SLEEP APNEA): Primary | ICD-10-CM

## 2022-01-18 DIAGNOSIS — G47.34 NOCTURNAL HYPOXEMIA: ICD-10-CM

## 2022-01-18 DIAGNOSIS — E66.811 OBESITY (BMI 30.0-34.9): ICD-10-CM

## 2022-01-18 PROCEDURE — 99214 OFFICE O/P EST MOD 30 MIN: CPT | Mod: 95 | Performed by: PHYSICIAN ASSISTANT

## 2022-01-18 ASSESSMENT — MIFFLIN-ST. JEOR: SCORE: 1310.08

## 2022-01-18 NOTE — PROGRESS NOTES
Essie is a 76 year old who is being evaluated via a billable video visit.      How would you like to obtain your AVS? Mail a copy  If the video visit is dropped, the invitation should be resent by: Text to cell phone: [phone]  Will anyone else be joining your video visit? Yes daughter      Video-Visit Details    Type of service:  Video Visit  Video Start Time: 10:34AM    Video End Time:10:57AM    Originating Location (pt. Location): Home    Distant Location (provider location):  [facility]     Platform used for Video Visit: Smeet

## 2022-01-18 NOTE — PROGRESS NOTES
[facility]   Outpatient Sleep Medicine  Jan 18, 2022       Name: Essie Huddleston MRN# [id number]   Age: 76 year old YOB: 1945            Assessment and Plan:   1. KIM (obstructive sleep apnea)  2. Nocturnal hypoxemia  3. Obesity (BMI 30.0-34.9)    Patient and her daughter present to clinic today to discuss oral appliance therapy versus other for treatment of Essie's severe obstructive sleep apnea.  One of the primary reasons that they made today's visit is because they saw an Inspire commercial and wanted to inquire if this would be a good option for her.  Discussed that the results of her prior sleep study would qualify her as a candidate however her weight/BMI is out of goal range (BMI <32).  We also discussed Inspire therapy in detail and how this is a form of the surgery that requires sleep endoscopy etc before placement. They were under the impression that this was an external device only. If weight loss and if BMI is less than 32 may inquire further about Inspire but for now will continue with the oral appliance.  Is continuing to work with the sleep dentist on proper fitting.  I placed an order today for repeat sleep study to be completed with the oral appliance in place to determine efficacy.  Will postpone sleep study until it is at optimal setting, but sounds like this should be soon.  Plan to see patient back 1-2 weeks following her sleep study for discussion of results.  - Comprehensive Sleep Study; Future       Chief Complaint      Chief Complaint   Patient presents with     Video Visit     inquire about inspire           History of Present Illness:     Essie Huddleston is a 76 year old female who presents to the clinic with her daughter Candis for follow-up of their severe obstructive sleep apnea treated with oral appliance. Other past medical history significant for hypertension, hyperlipidemia, type 2 diabetes mellitus with nephropathy and retinopathy, chronic  "shoulder/back pain, and obesity.     Originally diagnosed via PSG on 2/24/2021 (194lbs) with AHI 33.8, REM AHI 49.5, supine AHI 53.0, RDI 54.6. Sleep associated hypoxemia was present with giselle 72.4% and 8.4 minutes <88%.      Patient initially elected treatment with CPAP. Received machine form [facility] on 3/17/21 but never turned it on and returned the machine on 4/7/2021. Patient reports the reason she returned the machine without use was because \"it was cumbersome to connect so many things and I thought I was going to make a mistake and it is too complicated\".     At her last visit decided to change therapy to oral appliance. Got the oral appliance from Dr. Glory Lopez [facility] [place] [facility]. Has been using the mouthguard for the past ~3 months or so. Rates her experience with the mouthguard as 5/10. Still seeing dentistry regularly for adjustment. Recently is having some issues with left molar pain. Some days will wake with dry throat and other times wakes with saliva pooling in mouth. Has a follow-up scheduled with dentist tomorrow to adjust/discuss concerns.     Sleeps alone so unsure if/how much mouthguard has benefited her symptoms. Daughter does not check on her every night but when she does walk by her mom's room she can still hear snoring. Grankids will complain of loud breathing at night.     Past medical/surgical history, family history, social history, medications and allergies were reviewed.           Physical Examination:   Ht 1.575 m (5' 2\")   Wt 86.2 kg (190 lb)   BMI 34.75 kg/m    General appearance: Awake, alert, cooperative. Well groomed. In no apparent distress.  HEENT: Head: Normocephalic, atraumatic. Eyes:Conjunctiva clear. Sclera normal. Nose: External appearance without deformity.   Pulmonary:  Able to speak easily in full sentences. No cough or wheeze.   Skin:  No rashes or significant lesions on visible skin.   Neurologic: Alert, oriented x3.   Psychiatric: Mood euthymic. " Affect congruent with full range and intensity.      CC:  Luigi Driscoll PA-C  Jan 18, 2022     [facility] [place]  [address] [place] [address]     [facility] [place]  [address] [address]    Chart documentation was completed, in part, with Cont[employer] voice-recognition software. Even though reviewed, some grammatical, spelling, and word errors may remain.    37 minutes spent on day of encounter doing chart review, history and exam, documentation, and further activities as noted above

## 2022-01-18 NOTE — PATIENT INSTRUCTIONS
Your BMI is Body mass index is 34.75 kg/m .  Weight management is a personal decision.  If you are interested in exploring weight loss strategies, the following discussion covers the approaches that may be successful. Body mass index (BMI) is one way to tell whether you are at a healthy weight, overweight, or obese. It measures your weight in relation to your height.  A BMI of 18.5 to 24.9 is in the healthy range. A person with a BMI of 25 to 29.9 is considered overweight, and someone with a BMI of 30 or greater is considered obese. More than two-thirds of American adults are considered overweight or obese.  Being overweight or obese increases the risk for further weight gain. Excess weight may lead to heart disease and diabetes.  Creating and following plans for healthy eating and physical activity may help you improve your health.  Weight control is part of healthy lifestyle and includes exercise, emotional health, and healthy eating habits. Careful eating habits lifelong are the mainstay of weight control. Though there are significant health benefits from weight loss, long-term weight loss with diet alone may be very difficult to achieve- studies show long-term success with dietary management in less than 10% of people. Attaining a healthy weight may be especially difficult to achieve in those with severe obesity. In some cases, medications, devices and surgical management might be considered.  What can you do?  If you are overweight or obese and are interested in methods for weight loss, you should discuss this with your provider.     Consider reducing daily calorie intake by 500 calories.     Keep a food journal.     Avoiding skipping meals, consider cutting portions instead.    Diet combined with exercise helps maintain muscle while optimizing fat loss. Strength training is particularly important for building and maintaining muscle mass. Exercise helps reduce stress, increase energy, and improves fitness.  Increasing exercise without diet control, however, may not burn enough calories to loose weight.       Start walking three days a week 10-20 minutes at a time    Work towards walking thirty minutes five days a week     Eventually, increase the speed of your walking for 1-2 minutes at time    And look into health and wellness programs that may be available through your health insurance provider, employer, local community center, or [employer].    Weight management plan: Patient was referred to their PCP to discuss a diet and exercise plan.

## 2022-02-14 DIAGNOSIS — G89.29 CHRONIC RIGHT-SIDED LOW BACK PAIN WITHOUT SCIATICA: ICD-10-CM

## 2022-02-14 DIAGNOSIS — F11.90 CHRONIC, CONTINUOUS USE OF OPIOIDS: ICD-10-CM

## 2022-02-14 DIAGNOSIS — M54.50 CHRONIC RIGHT-SIDED LOW BACK PAIN WITHOUT SCIATICA: ICD-10-CM

## 2022-02-15 NOTE — TELEPHONE ENCOUNTER
Controlled Substance Refill Request for: traMADol (ULTRAM) 50 MG tablet  Problem List Complete:  Yes  Patient is followed by Luigi Driscoll MD for ongoing prescription of pain medication.  All refills should only be approved by this provider, or covering partner.    Medication(s): Tramadol 50mg BID.   Maximum quantity per month: 60  Clinic visit frequency required: Q 3 months      Controlled substance agreement:  Encounter-Level CSA:    There are no encounter-level csa.     Patient-Level CSA:    Controlled Substance Agreement - Opioid - Scan on 1/27/2020 11:24 AM       Pain Clinic evaluation in the past: No       Last [facility] website verification:  done on 11/22/20   [email or web address]         checked in past 3 months?  Per provider

## 2022-02-16 RX ORDER — TRAMADOL HYDROCHLORIDE 50 MG/1
TABLET ORAL
Qty: 60 TABLET | Refills: 0 | Status: SHIPPED | OUTPATIENT
Start: 2022-02-16 | End: 2022-03-05

## 2022-02-16 NOTE — TELEPHONE ENCOUNTER
MN  reviewed.  Timing of refill appropriate.  Has follow-up appointment scheduled with me 3/3/2022.  Medication refilled

## 2022-02-17 PROBLEM — G89.29 CHRONIC RIGHT-SIDED LOW BACK PAIN WITHOUT SCIATICA: Status: ACTIVE | Noted: 2020-07-17

## 2022-02-17 PROBLEM — M54.50 CHRONIC RIGHT-SIDED LOW BACK PAIN WITHOUT SCIATICA: Status: ACTIVE | Noted: 2020-07-17

## 2022-03-01 DIAGNOSIS — G47.33 OSA (OBSTRUCTIVE SLEEP APNEA): Primary | ICD-10-CM

## 2022-03-03 ENCOUNTER — OFFICE VISIT (OUTPATIENT)
Dept: INTERNAL MEDICINE | Facility: CLINIC | Age: 77
End: 2022-03-03
Payer: COMMERCIAL

## 2022-03-03 VITALS
WEIGHT: 196 LBS | HEIGHT: 62 IN | OXYGEN SATURATION: 96 % | SYSTOLIC BLOOD PRESSURE: 136 MMHG | RESPIRATION RATE: 16 BRPM | HEART RATE: 67 BPM | BODY MASS INDEX: 36.07 KG/M2 | DIASTOLIC BLOOD PRESSURE: 72 MMHG | TEMPERATURE: 97 F

## 2022-03-03 DIAGNOSIS — G56.03 BILATERAL CARPAL TUNNEL SYNDROME: ICD-10-CM

## 2022-03-03 DIAGNOSIS — E11.21 TYPE 2 DIABETES MELLITUS WITH DIABETIC NEPHROPATHY, WITH LONG-TERM CURRENT USE OF INSULIN (H): Primary | ICD-10-CM

## 2022-03-03 DIAGNOSIS — E78.5 HYPERLIPIDEMIA LDL GOAL <100: ICD-10-CM

## 2022-03-03 DIAGNOSIS — R00.2 PALPITATIONS: ICD-10-CM

## 2022-03-03 DIAGNOSIS — M54.41 CHRONIC RIGHT-SIDED LOW BACK PAIN WITH RIGHT-SIDED SCIATICA: ICD-10-CM

## 2022-03-03 DIAGNOSIS — F11.90 CHRONIC, CONTINUOUS USE OF OPIOIDS: ICD-10-CM

## 2022-03-03 DIAGNOSIS — E66.01 SEVERE OBESITY WITH BODY MASS INDEX (BMI) OF 35.0 TO 39.9 WITH COMORBIDITY (H): ICD-10-CM

## 2022-03-03 DIAGNOSIS — Z79.4 TYPE 2 DIABETES MELLITUS WITH DIABETIC NEPHROPATHY, WITH LONG-TERM CURRENT USE OF INSULIN (H): Primary | ICD-10-CM

## 2022-03-03 DIAGNOSIS — I10 ESSENTIAL HYPERTENSION WITH GOAL BLOOD PRESSURE LESS THAN 130/80: ICD-10-CM

## 2022-03-03 DIAGNOSIS — G89.29 CHRONIC RIGHT-SIDED LOW BACK PAIN WITH RIGHT-SIDED SCIATICA: ICD-10-CM

## 2022-03-03 DIAGNOSIS — Z12.31 ENCOUNTER FOR SCREENING MAMMOGRAM FOR BREAST CANCER: ICD-10-CM

## 2022-03-03 DIAGNOSIS — E55.9 VITAMIN D DEFICIENCY: ICD-10-CM

## 2022-03-03 DIAGNOSIS — R06.2 WHEEZE: ICD-10-CM

## 2022-03-03 LAB
ALBUMIN SERPL-MCNC: 3.6 G/DL (ref 3.4–5)
ALP SERPL-CCNC: 71 U/L (ref 40–150)
ALT SERPL W P-5'-P-CCNC: 21 U/L (ref 0–50)
ANION GAP SERPL CALCULATED.3IONS-SCNC: 2 MMOL/L (ref 3–14)
AST SERPL W P-5'-P-CCNC: 11 U/L (ref 0–45)
BILIRUB SERPL-MCNC: 0.4 MG/DL (ref 0.2–1.3)
BUN SERPL-MCNC: 11 MG/DL (ref 7–30)
CALCIUM SERPL-MCNC: 9.5 MG/DL (ref 8.5–10.1)
CHLORIDE BLD-SCNC: 107 MMOL/L (ref 94–109)
CHOLEST SERPL-MCNC: 139 MG/DL
CO2 SERPL-SCNC: 31 MMOL/L (ref 20–32)
CREAT SERPL-MCNC: 0.78 MG/DL (ref 0.52–1.04)
CREAT UR-MCNC: 84 MG/DL
FASTING STATUS PATIENT QL REPORTED: YES
GFR SERPL CREATININE-BSD FRML MDRD: 79 ML/MIN/1.73M2
GLUCOSE BLD-MCNC: 99 MG/DL (ref 70–99)
HBA1C MFR BLD: 7.3 % (ref 0–5.6)
HDLC SERPL-MCNC: 72 MG/DL
LDLC SERPL CALC-MCNC: 43 MG/DL
MICROALBUMIN UR-MCNC: 7 MG/L
MICROALBUMIN/CREAT UR: 8.33 MG/G CR (ref 0–25)
NONHDLC SERPL-MCNC: 67 MG/DL
POTASSIUM BLD-SCNC: 4.7 MMOL/L (ref 3.4–5.3)
PROT SERPL-MCNC: 7.4 G/DL (ref 6.8–8.8)
SODIUM SERPL-SCNC: 140 MMOL/L (ref 133–144)
TRIGL SERPL-MCNC: 119 MG/DL

## 2022-03-03 PROCEDURE — 82043 UR ALBUMIN QUANTITATIVE: CPT | Performed by: INTERNAL MEDICINE

## 2022-03-03 PROCEDURE — 82306 VITAMIN D 25 HYDROXY: CPT | Performed by: INTERNAL MEDICINE

## 2022-03-03 PROCEDURE — 36415 COLL VENOUS BLD VENIPUNCTURE: CPT | Performed by: INTERNAL MEDICINE

## 2022-03-03 PROCEDURE — 80053 COMPREHEN METABOLIC PANEL: CPT | Performed by: INTERNAL MEDICINE

## 2022-03-03 PROCEDURE — 83036 HEMOGLOBIN GLYCOSYLATED A1C: CPT | Performed by: INTERNAL MEDICINE

## 2022-03-03 PROCEDURE — 80061 LIPID PANEL: CPT | Performed by: INTERNAL MEDICINE

## 2022-03-03 PROCEDURE — 99214 OFFICE O/P EST MOD 30 MIN: CPT | Performed by: INTERNAL MEDICINE

## 2022-03-03 NOTE — PATIENT INSTRUCTIONS
Labs as ordered  Reduce calorie/carbohydrate (sugar, bread, potato, pasta, rice,  etc)  intake in diet.  Increase color on your plate with fruits and vegetables. Increase  frequency of walking or other aerobic exercise as able   Mammogram. This will be done at the [facility]. Call [phone] or use [employer] to schedule.   Trial of Zyrtec/ Cetirizine or Allegra/fexofenadine 1 tab daily for the next 1 week to see if helps with occasional wheezing and update me in Mychart re: response  Continue other meds.  Refills done   Follow up in 3 months regarding chronic pain management. Earlier as needed  Will contact  [facility] to get most recent eye exam report

## 2022-03-03 NOTE — PROGRESS NOTES
ASSESSMENT:   1. Type 2 diabetes mellitus with diabetic nephropathy, with long-term current use of insulin (H)  Previously controlled for age.  Blood sugars occasionally running a little bit above goal.  Needs lab recheck.  Continue current medications pending lab results  - Hemoglobin A1c; Future  - Comprehensive metabolic panel; Future  - Lipid panel reflex to direct LDL Fasting; Future  - Albumin Random Urine Quantitative with Creat Ratio; Future  - Hemoglobin A1c  - Comprehensive metabolic panel  - Lipid panel reflex to direct LDL Fasting  - Albumin Random Urine Quantitative with Creat Ratio  - metFORMIN (GLUCOPHAGE) 1000 MG tablet; Take 1 tablet (1,000 mg) by mouth 2 times daily (with meals)  Dispense: 180 tablet; Refill: 3  - insulin pen needle (RELION PEN NEEDLES) 31G X 6 MM miscellaneous; USE 1  ONCE DAILY OR  AS  DIRECTED  Dispense: 100 each; Refill: 3  - insulin detemir (LEVEMIR FLEXTOUCH) 100 UNIT/ML pen; INJECT 35 UNITS SUBCUTANEOUSLY ONCE DAILY IN THE EVENING  Dispense: 45 mL; Refill: 3  - glipiZIDE (GLUCOTROL) 5 MG tablet; TAKE 1 TABLET BY MOUTH TWICE DAILY BEFORE MEAL(S)  Dispense: 180 tablet; Refill: 3  - blood glucose (NO BRAND SPECIFIED) test strip; Use to test blood sugar 2 times daily or as directed.  Dispense: 200 strip; Refill: 3    2. Severe obesity with body mass index (BMI) of 35.0 to 39.9 with comorbidity (H)  Weight increased.  Contributing to diabetes, hypertension, hyperlipidemia, back pain.  Counseled regarding calorie/carbohydrate reduction.  Walking exercise as able    3. Chronic, continuous use of opioids   MN  reviewed.  Due for refill of medication 3/18/2022.   Pain symptoms controlled with medication therapy.  Energy okay during the day and bulb was normal.  Continue current medication  - traMADol (ULTRAM) 50 MG tablet; Take 1 tablet (50 mg) by mouth 2 times daily  Dispense: 60 tablet; Refill: 2    4. Chronic right-sided low back pain with right-sided sciatica  Controlled  symptoms with medications as below  - traMADol (ULTRAM) 50 MG tablet; Take 1 tablet (50 mg) by mouth 2 times daily  Dispense: 60 tablet; Refill: 2  - gabapentin (NEURONTIN) 300 MG capsule; TAKE 1 CAPSULE BY MOUTH THREE TIMES DAILY  Dispense: 270 capsule; Refill: 3  - celecoxib (CELEBREX) 100 MG capsule; Take 1 capsule (100 mg) by mouth 2 times daily  Dispense: 180 capsule; Refill: 3    5. Hyperlipidemia LDL goal <100  Previously at goal.  Continue statin therapy.  Labs today as ordered  - Comprehensive metabolic panel; Future  - Lipid panel reflex to direct LDL Fasting; Future  - Comprehensive metabolic panel  - Lipid panel reflex to direct LDL Fasting  - simvastatin (ZOCOR) 20 MG tablet; Take 1 tablet (20 mg) by mouth At Bedtime  Dispense: 90 tablet; Refill: 3    6. Essential hypertension with goal blood pressure less than 130/80  Controlled.  Continue current medication  - losartan (COZAAR) 100 MG tablet; Take 1 tablet (100 mg) by mouth daily  Dispense: 90 tablet; Refill: 3  - bisoprolol (ZEBETA) 5 MG tablet; TAKE A 1/2 TO 1 TABLET BY MOUTH ONCE DAILY  Dispense: 90 tablet; Refill: 3  - amLODIPine (NORVASC) 5 MG tablet; Take 1 tablet (5 mg) by mouth daily  Dispense: 90 tablet; Refill: 3    7. Bilateral carpal tunnel syndrome  Confirmed by EMG.  Symptoms not bothersome enough to patient that she wishes treatment at this time (surgery, wrist brace, wrist injection) besides gabapentin therapy  - gabapentin (NEURONTIN) 300 MG capsule; TAKE 1 CAPSULE BY MOUTH THREE TIMES DAILY  Dispense: 270 capsule; Refill: 3    8. Wheeze  Lungs clear on exam today.  Possibly related to allergy issue such as exposure to cats when at [employer] study, etc.  Patient will try using antihistamine therapy.  If not helping, patient to inform physician and will prescribe albuterol inhaler to use as needed    9. Palpitations  Asymptomatic with medication therapy.  - bisoprolol (ZEBETA) 5 MG tablet; TAKE A 1/2 TO 1 TABLET BY MOUTH ONCE DAILY   Dispense: 90 tablet; Refill: 3    10. Vitamin D deficiency  History of vitamin D deficiency previously.  Needs lab recheck  - Vitamin D Deficiency; Future  - Vitamin D Deficiency    11. Encounter for screening mammogram for breast cancer  Patient wishes to continue breast cancer screening.  Mammogram ordered  - *MA Screening Digital Bilateral; Future      PLAN:   Labs as ordered  Reduce calorie/carbohydrate (sugar, bread, potato, pasta, rice,  etc)  intake in diet.  Increase color on your plate with fruits and vegetables. Increase  frequency of walking or other aerobic exercise as able   Mammogram. This will be done at the [facility]. Call [phone] or use [employer] to schedule.   Trial of Zyrtec/ Cetirizine or Allegra/fexofenadine 1 tab daily for the next 1 week to see if helps with occasional wheezing and update me in [employer] re: response  Continue other meds.  Refills done   Follow up in 3 months regarding chronic pain management. Earlier as needed  Will contact  [facility] to get most recent eye exam report      (Chart documentation was completed, in part, with [employer] voice-recognition software. Even though reviewed, some grammatical, spelling, and word errors may remain.)    Luigi Driscoll MD  Internal Medicine Department  [facility]      Netta Fountain is a 76 year old who presents for the following health issues     History of Present Illness       Diabetes:   She presents for follow up of diabetes.  She is checking home blood glucose a few times a week. She checks blood glucose before meals.  Blood glucose is sometimes over 200 and never under 70. She is aware of hypoglycemia symptoms including shakiness, dizziness, blurred vision and confusion. She is concerned about other. She is having burning in feet, blurry vision and weight gain. The patient has had a diabetic eye exam in the last 12 months.         She eats 2-3 servings of fruits  and vegetables daily.She consumes 2 sweetened beverage(s) daily.She exercises with enough effort to increase her heart rate 9 or less minutes per day.  She exercises with enough effort to increase her heart rate 3 or less days per week.   She is taking medications regularly.      Most recent lab results reviewed with pt.        Component      Latest Ref Rng & Units 6/2/2021   Sodium      133 - 144 mmol/L 140   Potassium      3.4 - 5.3 mmol/L 4.4   Chloride      94 - 109 mmol/L 106   Carbon Dioxide      20 - 32 mmol/L 32   Anion Gap      3 - 14 mmol/L 2 (L)   Glucose      70 - 99 mg/dL 107 (H)   Urea Nitrogen      7 - 30 mg/dL 14   Creatinine      0.52 - 1.04 mg/dL 0.74   GFR Estimate      >60 mL/min/1.73:m2 80   GFR Estimate If Black      >60 mL/min/1.73:m2 >90   Calcium      8.5 - 10.1 mg/dL 9.1   Bilirubin Total      0.2 - 1.3 mg/dL 0.4   Albumin      3.4 - 5.0 g/dL 4.1   Protein Total      6.8 - 8.8 g/dL 7.7   Alkaline Phosphatase      40 - 150 U/L 68   ALT      0 - 50 U/L 23   AST      0 - 45 U/L 13   Creatinine Urine      mg/dL 28   O-Desmethyl Tramadol ng/mL      <50 ng/mL    O-Desmethyl Tramadol      ABS:Absent ng/mgcreat    Tramadol ng/mL      <50 ng/mL    Tramadol      ABS:Absent ng/mgcreat    Gabapentin      ABS:Absent    Amphetamine ng/mL      <50 ng/mL    Amphetamine      ABS:Absent ng/mgcreat    Methamphetamine ng/mL      <50 ng/mL    Methamphetamine      ABS:Absent ng/mgcreat    Comp Urine Drug Confirmation Comments          Cholesterol      <200 mg/dL 144   Triglycerides      <150 mg/dL 109   HDL Cholesterol      >49 mg/dL 72   LDL Cholesterol Calculated      <100 mg/dL 50   Non HDL Cholesterol      <130 mg/dL 72   Albumin Urine mg/L      mg/L <5   Albumin Urine mg/g Cr      0 - 25 mg/g Cr Unable to calculate due to low value   Hemoglobin A1C POCT      0 - 5.6 % 7.5 (H)   Hemoglobin A1C      0.0 - 5.6 %      Component      Latest Ref Rng & Units 6/8/2021 9/17/2021   Sodium      133 - 144 mmol/L      Potassium      3.4 - 5.3 mmol/L     Chloride      94 - 109 mmol/L     Carbon Dioxide      20 - 32 mmol/L     Anion Gap      3 - 14 mmol/L     Glucose      70 - 99 mg/dL     Urea Nitrogen      7 - 30 mg/dL     Creatinine      0.52 - 1.04 mg/dL     GFR Estimate      >60 mL/min/1.73:m2     GFR Estimate If Black      >60 mL/min/1.73:m2     Calcium      8.5 - 10.1 mg/dL     Bilirubin Total      0.2 - 1.3 mg/dL     Albumin      3.4 - 5.0 g/dL     Protein Total      6.8 - 8.8 g/dL     Alkaline Phosphatase      40 - 150 U/L     ALT      0 - 50 U/L     AST      0 - 45 U/L     Creatinine Urine      mg/dL 44    O-Desmethyl Tramadol ng/mL      <50 ng/mL 8,020 (HH)    O-Desmethyl Tramadol      ABS:Absent ng/mgcreat 18,227 (A)    Tramadol ng/mL      <50 ng/mL 10,300 (HH)    Tramadol      ABS:Absent ng/mgcreat 23,409 (A)    Gabapentin      ABS:Absent Present (AA)    Amphetamine ng/mL      <50 ng/mL 97 (HH)    Amphetamine      ABS:Absent ng/mgcreat 220 (A)    Methamphetamine ng/mL      <50 ng/mL 182 (HH)    Methamphetamine      ABS:Absent ng/mgcreat 414 (A)    Comp Urine Drug Confirmation Comments       (Note)    Cholesterol      <200 mg/dL     Triglycerides      <150 mg/dL     HDL Cholesterol      >49 mg/dL     LDL Cholesterol Calculated      <100 mg/dL     Non HDL Cholesterol      <130 mg/dL     Albumin Urine mg/L      mg/L     Albumin Urine mg/g Cr      0 - 25 mg/g Cr     Hemoglobin A1C POCT      0 - 5.6 %     Hemoglobin A1C      0.0 - 5.6 %  7.7 (H)        Sugar  Yesterday    AMs usually  and rarely 150   before supper  180- 220s   Breathing well today. Occ wheezing during the day.  Rare  cough.     Occ Claritin or Zyrtec that thinks helps. Increased sx at bib study where there is a cat.   No chest pain or vision changes. Eye exam late last year [facility]   Stable chronic shoulder and back pain control with Gabapentin and Tramadol. BMs OK.  Taking tramadol twice a day.  Last refill 2/16/2022.  "MN  reviewed  Chronic mild carpal tunnel symptoms with numbness in the median distribution of the hands.  Previous EMG from last year confirm diagnosis with symptoms worse right compared to left.  Not dropping things.  Symptoms not bothersome enough to patient that she wishes to use wrist braces or consider surgical treatment at this time.  Denies chest pain or abdominal pain.  Weight up 6 pounds from last visit      Additional ROS:   Constitutional, HEENT, Cardiovascular, Pulmonary, GI and , Neuro, MSK and Psych review of systems/symptoms are otherwise negative or unchanged from previous, except as noted above.      OBJECTIVE:  /72   Pulse 67   Temp 97  F (36.1  C) (Temporal)   Resp 16   Ht 1.575 m (5' 2\")   Wt 88.9 kg (196 lb)   SpO2 96%   BMI 35.85 kg/m     Estimated body mass index is 35.85 kg/m  as calculated from the following:    Height as of this encounter: 1.575 m (5' 2\").    Weight as of this encounter: 88.9 kg (196 lb).     Neck: no adenopathy. Thyroid normal to palpation. No bruits  Pulm: Lungs clear to auscultation   CV: Regular rates and rhythm  GI: Soft, obese, nontender, Normal active bowel sounds, No hepatosplenomegaly or masses palpable  Ext: Peripheral pulses intact. No edema.  Neuro: Normal strength and tone, sensory exam grossly normal except mild reduced light touch sensation median nerve distribution bilateral hands/fingers  MSK: Mild tenderness palpation bilateral paralumbar musculature and right shoulder range of motion with abduction beyond 90 degrees and slight to internal/external rotation              "

## 2022-03-04 LAB — DEPRECATED CALCIDIOL+CALCIFEROL SERPL-MC: 43 UG/L (ref 20–75)

## 2022-03-05 RX ORDER — SIMVASTATIN 20 MG
20 TABLET ORAL AT BEDTIME
Qty: 90 TABLET | Refills: 3 | Status: SHIPPED | OUTPATIENT
Start: 2022-03-05 | End: 2023-03-24

## 2022-03-05 RX ORDER — AMLODIPINE BESYLATE 5 MG/1
5 TABLET ORAL DAILY
Qty: 90 TABLET | Refills: 3 | Status: SHIPPED | OUTPATIENT
Start: 2022-03-05 | End: 2023-03-14

## 2022-03-05 RX ORDER — INSULIN DETEMIR 100 [IU]/ML
INJECTION, SOLUTION SUBCUTANEOUS
Qty: 45 ML | Refills: 3 | Status: SHIPPED | OUTPATIENT
Start: 2022-03-05 | End: 2023-03-14

## 2022-03-05 RX ORDER — TRAMADOL HYDROCHLORIDE 50 MG/1
50 TABLET ORAL 2 TIMES DAILY
Qty: 60 TABLET | Refills: 2 | Status: SHIPPED | OUTPATIENT
Start: 2022-03-05 | End: 2022-04-15

## 2022-03-05 RX ORDER — PEN NEEDLE, DIABETIC 32GX 5/32"
NEEDLE, DISPOSABLE MISCELLANEOUS
Qty: 100 EACH | Refills: 3 | Status: SHIPPED | OUTPATIENT
Start: 2022-03-05 | End: 2023-04-07

## 2022-03-05 RX ORDER — GABAPENTIN 300 MG/1
CAPSULE ORAL
Qty: 270 CAPSULE | Refills: 3 | Status: SHIPPED | OUTPATIENT
Start: 2022-03-05 | End: 2022-09-07

## 2022-03-05 RX ORDER — BISOPROLOL FUMARATE 5 MG/1
TABLET, FILM COATED ORAL
Qty: 90 TABLET | Refills: 3 | Status: SHIPPED | OUTPATIENT
Start: 2022-03-05 | End: 2023-03-14

## 2022-03-05 RX ORDER — CELECOXIB 100 MG/1
100 CAPSULE ORAL 2 TIMES DAILY
Qty: 180 CAPSULE | Refills: 3 | Status: SHIPPED | OUTPATIENT
Start: 2022-03-05 | End: 2023-03-24

## 2022-03-05 RX ORDER — GLIPIZIDE 5 MG/1
TABLET ORAL
Qty: 180 TABLET | Refills: 3 | Status: SHIPPED | OUTPATIENT
Start: 2022-03-05 | End: 2023-05-11

## 2022-03-05 RX ORDER — LOSARTAN POTASSIUM 100 MG/1
100 TABLET ORAL DAILY
Qty: 90 TABLET | Refills: 3 | Status: SHIPPED | OUTPATIENT
Start: 2022-03-05 | End: 2023-03-14

## 2022-03-07 ENCOUNTER — TELEPHONE (OUTPATIENT)
Dept: INTERNAL MEDICINE | Facility: CLINIC | Age: 77
End: 2022-03-07
Payer: COMMERCIAL

## 2022-03-07 NOTE — TELEPHONE ENCOUNTER
----- Message from Luigi Driscoll MD sent at 3/5/2022  8:16 PM CST -----  Regarding: Eye exam report   Pt states eye exam late last year [facility]. Call their [place] [facility] site to get eye exam report faxed to us

## 2022-03-08 ENCOUNTER — ANCILLARY PROCEDURE (OUTPATIENT)
Dept: MAMMOGRAPHY | Facility: CLINIC | Age: 77
End: 2022-03-08
Attending: INTERNAL MEDICINE
Payer: COMMERCIAL

## 2022-03-08 DIAGNOSIS — Z12.31 ENCOUNTER FOR SCREENING MAMMOGRAM FOR BREAST CANCER: ICD-10-CM

## 2022-03-08 DIAGNOSIS — Z12.31 VISIT FOR SCREENING MAMMOGRAM: ICD-10-CM

## 2022-03-08 PROCEDURE — 77063 BREAST TOMOSYNTHESIS BI: CPT | Mod: TC | Performed by: RADIOLOGY

## 2022-03-08 PROCEDURE — 77067 SCR MAMMO BI INCL CAD: CPT | Mod: TC | Performed by: RADIOLOGY

## 2022-03-09 DIAGNOSIS — R09.81 NASAL CONGESTION: ICD-10-CM

## 2022-03-10 RX ORDER — FLUTICASONE PROPIONATE 50 MCG
SPRAY, SUSPENSION (ML) NASAL
Qty: 48 G | Refills: 3 | Status: SHIPPED | OUTPATIENT
Start: 2022-03-10 | End: 2023-03-31

## 2022-03-10 NOTE — TELEPHONE ENCOUNTER
Routing refill request to provider for review/approval because:  Medication is reported/historical    Shalonda Allen RN

## 2022-04-13 DIAGNOSIS — M54.41 CHRONIC RIGHT-SIDED LOW BACK PAIN WITH RIGHT-SIDED SCIATICA: ICD-10-CM

## 2022-04-13 DIAGNOSIS — F11.90 CHRONIC, CONTINUOUS USE OF OPIOIDS: ICD-10-CM

## 2022-04-13 DIAGNOSIS — G89.29 CHRONIC RIGHT-SIDED LOW BACK PAIN WITH RIGHT-SIDED SCIATICA: ICD-10-CM

## 2022-04-14 NOTE — TELEPHONE ENCOUNTER
Routing refill request to provider for review/approval because:  Drug not on the [facility] refill protocol   Josee Houston RN

## 2022-04-15 RX ORDER — TRAMADOL HYDROCHLORIDE 50 MG/1
TABLET ORAL
Qty: 60 TABLET | Refills: 0 | Status: SHIPPED | OUTPATIENT
Start: 2022-04-15 | End: 2022-07-11

## 2022-04-20 DIAGNOSIS — E11.21 TYPE 2 DIABETES MELLITUS WITH DIABETIC NEPHROPATHY, WITH LONG-TERM CURRENT USE OF INSULIN (H): ICD-10-CM

## 2022-04-20 DIAGNOSIS — Z79.4 TYPE 2 DIABETES MELLITUS WITH DIABETIC NEPHROPATHY, WITH LONG-TERM CURRENT USE OF INSULIN (H): ICD-10-CM

## 2022-04-20 RX ORDER — LANCETS
EACH MISCELLANEOUS
Qty: 200 EACH | Refills: 1 | Status: SHIPPED | OUTPATIENT
Start: 2022-04-20 | End: 2023-05-11

## 2022-04-20 NOTE — TELEPHONE ENCOUNTER
Pharmacy calling for refill on lancets.     Prescription approved per [facility] Refill Protocol.  Shalonda Allen RN

## 2022-05-14 ENCOUNTER — HEALTH MAINTENANCE LETTER (OUTPATIENT)
Age: 77
End: 2022-05-14

## 2022-05-27 ENCOUNTER — OFFICE VISIT (OUTPATIENT)
Dept: FAMILY MEDICINE | Facility: CLINIC | Age: 77
End: 2022-05-27
Payer: COMMERCIAL

## 2022-05-27 VITALS
WEIGHT: 191.6 LBS | HEART RATE: 66 BPM | BODY MASS INDEX: 35.04 KG/M2 | SYSTOLIC BLOOD PRESSURE: 122 MMHG | DIASTOLIC BLOOD PRESSURE: 70 MMHG | OXYGEN SATURATION: 99 %

## 2022-05-27 DIAGNOSIS — Z79.4 TYPE 2 DIABETES MELLITUS WITH DIABETIC NEPHROPATHY, WITH LONG-TERM CURRENT USE OF INSULIN (H): ICD-10-CM

## 2022-05-27 DIAGNOSIS — E11.21 TYPE 2 DIABETES MELLITUS WITH DIABETIC NEPHROPATHY, WITH LONG-TERM CURRENT USE OF INSULIN (H): ICD-10-CM

## 2022-05-27 DIAGNOSIS — K13.79 MOUTH PAIN: Primary | ICD-10-CM

## 2022-05-27 LAB
ERYTHROCYTE [DISTWIDTH] IN BLOOD BY AUTOMATED COUNT: 12.9 % (ref 10–15)
HCT VFR BLD AUTO: 38.8 % (ref 35–47)
HGB BLD-MCNC: 12.5 G/DL (ref 11.7–15.7)
MCH RBC QN AUTO: 30 PG (ref 26.5–33)
MCHC RBC AUTO-ENTMCNC: 32.2 G/DL (ref 31.5–36.5)
MCV RBC AUTO: 93 FL (ref 78–100)
PLATELET # BLD AUTO: 298 10E3/UL (ref 150–450)
RBC # BLD AUTO: 4.16 10E6/UL (ref 3.8–5.2)
WBC # BLD AUTO: 5.3 10E3/UL (ref 4–11)

## 2022-05-27 PROCEDURE — 99214 OFFICE O/P EST MOD 30 MIN: CPT | Performed by: FAMILY MEDICINE

## 2022-05-27 PROCEDURE — 85027 COMPLETE CBC AUTOMATED: CPT | Performed by: FAMILY MEDICINE

## 2022-05-27 PROCEDURE — 36415 COLL VENOUS BLD VENIPUNCTURE: CPT | Performed by: FAMILY MEDICINE

## 2022-05-27 PROCEDURE — 87040 BLOOD CULTURE FOR BACTERIA: CPT | Mod: 59 | Performed by: FAMILY MEDICINE

## 2022-05-27 RX ORDER — CHLORHEXIDINE GLUCONATE ORAL RINSE 1.2 MG/ML
SOLUTION DENTAL
COMMUNITY
End: 2023-06-15

## 2022-05-27 ASSESSMENT — ANXIETY QUESTIONNAIRES
GAD7 TOTAL SCORE: 5
7. FEELING AFRAID AS IF SOMETHING AWFUL MIGHT HAPPEN: SEVERAL DAYS
5. BEING SO RESTLESS THAT IT IS HARD TO SIT STILL: NOT AT ALL
2. NOT BEING ABLE TO STOP OR CONTROL WORRYING: SEVERAL DAYS
8. IF YOU CHECKED OFF ANY PROBLEMS, HOW DIFFICULT HAVE THESE MADE IT FOR YOU TO DO YOUR WORK, TAKE CARE OF THINGS AT HOME, OR GET ALONG WITH OTHER PEOPLE?: SOMEWHAT DIFFICULT
7. FEELING AFRAID AS IF SOMETHING AWFUL MIGHT HAPPEN: SEVERAL DAYS
GAD7 TOTAL SCORE: 5
1. FEELING NERVOUS, ANXIOUS, OR ON EDGE: SEVERAL DAYS
3. WORRYING TOO MUCH ABOUT DIFFERENT THINGS: SEVERAL DAYS
6. BECOMING EASILY ANNOYED OR IRRITABLE: SEVERAL DAYS
GAD7 TOTAL SCORE: 5
4. TROUBLE RELAXING: NOT AT ALL

## 2022-05-27 NOTE — PROGRESS NOTES
"Answers for HPI/ROS submitted by the patient on 5/27/2022  SHARAD 7 TOTAL SCORE: 5    Chief complaint jaw/mouth pain    HPI: The patient is here with her daughter been worked urgently into the schedule today.  That the patient has about a 1 week history of pain that she describes extra along her anterior neck but sometimes she has pain that radiates up to her right ear and to the side of her face in the temporal area.  She says it does not usually really hit her until about 4:00 in the afternoon and she will take some extra strength Tylenol and then at bedtime she takes Tylenol PM.  She uses gabapentin 3 times a day for other issues and sometimes she will take the Tylenol at the same time she takes the gabapentin.  She does have access to a thermometer but has not taken her temperature she just describes feeling \"feverish\" yesterday.  She felt a little bit weak.  This pain comes and goes it is usually really good in the morning and as the day goes on it gets worse.    Her daughter got her an emergency visit with the dentist yesterday and she was diagnosed with gum disease they did a regular cleaning and some x-rays she is going to have to have a tooth pulled and some root canal and they scheduled a deep cleaning for about 2 weeks in the future they were not able to accomplish that yesterday.  They did not think there was any sign of infection at the time they saw her.    She tells me that her blood sugars usually are in the 120s to 130s and yesterday morning it was 159.  She has a lot of stress but there is also a question of whether or not there is not a smoldering infection at play.    Objective:/70   Pulse 66   Wt 86.9 kg (191 lb 9.6 oz)   SpO2 99%   BMI 35.04 kg/m    She is in no acute distress and is quite articulate and her daughter is also here to help fill in the history.  Her conjunctiva are clear and TMs are unremarkable.  She does not have any lesion or rash on her face, along her right jawline or " along her neck.  She points to an area on her neck which is basically the anterior cervical chain and I do not feel any anterior cervical adenopathy or posterior cervical adenopathy.  When I palpate this area does reproduce her discomfort.  But she says it is not very prominent right now at 9:00 in the morning.    Her oral exam shows no obvious tonsillar swelling or redness, no posterior pharyngeal drainage, and no swelling along her cheek or gum that I can determine today    Assessment: Neck pain/jaw pain  Elevated blood sugars in the face of diabetes    Plan: I was tempted to treat with penicillin for an intraoral infection but I do not have an obvious source so we decided to do a CBC and a blood culture first and if those come back in indicating an infection we can at least start some penicillin before she is seen by the dentist or oral surgeon.  The patient and her daughter were comfortable with this conservative plan and if things get worse certainly they will be seen sooner than later and they will follow-up with her primary physician as scheduled at the end of June for her diabetes check and sooner will be seen sooner if necessary

## 2022-06-01 LAB
BACTERIA BLD CULT: NO GROWTH
BACTERIA BLD CULT: NO GROWTH

## 2022-06-02 ENCOUNTER — TELEPHONE (OUTPATIENT)
Dept: INTERNAL MEDICINE | Facility: CLINIC | Age: 77
End: 2022-06-02
Payer: COMMERCIAL

## 2022-06-02 NOTE — TELEPHONE ENCOUNTER
Reason for Call:  Candis, patient's daughter call requesting an earlier date for an appointment/patient has been set for 8/9/22 for a diabetic follow up, if anything can be arranged by pcp please contact Candis for next route of care.     PCP: Luigi Driscoll        Have you been treated for this in the past? Yes      Can we leave a detailed message on this number? YES    Phone number patient can be reached at: Home number on file [phone] (home)    Best Time: any    Call taken on 6/2/2022 at 10:03 AM by Shanti Weaver

## 2022-06-05 NOTE — TELEPHONE ENCOUNTER
Rather than having to have patient wait 2 months to be seen, please schedule appointment with me in clinic in the next 3-4 weeks in open available reserve appointment slot that is not one of the 2 virtual appointments at the end of the day.  Call patient's daughter Efrain and schedule appointment with me.  Then cancel current scheduled appointments for 8/9/22

## 2022-06-13 NOTE — PATIENT INSTRUCTIONS
Continue current meds  Call  [phone] or use [employer] to schedule a future lab appointment  non-fasting in 3 months to follow-up on diabetic control with repeat A1c lab.   Reduce calorie/carbohydrate (sugar, bread, potato, pasta, rice, alcohol etc)  intake in diet.  Increase color on your plate with fruits and vegetables. Increase  frequency of walking or other aerobic exercise as able (goal is daily)

## 2022-06-18 DIAGNOSIS — E11.21 TYPE 2 DIABETES MELLITUS WITH DIABETIC NEPHROPATHY, WITH LONG-TERM CURRENT USE OF INSULIN (H): Primary | ICD-10-CM

## 2022-06-18 DIAGNOSIS — Z79.4 TYPE 2 DIABETES MELLITUS WITH DIABETIC NEPHROPATHY, WITH LONG-TERM CURRENT USE OF INSULIN (H): Primary | ICD-10-CM

## 2022-06-21 ENCOUNTER — LAB (OUTPATIENT)
Dept: LAB | Facility: CLINIC | Age: 77
End: 2022-06-21
Payer: COMMERCIAL

## 2022-06-21 DIAGNOSIS — E11.21 TYPE 2 DIABETES MELLITUS WITH DIABETIC NEPHROPATHY, WITH LONG-TERM CURRENT USE OF INSULIN (H): ICD-10-CM

## 2022-06-21 DIAGNOSIS — Z79.4 TYPE 2 DIABETES MELLITUS WITH DIABETIC NEPHROPATHY, WITH LONG-TERM CURRENT USE OF INSULIN (H): ICD-10-CM

## 2022-06-21 LAB — HBA1C MFR BLD: 7.9 % (ref 0–5.6)

## 2022-06-21 PROCEDURE — 36415 COLL VENOUS BLD VENIPUNCTURE: CPT

## 2022-06-21 PROCEDURE — 83036 HEMOGLOBIN GLYCOSYLATED A1C: CPT

## 2022-06-27 ENCOUNTER — OFFICE VISIT (OUTPATIENT)
Dept: INTERNAL MEDICINE | Facility: CLINIC | Age: 77
End: 2022-06-27
Payer: COMMERCIAL

## 2022-06-27 VITALS
DIASTOLIC BLOOD PRESSURE: 80 MMHG | BODY MASS INDEX: 35.96 KG/M2 | SYSTOLIC BLOOD PRESSURE: 138 MMHG | HEART RATE: 71 BPM | OXYGEN SATURATION: 96 % | RESPIRATION RATE: 16 BRPM | WEIGHT: 196.6 LBS

## 2022-06-27 DIAGNOSIS — Z79.4 TYPE 2 DIABETES MELLITUS WITH DIABETIC NEPHROPATHY, WITH LONG-TERM CURRENT USE OF INSULIN (H): ICD-10-CM

## 2022-06-27 DIAGNOSIS — G89.29 CHRONIC RIGHT-SIDED LOW BACK PAIN WITHOUT SCIATICA: ICD-10-CM

## 2022-06-27 DIAGNOSIS — I10 ESSENTIAL HYPERTENSION WITH GOAL BLOOD PRESSURE LESS THAN 130/80: ICD-10-CM

## 2022-06-27 DIAGNOSIS — E66.01 SEVERE OBESITY WITH BODY MASS INDEX (BMI) OF 35.0 TO 39.9 WITH COMORBIDITY (H): ICD-10-CM

## 2022-06-27 DIAGNOSIS — E11.21 TYPE 2 DIABETES MELLITUS WITH DIABETIC NEPHROPATHY, WITH LONG-TERM CURRENT USE OF INSULIN (H): ICD-10-CM

## 2022-06-27 DIAGNOSIS — M54.50 CHRONIC RIGHT-SIDED LOW BACK PAIN WITHOUT SCIATICA: ICD-10-CM

## 2022-06-27 PROCEDURE — 99214 OFFICE O/P EST MOD 30 MIN: CPT | Performed by: INTERNAL MEDICINE

## 2022-06-27 PROCEDURE — 99207 PR FOOT EXAM NO CHARGE: CPT | Mod: 25 | Performed by: INTERNAL MEDICINE

## 2022-06-27 ASSESSMENT — ANXIETY QUESTIONNAIRES
GAD7 TOTAL SCORE: 6
2. NOT BEING ABLE TO STOP OR CONTROL WORRYING: SEVERAL DAYS
1. FEELING NERVOUS, ANXIOUS, OR ON EDGE: SEVERAL DAYS
3. WORRYING TOO MUCH ABOUT DIFFERENT THINGS: NEARLY EVERY DAY
GAD7 TOTAL SCORE: 6
GAD7 TOTAL SCORE: 6
7. FEELING AFRAID AS IF SOMETHING AWFUL MIGHT HAPPEN: NOT AT ALL
4. TROUBLE RELAXING: NOT AT ALL
5. BEING SO RESTLESS THAT IT IS HARD TO SIT STILL: NOT AT ALL
7. FEELING AFRAID AS IF SOMETHING AWFUL MIGHT HAPPEN: NOT AT ALL
6. BECOMING EASILY ANNOYED OR IRRITABLE: SEVERAL DAYS
8. IF YOU CHECKED OFF ANY PROBLEMS, HOW DIFFICULT HAVE THESE MADE IT FOR YOU TO DO YOUR WORK, TAKE CARE OF THINGS AT HOME, OR GET ALONG WITH OTHER PEOPLE?: SOMEWHAT DIFFICULT

## 2022-06-27 NOTE — PROGRESS NOTES
ASSESSMENT:   1. Type 2 diabetes mellitus with diabetic nephropathy, with long-term current use of insulin (H)  Previous A1c upper limit goal  with age.  Blood sugars more recently however seem improved.  Continue current medication and repeat diabetic labs in 3 months  - FOOT EXAM  - Basic metabolic panel; Future  - Hemoglobin A1c; Future    2. Chronic right-sided low back pain without sciatica  Controlled.  Continue Celebrex and tramadol. [place] on [place] and [place]    3. Severe obesity with body mass index (BMI) of 35.0 to 39.9 with comorbidity (H)  Weight up.  Affecting diabetes, back pain, etc.  Counseled regarding calorie/carbohydrate reduction.  Walking exercise as able    4. Essential hypertension with goal blood pressure less than 130/80  Blood pressure minimally above goal.  Continue current medications along with calorie reduction/exercise for weight loss  - Basic metabolic panel; Future        PLAN:  Continue current medications   Increase walking or other exercise  Leg exercise daily  Call  [phone] or use [employer] to schedule a future lab appointment  non-fasting in 3 months.   See me in 6 months regarding chronic pain management or earlier as needed or based on lab result recommendations in 3 months   I would recommend a covid booster vaccination. You may have it done at any pharmacy. If you wish to have it done at a [place] pharmacy, then go to [email or web address] to schedule a vaccination appointment  I would recommend you receive an influenza (flu) vaccine  this Fall (mid/late October)              (Chart documentation was completed, in part, with [employer] voice-recognition software. Even though reviewed, some grammatical, spelling, and word errors may remain.)    Luigi Driscoll MD  Internal Medicine Department  [facility]      Netta Fountain is a 77 year old accompanied by her daughter, presenting for the following health issues:  No chief  complaint on file.      History of Present Illness       Diabetes:   She presents for follow up of diabetes.  She is checking home blood glucose two times daily. She checks blood glucose before meals and at bedtime.  Blood glucose is sometimes over 200 and never under 70. She is aware of hypoglycemia symptoms including shakiness, dizziness, weakness and confusion. She is concerned about other. She is having numbness in feet, burning in feet and blurry vision.         Hypertension: She presents for follow up of hypertension.  She does not check blood pressure  regularly outside of the clinic. Outside blood pressures have been over 140/90. She does not follow a low salt diet.    Today's SHARAD-7 Score: 6        Most recent lab results reviewed with pt.       Sugars:   Fasting average 5 days = 124      Sugars   97,147,x,x  106  113  83  156  101  151, 146  X,104  90  122  167  105    Component      Latest Ref Rng & Units 9/17/2021 3/3/2022 6/21/2022   Sodium      133 - 144 mmol/L  140    Potassium      3.4 - 5.3 mmol/L  4.7    Chloride      94 - 109 mmol/L  107    Carbon Dioxide      20 - 32 mmol/L  31    Anion Gap      3 - 14 mmol/L  2 (L)    Urea Nitrogen      7 - 30 mg/dL  11    Creatinine      0.52 - 1.04 mg/dL  0.78    Calcium      8.5 - 10.1 mg/dL  9.5    Glucose      70 - 99 mg/dL  99    Alkaline Phosphatase      40 - 150 U/L  71    AST      0 - 45 U/L  11    ALT      0 - 50 U/L  21    Protein Total      6.8 - 8.8 g/dL  7.4    Albumin      3.4 - 5.0 g/dL  3.6    Bilirubin Total      0.2 - 1.3 mg/dL  0.4    GFR Estimate      >60 mL/min/1.73m2  79    Cholesterol      <200 mg/dL  139    Triglycerides      <150 mg/dL  119    HDL Cholesterol      >=50 mg/dL  72    LDL Cholesterol Calculated      <=100 mg/dL  43    Non HDL Cholesterol      <130 mg/dL  67    Patient Fasting > 8hrs?        Yes    Creatinine Urine      mg/dL  84    Albumin Urine mg/L      mg/L  7    Albumin Urine mg/g Cr      0.00 - 25.00 mg/g Cr  8.33   "  Hemoglobin A1C      0.0 - 5.6 % 7.7 (H) 7.3 (H) 7.9 (H)   Vitamin D Deficiency screening      20 - 75 ug/L  43      Denies CP, SOB, abdominal pain, polyuria, polydipsia, vision changes, extremity numbness/parasthesias or skin problems.  Stable chronic low back pain issues.  Patient taking tramadol 1 tablet twice a day in addition to Celebrex.  Denies constipation issues.  Energy okay  Weight up 4 pounds last visit.  Has not been exercising recently.  Eating more in general    Additional ROS:   Constitutional, HEENT, Cardiovascular, Pulmonary, GI and , Neuro, MSK and Psych review of systems/symptoms are otherwise negative or unchanged from previous, except as noted above.      OBJECTIVE:  /80   Pulse 71   Resp 16   Wt 89.2 kg (196 lb 9.6 oz)   SpO2 96%   BMI 35.96 kg/m     Estimated body mass index is 35.96 kg/m  as calculated from the following:    Height as of 3/3/22: 1.575 m (5' 2\").    Weight as of this encounter: 89.2 kg (196 lb 9.6 oz).     Neck: no adenopathy. Thyroid normal to palpation. No bruits  Pulm: Lungs clear to auscultation   CV: Regular rates and rhythm  GI: Soft, obese, nontender, Normal active bowel sounds, No hepatosplenomegaly or masses palpable  Ext: Peripheral pulses intact. Minimal BLE edema. Normal  foot exam. No ulcerations  Neuro: Normal strength and tone, sensory exam grossly normal  Back: Tenderness to palpation bilateral paralumbar musculature.  Negative straight leg raise test bilaterally                 .  ..  " yes

## 2022-06-27 NOTE — PATIENT INSTRUCTIONS
Continue current medications   Increase walking or other exercise  Leg exercise daily  Call  [phone] or use [employer] to schedule a future lab appointment  non-fasting in 3 months.   See me in 6 months regarding chronic pain management or earlier as needed or based on lab result recommendations in 3 months   I would recommend a covid booster vaccination. You may have it done at any pharmacy. If you wish to have it done at a [place] pharmacy, then go to [email or web address] to schedule a vaccination appointment  I would recommend you receive an influenza (flu) vaccine  this Fall (mid/late October)

## 2022-07-11 DIAGNOSIS — M54.41 CHRONIC RIGHT-SIDED LOW BACK PAIN WITH RIGHT-SIDED SCIATICA: ICD-10-CM

## 2022-07-11 DIAGNOSIS — G89.29 CHRONIC RIGHT-SIDED LOW BACK PAIN WITH RIGHT-SIDED SCIATICA: ICD-10-CM

## 2022-07-11 DIAGNOSIS — F11.90 CHRONIC, CONTINUOUS USE OF OPIOIDS: ICD-10-CM

## 2022-07-11 RX ORDER — TRAMADOL HYDROCHLORIDE 50 MG/1
TABLET ORAL
Qty: 60 TABLET | Refills: 2 | Status: SHIPPED | OUTPATIENT
Start: 2022-07-11 | End: 2022-10-14

## 2022-07-12 NOTE — TELEPHONE ENCOUNTER
Timing of RF appropriate to be done 7/14/22.  UTD re: clinic visit/evisit every 3 months. [place]  site [place] and [place] substance med contract on file. Rx electronically sent to pharmacy. Pt notified via [facility]

## 2022-09-03 ENCOUNTER — HEALTH MAINTENANCE LETTER (OUTPATIENT)
Age: 77
End: 2022-09-03

## 2022-09-05 DIAGNOSIS — E11.21 TYPE 2 DIABETES MELLITUS WITH DIABETIC NEPHROPATHY, WITH LONG-TERM CURRENT USE OF INSULIN (H): ICD-10-CM

## 2022-09-05 DIAGNOSIS — M54.41 CHRONIC RIGHT-SIDED LOW BACK PAIN WITH RIGHT-SIDED SCIATICA: ICD-10-CM

## 2022-09-05 DIAGNOSIS — G89.29 CHRONIC RIGHT-SIDED LOW BACK PAIN WITH RIGHT-SIDED SCIATICA: ICD-10-CM

## 2022-09-05 DIAGNOSIS — Z79.4 TYPE 2 DIABETES MELLITUS WITH DIABETIC NEPHROPATHY, WITH LONG-TERM CURRENT USE OF INSULIN (H): ICD-10-CM

## 2022-09-07 RX ORDER — GABAPENTIN 300 MG/1
CAPSULE ORAL
Qty: 270 CAPSULE | Refills: 3 | Status: SHIPPED | OUTPATIENT
Start: 2022-09-07 | End: 2023-03-24

## 2022-09-07 RX ORDER — PEN NEEDLE, DIABETIC 32 GX 1/4"
NEEDLE, DISPOSABLE MISCELLANEOUS
Refills: 0 | OUTPATIENT
Start: 2022-09-07

## 2022-09-07 NOTE — TELEPHONE ENCOUNTER
Rx denied to the pharmacy. Called and spoke to the pharmacy staff. Script sent 3/5/22 for 100 needles with 3 additional refills on file. Pharmacy confirmed they have received the script and patient has refills on file.     Elsie Baxter RN

## 2022-09-07 NOTE — TELEPHONE ENCOUNTER
Routing refill request to provider for review/approval because:  Drug not on the [facility] refill protocol     Elsie Baxter RN

## 2022-09-27 ENCOUNTER — LAB (OUTPATIENT)
Dept: LAB | Facility: CLINIC | Age: 77
End: 2022-09-27
Payer: COMMERCIAL

## 2022-09-27 DIAGNOSIS — E11.21 TYPE 2 DIABETES MELLITUS WITH DIABETIC NEPHROPATHY, WITH LONG-TERM CURRENT USE OF INSULIN (H): ICD-10-CM

## 2022-09-27 DIAGNOSIS — Z79.4 TYPE 2 DIABETES MELLITUS WITH DIABETIC NEPHROPATHY, WITH LONG-TERM CURRENT USE OF INSULIN (H): ICD-10-CM

## 2022-09-27 DIAGNOSIS — I10 ESSENTIAL HYPERTENSION WITH GOAL BLOOD PRESSURE LESS THAN 130/80: ICD-10-CM

## 2022-09-27 LAB — HBA1C MFR BLD: 7.9 % (ref 0–5.6)

## 2022-09-27 PROCEDURE — 83036 HEMOGLOBIN GLYCOSYLATED A1C: CPT

## 2022-09-27 PROCEDURE — 36415 COLL VENOUS BLD VENIPUNCTURE: CPT

## 2022-09-27 PROCEDURE — 80048 BASIC METABOLIC PNL TOTAL CA: CPT

## 2022-09-28 LAB
ANION GAP SERPL CALCULATED.3IONS-SCNC: 6 MMOL/L (ref 3–14)
BUN SERPL-MCNC: 21 MG/DL (ref 7–30)
CALCIUM SERPL-MCNC: 9.1 MG/DL (ref 8.5–10.1)
CHLORIDE BLD-SCNC: 105 MMOL/L (ref 94–109)
CO2 SERPL-SCNC: 26 MMOL/L (ref 20–32)
CREAT SERPL-MCNC: 0.62 MG/DL (ref 0.52–1.04)
GFR SERPL CREATININE-BSD FRML MDRD: >90 ML/MIN/1.73M2
GLUCOSE BLD-MCNC: 152 MG/DL (ref 70–99)
POTASSIUM BLD-SCNC: 4.6 MMOL/L (ref 3.4–5.3)
SODIUM SERPL-SCNC: 137 MMOL/L (ref 133–144)

## 2022-10-14 DIAGNOSIS — G89.29 CHRONIC RIGHT-SIDED LOW BACK PAIN WITH RIGHT-SIDED SCIATICA: ICD-10-CM

## 2022-10-14 DIAGNOSIS — M54.41 CHRONIC RIGHT-SIDED LOW BACK PAIN WITH RIGHT-SIDED SCIATICA: ICD-10-CM

## 2022-10-14 DIAGNOSIS — F11.90 CHRONIC, CONTINUOUS USE OF OPIOIDS: ICD-10-CM

## 2022-10-14 RX ORDER — TRAMADOL HYDROCHLORIDE 50 MG/1
TABLET ORAL
Qty: 60 TABLET | Refills: 2 | Status: SHIPPED | OUTPATIENT
Start: 2022-10-14 | End: 2023-01-11

## 2022-10-14 NOTE — TELEPHONE ENCOUNTER
Timing of RF appropriate.  UTD re: clinic visit/evisit (with current level of clinic overcrowded scheduling and stability with controlled substance prescription dosing use, patient being seen every 6 months). [place]  site [place] and [place] substance med contract on file. Rx electronically sent to pharmacy

## 2022-10-14 NOTE — TELEPHONE ENCOUNTER
Patient's daughter called and is requesting this medication be filled today as patient is out. Routing high priority to PCP.    Sparkle June RN  [facility] Triage Team

## 2022-11-01 ENCOUNTER — TRANSFERRED RECORDS (OUTPATIENT)
Dept: MULTI SPECIALTY CLINIC | Facility: CLINIC | Age: 77
End: 2022-11-01

## 2022-11-01 LAB — RETINOPATHY: NORMAL

## 2022-11-21 ENCOUNTER — TRANSFERRED RECORDS (OUTPATIENT)
Dept: HEALTH INFORMATION MANAGEMENT | Facility: CLINIC | Age: 77
End: 2022-11-21

## 2022-11-30 ENCOUNTER — NURSE TRIAGE (OUTPATIENT)
Dept: INTERNAL MEDICINE | Facility: CLINIC | Age: 77
End: 2022-11-30

## 2022-11-30 NOTE — TELEPHONE ENCOUNTER
"Pt and her daughter called reporting the patient has had a  moderate cough and difficulty clearing her throat x 2 weeks. Home care advice given, pt agrees to call back for an appointment if symptoms don't improve or if they worsen. Discussed OTC cough medications, will discuss with pharmacist if needed.     1. ONSET: \"When did the cough begin?\"       2 weeks   2. SEVERITY: \"How bad is the cough today?\"       Moderate; coughing almost every hour < 1 min until her throat is cleared. Feels like mucus is stuck in the back of her throat and she can't clear it.   3. SPUTUM: \"Describe the color of your sputum\" (none, dry cough; clear, white, yellow, green)      Non productive  4. HEMOPTYSIS: \"Are you coughing up any blood?\" If so ask: \"How much?\" (flecks, streaks, tablespoons, etc.)      Not applicable   5. DIFFICULTY BREATHING: \"Are you having difficulty breathing?\" If Yes, ask: \"How bad is it?\" (e.g., mild, moderate, severe)     - MILD: No SOB at rest, mild SOB with walking, speaks normally in sentences, can lie down, no retractions, pulse < 100.     - MODERATE: SOB at rest, SOB with minimal exertion and prefers to sit, cannot lie down flat, speaks in phrases, mild retractions, audible wheezing, pulse 100-120.     - SEVERE: Very SOB at rest, speaks in single words, struggling to breathe, sitting hunched forward, retractions, pulse > 120       denies  6. FEVER: \"Do you have a fever?\" If Yes, ask: \"What is your temperature, how was it measured, and when did it start?\"      denies  7. CARDIAC HISTORY: \"Do you have any history of heart disease?\" (e.g., heart attack, congestive heart failure)       Hypertension, sleep apnea  8. LUNG HISTORY: \"Do you have any history of lung disease?\"  (e.g., pulmonary embolus, asthma, emphysema)      Denies. Take allegra for seasonal allergies  9. PE RISK FACTORS: \"Do you have a history of blood clots?\" (or: recent major surgery, recent prolonged travel, bedridden)      denies  10. OTHER " "SYMPTOMS: \"Do you have any other symptoms?\" (e.g., runny nose, wheezing, chest pain)        Wheezing addressed in the past and pt reporting that it is the same, no changes and will go into be assessed if wheezing is different; partially relieved by Allegra.  11. PREGNANCY: \"Is there any chance you are pregnant?\" \"When was your last menstrual period?\"          12. TRAVEL: \"Have you traveled out of the country in the last month?\" (e.g., travel history, exposures)        denies    Reason for Disposition    Cough with cold symptoms (e.g., runny nose, postnasal drip, throat clearing)    Additional Information    Negative: Bluish (or gray) lips or face    Negative: SEVERE difficulty breathing (e.g., struggling for each breath, speaks in single words)    Negative: Rapid onset of cough and has hives    Negative: Coughing started suddenly after medicine, an allergic food or bee sting    Negative: Difficulty breathing after exposure to flames, smoke, or fumes    Negative: Sounds like a life-threatening emergency to the triager    Negative: Previous asthma attacks and this feels like asthma attack    Negative: Dry cough (non-productive; no sputum or minimal clear sputum) and within 14 days of COVID-19 Exposure    Negative: MODERATE difficulty breathing (e.g., speaks in phrases, SOB even at rest, pulse 100-120) and still present when not coughing    Negative: Chest pain present when not coughing    Negative: Passed out (i.e., fainted, collapsed and was not responding)    Negative: Patient sounds very sick or weak to the triager    Negative: Increasing ankle swelling    Negative: Fever > 100.0 F (37.8 C) and bedridden (e.g., nursing home patient, stroke, chronic illness, recovering from surgery)    Negative: Fever > 100.0 F (37.8 C) and has diabetes mellitus or a weak immune system (e.g., HIV positive, cancer chemotherapy, organ transplant, splenectomy, chronic steroids)    Negative: Fever > 101 F (38.3 C) and over 60 years of " age    Negative: Fever > 103 F (39.4 C)    Negative: Coughed up > 1 tablespoon (15 ml) blood (Exception: Blood-tinged sputum.)    Negative: MILD difficulty breathing (e.g., minimal/no SOB at rest, SOB with walking, pulse <100) and still present when not coughing    Negative: Wheezing is present    Negative: SEVERE coughing spells (e.g., whooping sound after coughing, vomiting after coughing)    Negative: Coughing up ruby-colored (reddish-brown) or blood-tinged sputum    Negative: Fever present > 3 days (72 hours)    Negative: Fever returns after gone for over 24 hours and symptoms worse or not improved    Negative: Using nasal washes and pain medicine > 24 hours and sinus pain persists    Negative: Known COPD or other severe lung disease (i.e., bronchiectasis, cystic fibrosis, lung surgery) and worsening symptoms (i.e., increased sputum purulence or amount, increased breathing difficulty)    Negative: Continuous (nonstop) coughing interferes with work or school and no improvement using cough treatment per Care Advice    Negative: Patient wants to be seen    Negative: Cough has been present for > 3 weeks    Negative: Allergy symptoms are also present (e.g., itchy eyes, clear nasal discharge, postnasal drip)    Negative: Nasal discharge present > 10 days    Negative: Exposure to TB (Tuberculosis)    Negative: Taking an ACE Inhibitor medication (e.g., benazepril/LOTENSIN, captopril/CAPOTEN, enalapril/VASOTEC, lisinopril/ZESTRIL)    Negative: Cough with no complications    Protocols used: COUGH-A-OH

## 2022-12-07 DIAGNOSIS — G47.33 OSA (OBSTRUCTIVE SLEEP APNEA): Primary | ICD-10-CM

## 2022-12-23 ENCOUNTER — OFFICE VISIT (OUTPATIENT)
Dept: INTERNAL MEDICINE | Facility: CLINIC | Age: 77
End: 2022-12-23
Payer: COMMERCIAL

## 2022-12-23 VITALS
BODY MASS INDEX: 36.64 KG/M2 | OXYGEN SATURATION: 98 % | HEART RATE: 71 BPM | TEMPERATURE: 98.1 F | DIASTOLIC BLOOD PRESSURE: 74 MMHG | SYSTOLIC BLOOD PRESSURE: 138 MMHG | WEIGHT: 200.3 LBS

## 2022-12-23 DIAGNOSIS — E66.01 SEVERE OBESITY WITH BODY MASS INDEX (BMI) OF 35.0 TO 39.9 WITH COMORBIDITY (H): ICD-10-CM

## 2022-12-23 DIAGNOSIS — F11.90 CHRONIC, CONTINUOUS USE OF OPIOIDS: ICD-10-CM

## 2022-12-23 DIAGNOSIS — Z79.4 TYPE 2 DIABETES MELLITUS WITH DIABETIC NEPHROPATHY, WITH LONG-TERM CURRENT USE OF INSULIN (H): Primary | ICD-10-CM

## 2022-12-23 DIAGNOSIS — E11.21 TYPE 2 DIABETES MELLITUS WITH DIABETIC NEPHROPATHY, WITH LONG-TERM CURRENT USE OF INSULIN (H): Primary | ICD-10-CM

## 2022-12-23 LAB
CREAT UR-MCNC: 50 MG/DL
HBA1C MFR BLD: 8.3 % (ref 0–5.6)

## 2022-12-23 PROCEDURE — 80307 DRUG TEST PRSMV CHEM ANLYZR: CPT | Performed by: INTERNAL MEDICINE

## 2022-12-23 PROCEDURE — 36415 COLL VENOUS BLD VENIPUNCTURE: CPT | Performed by: INTERNAL MEDICINE

## 2022-12-23 PROCEDURE — 99214 OFFICE O/P EST MOD 30 MIN: CPT | Performed by: INTERNAL MEDICINE

## 2022-12-23 PROCEDURE — 83036 HEMOGLOBIN GLYCOSYLATED A1C: CPT | Performed by: INTERNAL MEDICINE

## 2022-12-23 NOTE — PATIENT INSTRUCTIONS
Labs as ordered    I would recommend a covid booster  And flu vaccination. You may have it done at any pharmacy. If you wish to have it done at a [place] pharmacy, then go to [email or web address] to schedule a vaccination appointment  Reduce calorie/carbohydrate (sugar, bread, potato, pasta, rice, alcohol etc)  intake in diet.  Increase color on your plate with vegetables. Increase  frequency of walking or other aerobic exercise as able (goal is daily)  If the A1C is > 7.5, possible use of Farxiga or Ozempic if covered by insurance and affordable for diabetic control to allow for better blood sugar control and possible reduction insulin doses to also help with weight loss  Continue current medications for now pending lab results

## 2022-12-23 NOTE — PROGRESS NOTES
ASSESSMENT:   1. Type 2 diabetes mellitus with diabetic nephropathy, with long-term current use of insulin (H)  Average blood sugars at goal but previous A1c borderline elevated for goal A1c 7.5 with age.  Recheck lab.  If A1c remains elevated, possible Farxiga or Ozempic medications to allow for better blood sugar control and reduction insulin dosing.  See plan discussion below  - Hemoglobin A1c; Future  - Hemoglobin A1c    2. Chronic, continuous use of opioids  Stable pain control with tramadol.  Due for urine drug screen testing. [place].   - [id number] - Urine Drug Confirmation Panel (Comprehensive); Future  - [id number] - Urine Drug Confirmation Panel (Comprehensive)    3. Severe obesity with body mass index (BMI) of 35.0 to 39.9 with comorbidity (H)  Weight up 10 pounds.  Likely contributed by insulin in addition to diet.  Counseled regarding calorie/carbohydrate reduction.  Possible Farxiga or Ozempic as below      PLAN:    Labs as ordered    I would recommend a covid booster  And flu vaccination. You may have it done at any pharmacy. If you wish to have it done at a [place] pharmacy, then go to [email or web address] to schedule a vaccination appointment  Reduce calorie/carbohydrate (sugar, bread, potato, pasta, rice, alcohol etc)  intake in diet.  Increase color on your plate with vegetables. Increase  frequency of walking or other aerobic exercise as able (goal is daily)  If the A1C is > 7.5, possible use of Farxiga or Ozempic if covered by insurance and affordable for diabetic control to allow for better blood sugar control and possible reduction insulin doses to also help with weight loss  Continue current medications for now pending lab results        (Chart documentation was completed, in part, with [employer] voice-recognition software. Even though reviewed, some grammatical, spelling, and word errors may remain.)    Luigi Driscoll MD  Internal Medicine Department  [facility]  [facility] is a 77 year old accompanied by her daughter, presenting for the following health issues:  Diabetes      HPI     Diabetes Follow-up    How often are you checking your blood sugar? One time daily  What time of day are you checking your blood sugars (select all that apply)?  in the morning  Have you had any blood sugars above 200?  Yes 209  Have you had any blood sugars below 70?  No    What symptoms do you notice when your blood sugar is low?  Other: tingling in hand, swelling in hand, itching In hand, hand feels heavy, blurred vision is rare    What concerns do you have today about your diabetes? None and Other: lower a1c     Do you have any of these symptoms? (Select all that apply)  Burning in feet and Weight gain, tingling in feet    Have you had a diabetic eye exam in the last 12 months? Yes- Date of last eye exam: 11/2022,  Location: [facility]        BP Readings from Last 2 Encounters:   06/27/22 138/80   05/27/22 122/70     Hemoglobin A1C (%)   Date Value   09/27/2022 7.9 (H)   06/21/2022 7.9 (H)   06/02/2021 7.5 (H)   01/26/2021 7.3 (H)     LDL Cholesterol Calculated (mg/dL)   Date Value   03/03/2022 43   06/02/2021 50   07/23/2020 31      Most recent lab results reviewed with pt.      Lab Results   Component Value Date     09/27/2022     06/02/2021     Lab Results   Component Value Date    A1C 7.9 09/27/2022    A1C 7.5 06/02/2021     Lab Results   Component Value Date    CHOL 139 03/03/2022    CHOL 144 06/02/2021     Lab Results   Component Value Date    LDL 43 03/03/2022    LDL 50 06/02/2021     Lab Results   Component Value Date    HDL 72 03/03/2022    HDL 72 06/02/2021     Lab Results   Component Value Date    TRIG 119 03/03/2022    TRIG 109 06/02/2021     Lab Results   Component Value Date    CR 0.62 09/27/2022    CR 0.74 06/02/2021     Lab Results   Component Value Date    ALT 21 03/03/2022    ALT 23 06/02/2021     Lab Results  "  Component Value Date    AST 11 03/03/2022    AST 13 06/02/2021     Lab Results   Component Value Date    MICROL 7 03/03/2022    MICROL <5 06/02/2021     Lab Results   Component Value Date    TSH 3.14 12/26/2019     Denies CP, SOB, abdominal pain, polyuria, polydipsia,   or skin problems.  Eye exam 1 month ago. Has bilateral cataracts but mild and not needing surgery. Has some bilateral ptosis and will be seeing eye doctor again next month for consult re: blepharoplasty. Affecting superior eye fields  7-day blood sugar average 162.  14-day average 145 and 30-day average 155  Exercising mainly at the [facility]  Moderate compliance with diabetic diet.  Weight up 10 pounds in 1 year  Stable chronic back pain and joint pain control with use of tramadol.  Due for urine drug testing.  Has been compliant with use. [place]    Additional ROS:   Constitutional, HEENT, Cardiovascular, Pulmonary, GI and , Neuro, MSK and Psych review of systems/symptoms are otherwise negative or unchanged from previous, except as noted above.      OBJECTIVE:  /74   Pulse 71   Temp 98.1  F (36.7  C) (Temporal)   Wt 90.9 kg (200 lb 4.8 oz)   SpO2 98%   BMI 36.64 kg/m     Estimated body mass index is 35.96 kg/m  as calculated from the following:    Height as of 3/3/22: 1.575 m (5' 2\").    Weight as of 6/27/22: 89.2 kg (196 lb 9.6 oz).     Neck: no adenopathy. Thyroid normal to palpation. No bruits  Pulm: Lungs clear to auscultation   CV: Regular rates and rhythm  GI: Soft, obese, nontender, Normal active bowel sounds, No hepatosplenomegaly or masses palpable  Ext: Peripheral pulses intact. Trace BLE edema.  Neuro: Normal strength and tone, sensory exam mildly reduced light touch sensation distal bilateral lower extremity  Back: Tenderness to palpation bilateral paralumbar musculature.  Negative straight leg raise test bilaterally             "

## 2022-12-27 LAB
GABAPENTIN UR QL CFM: PRESENT
N-NORTRAMADOL/CREAT UR CFM: ABNORMAL NG/MG {CREAT}
O-NORTRAMADOL UR CFM-MCNC: 7780 NG/ML
TRAMADOL CTO UR CFM-MCNC: 6780 NG/ML
TRAMADOL/CREAT UR: ABNORMAL NG/MG {CREAT}

## 2023-01-10 ENCOUNTER — IMMUNIZATION (OUTPATIENT)
Dept: NURSING | Facility: CLINIC | Age: 78
End: 2023-01-10
Payer: COMMERCIAL

## 2023-01-10 DIAGNOSIS — G89.29 CHRONIC RIGHT-SIDED LOW BACK PAIN WITH RIGHT-SIDED SCIATICA: ICD-10-CM

## 2023-01-10 DIAGNOSIS — M54.41 CHRONIC RIGHT-SIDED LOW BACK PAIN WITH RIGHT-SIDED SCIATICA: ICD-10-CM

## 2023-01-10 DIAGNOSIS — F11.90 CHRONIC, CONTINUOUS USE OF OPIOIDS: ICD-10-CM

## 2023-01-10 PROCEDURE — 91312 COVID-19 VACCINE BIVALENT BOOSTER 12+ (PFIZER): CPT

## 2023-01-10 PROCEDURE — 90662 IIV NO PRSV INCREASED AG IM: CPT

## 2023-01-10 PROCEDURE — 0124A COVID-19 VACCINE BIVALENT BOOSTER 12+ (PFIZER): CPT

## 2023-01-10 PROCEDURE — 90471 IMMUNIZATION ADMIN: CPT

## 2023-01-11 RX ORDER — TRAMADOL HYDROCHLORIDE 50 MG/1
TABLET ORAL
Qty: 60 TABLET | Refills: 0 | Status: SHIPPED | OUTPATIENT
Start: 2023-01-11 | End: 2023-02-08

## 2023-02-07 DIAGNOSIS — M54.41 CHRONIC RIGHT-SIDED LOW BACK PAIN WITH RIGHT-SIDED SCIATICA: ICD-10-CM

## 2023-02-07 DIAGNOSIS — F11.90 CHRONIC, CONTINUOUS USE OF OPIOIDS: ICD-10-CM

## 2023-02-07 DIAGNOSIS — G89.29 CHRONIC RIGHT-SIDED LOW BACK PAIN WITH RIGHT-SIDED SCIATICA: ICD-10-CM

## 2023-02-08 RX ORDER — TRAMADOL HYDROCHLORIDE 50 MG/1
TABLET ORAL
Qty: 60 TABLET | Refills: 4 | Status: SHIPPED | OUTPATIENT
Start: 2023-02-08 | End: 2023-07-12

## 2023-03-14 DIAGNOSIS — R00.2 PALPITATIONS: ICD-10-CM

## 2023-03-14 DIAGNOSIS — Z79.4 TYPE 2 DIABETES MELLITUS WITH DIABETIC NEPHROPATHY, WITH LONG-TERM CURRENT USE OF INSULIN (H): ICD-10-CM

## 2023-03-14 DIAGNOSIS — I10 ESSENTIAL HYPERTENSION WITH GOAL BLOOD PRESSURE LESS THAN 130/80: ICD-10-CM

## 2023-03-14 DIAGNOSIS — E11.21 TYPE 2 DIABETES MELLITUS WITH DIABETIC NEPHROPATHY, WITH LONG-TERM CURRENT USE OF INSULIN (H): ICD-10-CM

## 2023-03-14 RX ORDER — LOSARTAN POTASSIUM 100 MG/1
TABLET ORAL
Qty: 90 TABLET | Refills: 0 | Status: SHIPPED | OUTPATIENT
Start: 2023-03-14 | End: 2023-06-13

## 2023-03-14 RX ORDER — AMLODIPINE BESYLATE 5 MG/1
TABLET ORAL
Qty: 90 TABLET | Refills: 0 | Status: SHIPPED | OUTPATIENT
Start: 2023-03-14 | End: 2023-05-30

## 2023-03-14 RX ORDER — BISOPROLOL FUMARATE 5 MG/1
TABLET, FILM COATED ORAL
Qty: 90 TABLET | Refills: 0 | Status: SHIPPED | OUTPATIENT
Start: 2023-03-14 | End: 2023-06-13

## 2023-03-14 RX ORDER — INSULIN DETEMIR 100 [IU]/ML
INJECTION, SOLUTION SUBCUTANEOUS
Qty: 15 ML | Refills: 0 | Status: SHIPPED | OUTPATIENT
Start: 2023-03-14 | End: 2023-03-16

## 2023-03-16 ENCOUNTER — OFFICE VISIT (OUTPATIENT)
Dept: INTERNAL MEDICINE | Facility: CLINIC | Age: 78
End: 2023-03-16
Payer: COMMERCIAL

## 2023-03-16 VITALS
HEART RATE: 67 BPM | BODY MASS INDEX: 35.96 KG/M2 | OXYGEN SATURATION: 100 % | DIASTOLIC BLOOD PRESSURE: 72 MMHG | TEMPERATURE: 97.6 F | WEIGHT: 196.6 LBS | SYSTOLIC BLOOD PRESSURE: 136 MMHG

## 2023-03-16 DIAGNOSIS — G89.29 CHRONIC RIGHT-SIDED LOW BACK PAIN WITHOUT SCIATICA: ICD-10-CM

## 2023-03-16 DIAGNOSIS — G56.03 BILATERAL CARPAL TUNNEL SYNDROME: ICD-10-CM

## 2023-03-16 DIAGNOSIS — M54.50 CHRONIC RIGHT-SIDED LOW BACK PAIN WITHOUT SCIATICA: ICD-10-CM

## 2023-03-16 DIAGNOSIS — E66.01 SEVERE OBESITY WITH BODY MASS INDEX (BMI) OF 35.0 TO 39.9 WITH COMORBIDITY (H): ICD-10-CM

## 2023-03-16 DIAGNOSIS — L30.9 DERMATITIS: ICD-10-CM

## 2023-03-16 DIAGNOSIS — E78.5 HYPERLIPIDEMIA LDL GOAL <100: ICD-10-CM

## 2023-03-16 DIAGNOSIS — E11.21 TYPE 2 DIABETES MELLITUS WITH DIABETIC NEPHROPATHY, WITH LONG-TERM CURRENT USE OF INSULIN (H): ICD-10-CM

## 2023-03-16 DIAGNOSIS — H02.403 PTOSIS OF BOTH EYELIDS: ICD-10-CM

## 2023-03-16 DIAGNOSIS — Z79.4 TYPE 2 DIABETES MELLITUS WITH DIABETIC NEPHROPATHY, WITH LONG-TERM CURRENT USE OF INSULIN (H): ICD-10-CM

## 2023-03-16 LAB
ALBUMIN SERPL BCG-MCNC: 4.3 G/DL (ref 3.5–5.2)
ALP SERPL-CCNC: 73 U/L (ref 35–104)
ALT SERPL W P-5'-P-CCNC: 17 U/L (ref 10–35)
ANION GAP SERPL CALCULATED.3IONS-SCNC: 12 MMOL/L (ref 7–15)
AST SERPL W P-5'-P-CCNC: 19 U/L (ref 10–35)
BILIRUB SERPL-MCNC: 0.3 MG/DL
BUN SERPL-MCNC: 15.3 MG/DL (ref 8–23)
CALCIUM SERPL-MCNC: 9.7 MG/DL (ref 8.8–10.2)
CHLORIDE SERPL-SCNC: 103 MMOL/L (ref 98–107)
CHOLEST SERPL-MCNC: 160 MG/DL
CREAT SERPL-MCNC: 0.81 MG/DL (ref 0.51–0.95)
CREAT UR-MCNC: 42.2 MG/DL
DEPRECATED HCO3 PLAS-SCNC: 27 MMOL/L (ref 22–29)
GFR SERPL CREATININE-BSD FRML MDRD: 74 ML/MIN/1.73M2
GLUCOSE SERPL-MCNC: 184 MG/DL (ref 70–99)
HBA1C MFR BLD: 8.1 % (ref 0–5.6)
HDLC SERPL-MCNC: 66 MG/DL
LDLC SERPL CALC-MCNC: 58 MG/DL
MICROALBUMIN UR-MCNC: 19.2 MG/L
MICROALBUMIN/CREAT UR: 45.5 MG/G CR (ref 0–25)
NONHDLC SERPL-MCNC: 94 MG/DL
POTASSIUM SERPL-SCNC: 4.6 MMOL/L (ref 3.4–5.3)
PROT SERPL-MCNC: 7.5 G/DL (ref 6.4–8.3)
SODIUM SERPL-SCNC: 142 MMOL/L (ref 136–145)
TRIGL SERPL-MCNC: 179 MG/DL

## 2023-03-16 PROCEDURE — 36415 COLL VENOUS BLD VENIPUNCTURE: CPT | Performed by: INTERNAL MEDICINE

## 2023-03-16 PROCEDURE — 82570 ASSAY OF URINE CREATININE: CPT | Performed by: INTERNAL MEDICINE

## 2023-03-16 PROCEDURE — 83036 HEMOGLOBIN GLYCOSYLATED A1C: CPT | Performed by: INTERNAL MEDICINE

## 2023-03-16 PROCEDURE — 82043 UR ALBUMIN QUANTITATIVE: CPT | Performed by: INTERNAL MEDICINE

## 2023-03-16 PROCEDURE — 80061 LIPID PANEL: CPT | Performed by: INTERNAL MEDICINE

## 2023-03-16 PROCEDURE — 99214 OFFICE O/P EST MOD 30 MIN: CPT | Performed by: INTERNAL MEDICINE

## 2023-03-16 PROCEDURE — 80053 COMPREHEN METABOLIC PANEL: CPT | Performed by: INTERNAL MEDICINE

## 2023-03-16 RX ORDER — VALACYCLOVIR HYDROCHLORIDE 1 G/1
1000 TABLET, FILM COATED ORAL 3 TIMES DAILY
Qty: 21 TABLET | Refills: 0 | Status: SHIPPED | OUTPATIENT
Start: 2023-03-16 | End: 2023-04-28

## 2023-03-16 RX ORDER — INSULIN DETEMIR 100 [IU]/ML
INJECTION, SOLUTION SUBCUTANEOUS
Qty: 45 ML | Refills: 0 | Status: SHIPPED | OUTPATIENT
Start: 2023-03-16 | End: 2023-08-29

## 2023-03-16 NOTE — PATIENT INSTRUCTIONS
Increase Levemir to 38 units daily in the evening  Valacyclovir 1000 mg tablet, 1 tablet 3 times a day for 7 days for low back skin rash  Continue other medications  Labs as ordered  Referral to [facility] hand surgeon regarding carpal tunnel.  Central schedulers will call you or  you may contact the [facility] Representative at: [phone].  With eye surgery clinic regarding possible future blepharoplasty eyelid surgery for droopiness/ptosis.  If decide to proceed with surgery, then contact clinic to schedule a preop appointment within 30 days of surgery.  If no appointments available, then send me note in [employer] and I will get you worked into the schedule  Continue other medications  Controlled substance agreement signed with use of tramadol

## 2023-03-16 NOTE — PROGRESS NOTES
ASSESSMENT:    1. Type 2 diabetes mellitus with diabetic nephropathy, with long-term current use of insulin (H)  Needs improved control.  Increase Levemir to 38 units daily.  Continue other medications.  Labs today as ordered  - insulin detemir (LEVEMIR FLEXPEN/FLEXTOUCH) 100 UNIT/ML pen; INJECT 38 UNITS SUBCUTANEOUSLY IN THE EVENING  Dispense: 45 mL; Refill: 0  - Hemoglobin A1c; Future  - Comprehensive metabolic panel; Future  - Albumin Random Urine Quantitative with Creat Ratio; Future  - Hemoglobin A1c  - Comprehensive metabolic panel  - Albumin Random Urine Quantitative with Creat Ratio    2. Chronic right-sided low back pain without sciatica  Stable.  Continue tramadol therapy.  [place]  reviewed and ap[appropriate     3. Hyperlipidemia LDL goal <100  On statin therapy.  Due for lab follow-up  - Lipid panel reflex to direct LDL Non-fasting; Future  - Lipid panel reflex to direct LDL Non-fasting    4. Ptosis of both eyelids  Mild bilateral ptosis.  Patient has not decided if wishes to proceed with blepharoplasty yet    5. Bilateral carpal tunnel syndrome  Previously confirmed with EMG.  Patient to see hand surgeon for further eval and treat  - [facility] Referral; Future    6. Dermatitis  Symptoms appear dry vesicular but cross midline and nontender currently.  Atypical shingles crossing midline versus other herpetic rash.  Will treat with valacyclovir for 1 week  - valACYclovir (VALTREX) 1000 mg tablet; Take 1 tablet (1,000 mg) by mouth 3 times daily for 7 days  Dispense: 21 tablet; Refill: 0      PLAN:  Increase Levemir to 38 units daily in the evening  Valacyclovir 1000 mg tablet, 1 tablet 3 times a day for 7 days for low back skin rash  Continue other medications  Labs as ordered  Referral to [facility] hand surgeon regarding carpal tunnel.  Central schedulers will call you or  you may contact the  Representative at: [phone].  Continue  working with eye surgery clinic regarding possible future blepharoplasty eyelid surgery for droopiness/ptosis.  If decide to proceed with surgery, then contact clinic to schedule a preop appointment within 30 days of surgery.  If no appointments available, then send me note in MyChart and I will get you worked into the schedule  Continue other medications  Controlled substance agreement signed with use of tramadol      (Chart documentation was completed, in part, with [employer]avo voice-recognition software. Even though reviewed, some grammatical, spelling, and word errors may remain.)    Luigi Driscoll MD  Internal Medicine Department  [facility] is a 78 year old, presenting for the following health issues:  Follow Up     Most recent lab results reviewed with pt.      Component      Latest Ref Rng & Units 3/3/2022 5/27/2022 6/21/2022 9/27/2022   Sodium      133 - 144 mmol/L 140   137   Potassium      3.4 - 5.3 mmol/L 4.7   4.6   Chloride      94 - 109 mmol/L 107   105   Carbon Dioxide      20 - 32 mmol/L 31   26   Anion Gap      3 - 14 mmol/L 2 (L)   6   Urea Nitrogen      7 - 30 mg/dL 11   21   Creatinine      0.52 - 1.04 mg/dL 0.78   0.62   Calcium      8.5 - 10.1 mg/dL 9.5   9.1   Glucose      70 - 99 mg/dL 99   152 (H)   Alkaline Phosphatase      40 - 150 U/L 71      AST      0 - 45 U/L 11      ALT      0 - 50 U/L 21      Protein Total      6.8 - 8.8 g/dL 7.4      Albumin      3.4 - 5.0 g/dL 3.6      Bilirubin Total      0.2 - 1.3 mg/dL 0.4      GFR Estimate      >60 mL/min/1.73m2 79   >90   WBC      4.0 - 11.0 10e3/uL  5.3     RBC Count      3.80 - 5.20 10e6/uL  4.16     Hemoglobin      11.7 - 15.7 g/dL  12.5     Hematocrit      35.0 - 47.0 %  38.8     MCV      78 - 100 fL  93     MCH      26.5 - 33.0 pg  30.0     MCHC      31.5 - 36.5 g/dL  32.2     RDW      10.0 - 15.0 %  12.9     Platelet Count      150 - 450 10e3/uL  298     Cholesterol      <200 mg/dL 139       Triglycerides      <150 mg/dL 119      HDL Cholesterol      >=50 mg/dL 72      LDL Cholesterol Calculated      <=100 mg/dL 43      Non HDL Cholesterol      <130 mg/dL 67      Patient Fasting > 8hrs?       Yes      Creatinine Urine      mg/dL 84      Albumin Urine mg/L      mg/L 7      Albumin Urine mg/g Cr      0.00 - 25.00 mg/g Cr 8.33      Hemoglobin A1C      0.0 - 5.6 % 7.3 (H)  7.9 (H) 7.9 (H)   Vitamin D Deficiency screening      20 - 75 ug/L 43        Component      Latest Ref Rng & Units 12/23/2022   Sodium      133 - 144 mmol/L    Potassium      3.4 - 5.3 mmol/L    Chloride      94 - 109 mmol/L    Carbon Dioxide      20 - 32 mmol/L    Anion Gap      3 - 14 mmol/L    Urea Nitrogen      7 - 30 mg/dL    Creatinine      0.52 - 1.04 mg/dL    Calcium      8.5 - 10.1 mg/dL    Glucose      70 - 99 mg/dL    Alkaline Phosphatase      40 - 150 U/L    AST      0 - 45 U/L    ALT      0 - 50 U/L    Protein Total      6.8 - 8.8 g/dL    Albumin      3.4 - 5.0 g/dL    Bilirubin Total      0.2 - 1.3 mg/dL    GFR Estimate      >60 mL/min/1.73m2    WBC      4.0 - 11.0 10e3/uL    RBC Count      3.80 - 5.20 10e6/uL    Hemoglobin      11.7 - 15.7 g/dL    Hematocrit      35.0 - 47.0 %    MCV      78 - 100 fL    MCH      26.5 - 33.0 pg    MCHC      31.5 - 36.5 g/dL    RDW      10.0 - 15.0 %    Platelet Count      150 - 450 10e3/uL    Cholesterol      <200 mg/dL    Triglycerides      <150 mg/dL    HDL Cholesterol      >=50 mg/dL    LDL Cholesterol Calculated      <=100 mg/dL    Non HDL Cholesterol      <130 mg/dL    Patient Fasting > 8hrs?          Creatinine Urine      mg/dL    Albumin Urine mg/L      mg/L    Albumin Urine mg/g Cr      0.00 - 25.00 mg/g Cr    Hemoglobin A1C      0.0 - 5.6 % 8.3 (H)   Vitamin D Deficiency screening      20 - 75 ug/L      Weight down 4 pounds from last visit  On tramadol for chronic LBP. UDS UTD but needs new CSA. Pain control stable. No constipation.   3x/week at [facility] doing [employer] class and also  "some rowing and stationary bike without chest pain or shortness of breath   Denies CP, SOB, abdominal pain, polyuria, polydipsia, vision changes with central vision,    Has bilateral ptosis and considering possible blepharoplasty for effect on  Superior visual fields. Working with eye  doctor  Getting numbness in  hands R>> L. Rarely in feet bilaterally at night. Not every night.  Has known CTS documented with prior EMG  Has vesicular nontender rash right and left  lumbar area      Sugars AM: 153, 159, 191, 172, 174, 201, 119, 143, 151   Bedtime: 122, 220, 241      Additional ROS:   Constitutional, HEENT, Cardiovascular, Pulmonary, GI and , Neuro, MSK and Psych review of systems/symptoms are otherwise negative or unchanged from previous, except as noted above.      OBJECTIVE:  /72   Pulse 67   Temp 97.6  F (36.4  C) (Temporal)   Wt 89.2 kg (196 lb 9.6 oz)   SpO2 100%   BMI 35.96 kg/m     Estimated body mass index is 35.96 kg/m  as calculated from the following:    Height as of 3/3/22: 1.575 m (5' 2\").    Weight as of this encounter: 89.2 kg (196 lb 9.6 oz).    Eye: Bilateral mild ptosis  Neck: no adenopathy. Thyroid normal to palpation. No bruits  Pulm: Lungs clear to auscultation   CV: Regular rates and rhythm  GI: Soft, obese, nontender, Normal active bowel sounds, No hepatosplenomegaly or masses palpable  Ext: Peripheral pulses intact. No edema.  Neuro: Normal strength and tone, sensory exam mildly reduced median nerve distribution bilateral (right worse than left)  Back: Tenderness to palpation right  paralumbar musculature.  Negative straight leg raise test bilaterally   Skin: Nontender dried vesicular rash bilateral lumbar.  No surrounding erythema            "

## 2023-03-16 NOTE — LETTER
Opioid / Opioid Plus Controlled Substance Agreement    This is an agreement between you and your provider about the safe and appropriate use of controlled substance/opioids prescribed by your care team. Controlled substances are medicines that can cause physical and mental dependence (abuse).    There are strict laws about having and using these medicines. We here at [facility] are committing to working with you in your efforts to get better. To support you in this work, we ll help you schedule regular office appointments for medicine refills. If we must cancel or change your appointment for any reason, we ll make sure you have enough medicine to last until your next appointment.     As a Provider, I will:    Listen carefully to your concerns and treat you with respect.     Recommend a treatment plan that I believe is in your best interest. This plan may involve therapies other than opioid pain medication.     Talk with you often about the possible benefits, and the risk of harm of any medicine that we prescribe for you.     Provide a plan on how to taper (discontinue or go off) using this medicine if the decision is made to stop its use.    As a Patient, I understand that opioid(s):     Are a controlled substance prescribed by my care team to help me function or work and manage my condition(s).     Are strong medicines and can cause serious side effects such as:    Drowsiness, which can seriously affect my driving ability    A lower breathing rate, enough to cause death    Harm to my thinking ability     Depression     Abuse of and addiction to this medicine    Need to be taken exactly as prescribed. Combining opioids with certain medicines or chemicals (such as illegal drugs, sedatives, sleeping pills, and benzodiazepines) can be dangerous or even fatal. If I stop opioids suddenly, I may have severe withdrawal symptoms.    Do not work for all types of pain nor for all patients. If they re not helpful, I may  be asked to stop them.        The risks, benefits and side effects of these medicine(s) were explained to me. I agree that:  1. I will take part in other treatments as advised by my care team. This may be psychiatry or counseling, physical therapy, behavioral therapy, group treatment or a referral to a specialist.     2. I will keep all my appointments. I understand that this is part of the monitoring of opioids. My care team may require an office visit for EVERY opioid/controlled substance refill. If I miss appointments or don t follow instructions, my care team may stop my medicine.    3. I will take my medicines as prescribed. I will not change the dose or schedule unless my care team tells me to. There will be no refills if I run out early.     4. I may be asked to come to the clinic and complete a urine drug test or complete a pill count at any time. If I don t give a urine sample or participate in a pill count, the care team may stop my medicine.    5. I will only receive prescriptions from this clinic for chronic pain. If I am treated by another provider for acute pain issues, I will tell them that I am taking opioid pain medication for chronic pain and that I have a treatment agreement with this provider. I will inform my [facility] care team within one business day if I am given a prescription for any pain medication by another healthcare provider. My [facility] care team can contact other providers and pharmacists about my use of any medicines.    6. It is up to me to make sure that I don t run out of my medicines on weekends or holidays. If my care team is willing to refill my opioid prescription without a visit, I must request refills only during office hours. Refills may take up to 3 business days to process. I will use one pharmacy to fill all my opioid and other controlled substance prescriptions. I will notify the clinic about any changes to my insurance or medication  availability.    7. I am responsible for my prescriptions. If the medicine/prescription is lost, stolen or destroyed, it will not be replaced. I also agree not to share controlled substance medicines with anyone.    8. I am aware I should not use any illegal or recreational drugs. I agree not to drink alcohol unless my care team says I can.       9. If I enroll in the [place] Medical Cannabis program, I will tell my care team prior to my next refill.     10. I will tell my care team right away if I become pregnant, have a new medical problem treated outside of my regular clinic, or have a change in my medications.    11. I understand that this medicine can affect my thinking, judgment and reaction time. Alcohol and drugs affect the brain and body, which can affect the safety of my driving. Being under the influence of alcohol or drugs can affect my decision-making, behaviors, personal safety, and the safety of others. Driving while impaired (DWI) can occur if a person is driving, operating, or in physical control of a car, motorcycle, boat, snowmobile, ATV, motorbike, off-road vehicle, or any other motor vehicle ([place] Statute 169A.20). I understand the risk if I choose to drive or operate any vehicle or machinery.    I understand that if I do not follow any of the conditions above, my prescriptions or treatment may be stopped or changed.          Opioids  What You Need to Know    What are opioids?   Opioids are pain medicines that must be prescribed by a doctor. They are also known as narcotics.     Examples are:   1. morphine (MS Contin, Casi)  2. oxycodone (Oxycontin)  3. oxycodone and acetaminophen (Percocet)  4. hydrocodone and acetaminophen (Vicodin, Norco)   5. fentanyl patch (Duragesic)   6. hydromorphone (Dilaudid)   7. methadone  8. codeine (Tylenol #3)     What do opioids do well?   Opioids are best for severe short-term pain such as after a surgery or injury. They may work well for cancer pain. They may  help some people with long-lasting (chronic) pain.     What do opioids NOT do well?   Opioids never get rid of pain entirely, and they don t work well for most patients with chronic pain. Opioids don t reduce swelling, one of the causes of pain.                                    Other ways to manage chronic pain and improve function include:       Treat the health problem that may be causing pain    Anti-inflammation medicines, which reduce swelling and tenderness, such as ibuprofen (Advil, Motrin) or naproxen (Aleve)    Acetaminophen (Tylenol)    Antidepressants and anti-seizure medicines, especially for nerve pain    Topical treatments such as patches or creams    Injections or nerve blocks    Chiropractic or osteopathic treatment    Acupuncture, massage, deep breathing, meditation, visual imagery, aromatherapy    Use heat or ice at the pain site    Physical therapy     Exercise    Stop smoking    Take part in therapy       Risks and side effects     Talk to your doctor before you start or decide to keep taking opioids. Possible side effects include:      Lowering your breathing rate enough to cause death    Overdose, including death, especially if taking higher than prescribed doses    Worse depression symptoms; less pleasure in things you usually enjoy    Feeling tired or sluggish    Slower thoughts or cloudy thinking    Being more sensitive to pain over time; pain is harder to control    Trouble sleeping or restless sleep    Changes in hormone levels (for example, less testosterone)    Changes in sex drive or ability to have sex    Constipation    Unsafe driving    Itching and sweating    Dizziness    Nausea, throwing up and dry mouth    What else should I know about opioids?    Opioids may lead to dependence, tolerance, or addiction.      Dependence means that if you stop or reduce the medicine too quickly, you will have withdrawal symptoms. These include loose poop (diarrhea), jitters, flu-like symptoms,  nervousness and tremors. Dependence is not the same as addiction.                       Tolerance means needing higher doses over time to get the same effect. This may increase the chance of serious side effects.      Addiction is when people improperly use a substance that harms their body, their mind or their relations with others. Use of opiates can cause a relapse of addiction if you have a history of drug or alcohol abuse.      People who have used opioids for a long time may have a lower quality of life, worse depression, higher levels of pain and more visits to doctors.    You can overdose on opioids. Take these steps to lower your risk of overdose:    1. Recognize the signs:  Signs of overdose include decrease or loss of consciousness (blackout), slowed breathing, trouble waking up and blue lips. If someone is worried about overdose, they should call 911.    2. Talk to your doctor about Narcan (naloxone).   If you are at risk for overdose, you may be given a prescription for Narcan. This medicine very quickly reverses the effects of opioids.   If you overdose, a friend or family member can give you Narcan while waiting for the ambulance. They need to know the signs of overdose and how to give Narcan.     3. Don't use alcohol or street drugs.   Taking them with opioids can cause death.    4. Do not take any of these medicines unless your doctor says it s OK. Taking these with opioids can cause death:    Benzodiazepines, such as lorazepam (Ativan), alprazolam (Xanax) or diazepam (Valium)    Muscle relaxers, such as cyclobenzaprine (Flexeril)    Sleeping pills like zolpidem (Ambien)     Other opioids      How to keep you and other people safe while taking opioids:    1. Never share your opioids with others.  Opioid medicines are regulated by the Drug Enforcement Agency (MARLEE). Selling or sharing medications is a criminal act.    2. Be sure to store opioids in a secure place, locked up if possible. Young children  can easily swallow them and overdose.    3. When you are traveling with your medicines, keep them in the original bottles. If you use a pill box, be sure you also carry a copy of your medicine list from your clinic or pharmacy.    4. Safe disposal of opioids    Most pharmacies have places to get rid of medicine, called disposal kiosks. Medicine disposal options are also available in every [facility]. Search your county and  medication disposal  to find more options. You can find more details at:  [email or web address]     I agree that my provider, clinic care team, and pharmacy may work with any city, state or federal law enforcement agency that investigates the misuse, sale, or other diversion of my controlled medicine. I will allow my provider to discuss my care with, or share a copy of, this agreement with any other treating provider, pharmacy or emergency room where I receive care.    I have read this agreement and have asked questions about anything I did not understand.    _______________________________________________________  Patient Signature - Essie Huddleston _____________________                   Date     _______________________________________________________  Provider Signature - Luigi Driscoll MD   _____________________                   Date     _______________________________________________________  Witness Signature (required if provider not present while patient signing)   _____________________                   Date

## 2023-03-23 DIAGNOSIS — E11.21 TYPE 2 DIABETES MELLITUS WITH DIABETIC NEPHROPATHY, WITH LONG-TERM CURRENT USE OF INSULIN (H): ICD-10-CM

## 2023-03-23 DIAGNOSIS — M54.41 CHRONIC RIGHT-SIDED LOW BACK PAIN WITH RIGHT-SIDED SCIATICA: ICD-10-CM

## 2023-03-23 DIAGNOSIS — Z79.4 TYPE 2 DIABETES MELLITUS WITH DIABETIC NEPHROPATHY, WITH LONG-TERM CURRENT USE OF INSULIN (H): ICD-10-CM

## 2023-03-23 DIAGNOSIS — G89.29 CHRONIC RIGHT-SIDED LOW BACK PAIN WITH RIGHT-SIDED SCIATICA: ICD-10-CM

## 2023-03-23 DIAGNOSIS — E78.5 HYPERLIPIDEMIA LDL GOAL <100: ICD-10-CM

## 2023-03-23 NOTE — TELEPHONE ENCOUNTER
Pt also needs gabapentin (NEURONTIN) 300 MG capsule and they are leaving out of town tonight. Pt's daughter just called in and stated this. Please advise          Thank you

## 2023-03-24 RX ORDER — SIMVASTATIN 20 MG
TABLET ORAL
Qty: 90 TABLET | Refills: 3 | Status: SHIPPED | OUTPATIENT
Start: 2023-03-24 | End: 2024-01-14

## 2023-03-24 RX ORDER — GABAPENTIN 300 MG/1
300 CAPSULE ORAL 3 TIMES DAILY
Qty: 270 CAPSULE | Refills: 3 | Status: SHIPPED | OUTPATIENT
Start: 2023-03-24 | End: 2024-04-09

## 2023-03-24 RX ORDER — CELECOXIB 100 MG/1
CAPSULE ORAL
Qty: 180 CAPSULE | Refills: 1 | Status: SHIPPED | OUTPATIENT
Start: 2023-03-24 | End: 2023-10-01

## 2023-03-24 NOTE — TELEPHONE ENCOUNTER
Received call from daughter. Will be leaving out of state in the next hour would like refills sent to pharmacy so they can  on their way out.

## 2023-03-31 DIAGNOSIS — R09.81 NASAL CONGESTION: ICD-10-CM

## 2023-03-31 RX ORDER — FLUTICASONE PROPIONATE 50 MCG
SPRAY, SUSPENSION (ML) NASAL
Qty: 48 G | Refills: 3 | Status: SHIPPED | OUTPATIENT
Start: 2023-03-31

## 2023-03-31 NOTE — TELEPHONE ENCOUNTER
Prescription approved per [facility] Refill Protocol.  Elsie Baxter, RN  [facility] Triage Nurse

## 2023-04-07 DIAGNOSIS — Z79.4 TYPE 2 DIABETES MELLITUS WITH DIABETIC NEPHROPATHY, WITH LONG-TERM CURRENT USE OF INSULIN (H): ICD-10-CM

## 2023-04-07 DIAGNOSIS — E11.21 TYPE 2 DIABETES MELLITUS WITH DIABETIC NEPHROPATHY, WITH LONG-TERM CURRENT USE OF INSULIN (H): ICD-10-CM

## 2023-04-07 RX ORDER — PEN NEEDLE, DIABETIC 32GX 5/32"
NEEDLE, DISPOSABLE MISCELLANEOUS
Qty: 100 EACH | Refills: 1 | Status: SHIPPED | OUTPATIENT
Start: 2023-04-07 | End: 2023-04-12 | Stop reason: DRUGHIGH

## 2023-04-07 NOTE — TELEPHONE ENCOUNTER
Prescription approved per [facility] Refill Protocol.  Elsie Baxter, RN  [facility] Triage Nurse

## 2023-04-10 ENCOUNTER — TRANSFERRED RECORDS (OUTPATIENT)
Dept: HEALTH INFORMATION MANAGEMENT | Facility: CLINIC | Age: 78
End: 2023-04-10
Payer: COMMERCIAL

## 2023-04-11 ENCOUNTER — TELEPHONE (OUTPATIENT)
Dept: INTERNAL MEDICINE | Facility: CLINIC | Age: 78
End: 2023-04-11
Payer: COMMERCIAL

## 2023-04-11 DIAGNOSIS — Z79.4 TYPE 2 DIABETES MELLITUS WITH DIABETIC NEPHROPATHY, WITH LONG-TERM CURRENT USE OF INSULIN (H): Primary | ICD-10-CM

## 2023-04-11 DIAGNOSIS — E11.21 TYPE 2 DIABETES MELLITUS WITH DIABETIC NEPHROPATHY, WITH LONG-TERM CURRENT USE OF INSULIN (H): Primary | ICD-10-CM

## 2023-04-12 PROBLEM — H02.403 PTOSIS OF BOTH EYELIDS: Status: ACTIVE | Noted: 2023-04-12

## 2023-04-12 NOTE — TELEPHONE ENCOUNTER
Rx for 6 mm insulin pen needles sent to pharmacy 4/7/2023, pharmacy wondering if okay to dispense 4 mm instead? Please advise.     Routing to provider for review.     Nuno Chandler RN

## 2023-04-12 NOTE — TELEPHONE ENCOUNTER
Rx changed to 31g x 4mm insulin pen needles as requested. Pt has [facility] but not using very often. Therefore please call pt to maker her aware Rx changed to 4mm needles as requested and then close encounter

## 2023-04-24 ENCOUNTER — TRANSFERRED RECORDS (OUTPATIENT)
Dept: HEALTH INFORMATION MANAGEMENT | Facility: CLINIC | Age: 78
End: 2023-04-24
Payer: COMMERCIAL

## 2023-04-28 ENCOUNTER — OFFICE VISIT (OUTPATIENT)
Dept: INTERNAL MEDICINE | Facility: CLINIC | Age: 78
End: 2023-04-28
Payer: COMMERCIAL

## 2023-04-28 VITALS
DIASTOLIC BLOOD PRESSURE: 78 MMHG | HEART RATE: 67 BPM | BODY MASS INDEX: 36.01 KG/M2 | WEIGHT: 195.7 LBS | TEMPERATURE: 97 F | SYSTOLIC BLOOD PRESSURE: 128 MMHG | OXYGEN SATURATION: 98 % | HEIGHT: 62 IN

## 2023-04-28 DIAGNOSIS — R80.9 PROTEINURIA, UNSPECIFIED TYPE: ICD-10-CM

## 2023-04-28 DIAGNOSIS — H02.403 PTOSIS OF BOTH EYELIDS: ICD-10-CM

## 2023-04-28 DIAGNOSIS — I10 ESSENTIAL HYPERTENSION WITH GOAL BLOOD PRESSURE LESS THAN 130/80: ICD-10-CM

## 2023-04-28 DIAGNOSIS — G47.33 OSA (OBSTRUCTIVE SLEEP APNEA): ICD-10-CM

## 2023-04-28 DIAGNOSIS — E11.21 TYPE 2 DIABETES MELLITUS WITH DIABETIC NEPHROPATHY, WITH LONG-TERM CURRENT USE OF INSULIN (H): ICD-10-CM

## 2023-04-28 DIAGNOSIS — E66.01 SEVERE OBESITY WITH BODY MASS INDEX (BMI) OF 35.0 TO 39.9 WITH COMORBIDITY (H): ICD-10-CM

## 2023-04-28 DIAGNOSIS — Z79.4 TYPE 2 DIABETES MELLITUS WITH DIABETIC NEPHROPATHY, WITH LONG-TERM CURRENT USE OF INSULIN (H): ICD-10-CM

## 2023-04-28 DIAGNOSIS — Z01.818 PREOPERATIVE EXAMINATION: ICD-10-CM

## 2023-04-28 DIAGNOSIS — F11.90 CHRONIC, CONTINUOUS USE OF OPIOIDS: ICD-10-CM

## 2023-04-28 PROCEDURE — 99214 OFFICE O/P EST MOD 30 MIN: CPT | Performed by: INTERNAL MEDICINE

## 2023-04-28 ASSESSMENT — ANXIETY QUESTIONNAIRES
4. TROUBLE RELAXING: SEVERAL DAYS
6. BECOMING EASILY ANNOYED OR IRRITABLE: SEVERAL DAYS
1. FEELING NERVOUS, ANXIOUS, OR ON EDGE: SEVERAL DAYS
5. BEING SO RESTLESS THAT IT IS HARD TO SIT STILL: SEVERAL DAYS
GAD7 TOTAL SCORE: 5
7. FEELING AFRAID AS IF SOMETHING AWFUL MIGHT HAPPEN: NOT AT ALL
3. WORRYING TOO MUCH ABOUT DIFFERENT THINGS: SEVERAL DAYS
8. IF YOU CHECKED OFF ANY PROBLEMS, HOW DIFFICULT HAVE THESE MADE IT FOR YOU TO DO YOUR WORK, TAKE CARE OF THINGS AT HOME, OR GET ALONG WITH OTHER PEOPLE?: SOMEWHAT DIFFICULT
7. FEELING AFRAID AS IF SOMETHING AWFUL MIGHT HAPPEN: NOT AT ALL
IF YOU CHECKED OFF ANY PROBLEMS ON THIS QUESTIONNAIRE, HOW DIFFICULT HAVE THESE PROBLEMS MADE IT FOR YOU TO DO YOUR WORK, TAKE CARE OF THINGS AT HOME, OR GET ALONG WITH OTHER PEOPLE: SOMEWHAT DIFFICULT
GAD7 TOTAL SCORE: 5
2. NOT BEING ABLE TO STOP OR CONTROL WORRYING: NOT AT ALL
GAD7 TOTAL SCORE: 5

## 2023-04-28 ASSESSMENT — PATIENT HEALTH QUESTIONNAIRE - PHQ9
SUM OF ALL RESPONSES TO PHQ QUESTIONS 1-9: 5
10. IF YOU CHECKED OFF ANY PROBLEMS, HOW DIFFICULT HAVE THESE PROBLEMS MADE IT FOR YOU TO DO YOUR WORK, TAKE CARE OF THINGS AT HOME, OR GET ALONG WITH OTHER PEOPLE: SOMEWHAT DIFFICULT
SUM OF ALL RESPONSES TO PHQ QUESTIONS 1-9: 5

## 2023-04-28 NOTE — PATIENT INSTRUCTIONS
Approval given to proceed with proposed procedure, without further diagnostic evaluation..  No solid food after midnight prior to your  procedure. May have clear liquids up to 2 hours prior to the  procedure (9:45am)   Stop  Aspirin 7  days and Celecoxib/Celebrex 3 days prior to your  procedure to reduce risk of bleeding.   May use Tylenol for pain control  between then and your procedure  Take Levemir  24 units the night before surgery since you will not be eating the AM of surgery. Resume usual  38 unit  after surgery that evening  Take  Losartan, Bisoprolol and Gabapentin   when you first get up the  AM of the  procedure    Hold other prescription and over the counter medications/supplements the day of the  procedure. May resume usual medications/supplements after the procedure unless instructed otherwise by your surgeon   Call  [phone] or use [employer] to schedule a future lab appointment  non-fasting in 3 months.

## 2023-04-28 NOTE — PROGRESS NOTES
[address] [address]  Phone: [phone]  Primary Provider: Fredy Wright  Pre-op Performing Provider: FREDY WRIGHT      PREOPERATIVE EVALUATION:  Today's date: 4/28/2023    Essie Huddleston is a 78 year old female who presents for a preoperative evaluation.    Surgical Information:  Surgery/Procedure:  Bilateral blepharoplasty  Surgery Location: [facility]  Surgeon: Dr. Juma Aden  Surgery Date: 5/16/2023  Time of Surgery: 11:45am  Where patient plans to recover: At home with family  Fax number for surgical facility: [phone]       Assessment & Plan     The proposed surgical procedure is considered LOW risk.    Preoperative examination  Appears medically stable to proceed with surgery as scheduled    Ptosis of both eyelids  Affecting superior visual eye feels    Type 2 diabetes mellitus with diabetic nephropathy, with long-term current use of insulin (H)  A1c minimally above goal for age at 8.1, improved from previous.  Weight down and patient has been improving diet.  Recheck A1c 3 months with same medication  - Hemoglobin A1c; Future    Proteinuria, unspecified type  Minimal proteinuria.  Blood pressure at goal on maximum dose ARB.  Continue current medication management and recheck 3 months  - Albumin Random Urine Quantitative with Creat Ratio; Future    Severe obesity with body mass index (BMI) of 35.0 to 39.9 with comorbidity (H)  Contributing to diabetes, hypertension, sleep apnea.  Weight down 5 pounds.  Continue improved diet and exercise    Essential hypertension with goal blood pressure less than 130/80  Controlled    Chronic, continuous use of opioids  Chronic low back pain, stable with tramadol.  Urine drug screen and controlled substance agreement [place]. [place]    KIM (obstructive sleep apnea)  Intolerant to CPAP.  Managed with dental device though has not been using recently due to some  molar tooth issues being addressed by dentist.  Monitor patient closely in the perioperative time period for signs of apnea/desaturation           Risks and Recommendations:  The patient has the following additional risks and recommendations for perioperative complications:   - No identified additional risk factors other than previously addressed    Patient instructions:    Approval given to proceed with proposed procedure, without further diagnostic evaluation..  No solid food after midnight prior to your  procedure. May have clear liquids up to 2 hours prior to the  procedure (9:45am)   Stop  Aspirin 7  days and Celecoxib/Celebrex 3 days prior to your  procedure to reduce risk of bleeding.   May use Tylenol for pain control  between then and your procedure  Take Levemir  24 units the night before surgery since you will not be eating the AM of surgery. Resume usual  38 unit  after surgery that evening  Take  Losartan, Bisoprolol and Gabapentin   when you first get up the  AM of the  procedure    Hold other prescription and over the counter medications/supplements the day of the  procedure. May resume usual medications/supplements after the procedure unless instructed otherwise by your surgeon   Call  [phone] or use [facility] to schedule a future lab appointment  non-fasting in 3 months.       RECOMMENDATION:  APPROVAL GIVEN to proceed with proposed procedure, without further diagnostic evaluation.           Subjective     HPI related to upcoming procedure:     Hx bilateral ptosis affecting superior eye visual fields. Presents for clearance to undergo surgical correction as above.   Regarding exercise tolerance,  Walking  5-10 min to bus stop and back   Daily without chest pain or shortness of breath           4/28/2023     8:58 AM   Preop Questions   1. Have you ever had a heart attack or stroke? No   2. Have you ever had surgery on your heart or blood vessels, such as a stent placement, a coronary artery  bypass, or surgery on an artery in your head, neck, heart, or legs? No   3. Do you have chest pain with activity? No   4. Do you have a history of  heart failure? No   5. Do you currently have a cold, bronchitis or symptoms of other infection? No   6. Do you have a cough, shortness of breath, or wheezing? No   7. Do you or anyone in your family have previous history of blood clots? No   8. Do you or does anyone in your family have a serious bleeding problem such as prolonged bleeding following surgeries or cuts? No   9. Have you ever had problems with anemia or been told to take iron pills? No   10. Have you had any abnormal blood loss such as black, tarry or bloody stools, or abnormal vaginal bleeding? No   11. Have you ever had a blood transfusion? No   12. Are you willing to have a blood transfusion if it is medically needed before, during, or after your surgery? Yes   13. Have you or any of your relatives ever had problems with anesthesia? UNKNOWN - No previous surgery   14. Do you have sleep apnea, excessive snoring or daytime drowsiness? YES -  Has KIM. Previously had dental device but not using now with  Some molar teeth lost and await dental bridge to be completed so can use again in the future. Not using CPAP   14a. Do you have a CPAP machine? No   15. Do you have any artifical heart valves or other implanted medical devices like a pacemaker, defibrillator, or continuous glucose monitor? No   16. Do you have artificial joints? No   17. Are you allergic to latex? No       Health Care Directive:  Patient does not have a Health Care Directive or Living Will: Discussed advance care planning with patient; information given to patient to review.    Preoperative Review of :   reviewed - controlled substances reflected in medication list.      Status of Chronic Conditions:  DIABETES - Patient has a longstanding history of DiabetesType Type II . Patient is being treated with insulin injections and denies  significant side effects. Control has been good overall for age though mildly above goal recently. Complicating factors include but are not limited to: hypertension, hyperlipidemia and obesity.     HYPERLIPIDEMIA - Patient has a long history of significant Hyperlipidemia requiring medication for treatment with recent good control. Patient reports no problems or side effects with the medication.     HYPERTENSION - Patient has longstanding history of HTN , currently denies any symptoms referable to elevated blood pressure. Specifically denies chest pain, palpitations, dyspnea, orthopnea, PND or peripheral edema. Blood pressure readings have been in normal range. Current medication regimen is as listed below. Patient denies any side effects of medication.      KIM - Usually using dental device but not recently. See above    CHRONIC LOW BACK PAIN -controlled with Celebrex, tramadol    BILATERAL CARPAL TUNNEL SYNDROME -controlled with wrist braces.  Patient declines surgical management at this time    Review of Systems  CONSTITUTIONAL: NEGATIVE for fever, chills. Weight  Down 5 pounds since December  INTEGUMENTARY/SKIN: NEGATIVE for worrisome rashes, moles or lesions  EYES: See HPI  ENT/MOUTH: NEGATIVE for ear, mouth and throat problems  RESP: NEGATIVE for significant cough or SOB  CV: NEGATIVE for chest pain, palpitations or peripheral edema  GI: NEGATIVE for nausea, abdominal pain, heartburn, or change in bowel habits  : NEGATIVE for frequency, dysuria, or hematuria  MUSCULOSKELETAL:  POSITIVE for stable chronic LBP  NEURO: NEGATIVE for weakness, dizziness. Has bilateral CTS. Using wrist brace. Occ tingling in BLEs (R>L)   ENDOCRINE: NEGATIVE for temperature intolerance. POSITIVE  For DM. A1C 8.1  HEME: NEGATIVE for bleeding problems  PSYCHIATRIC: NEGATIVE for changes in mood or affect      Patient Active Problem List    Diagnosis Date Noted     KIM (obstructive sleep apnea) 04/28/2023     Priority: Medium     Ptosis  of both eyelids 04/12/2023     Priority: Medium     Bilateral carpal tunnel syndrome 06/09/2021     Priority: Medium     Chronic, continuous use of opioids 06/09/2021     Priority: Medium     Severe obesity with body mass index (BMI) of 35.0 to 39.9 with comorbidity (H) 06/09/2021     Priority: Medium     Herpes simplex virus infection 11/01/2020     Priority: Medium     Chronic right shoulder pain 11/01/2020     Priority: Medium     Nonproliferative diabetic retinopathy of both eyes without macular edema (H) 08/26/2020     Priority: Medium     Chronic right-sided low back pain without sciatica 07/17/2020     Priority: Medium     Patient is followed by Luigi Driscoll MD for ongoing prescription of pain medication.  All refills should only be approved by this provider, or covering partner.    Medication(s): Tramadol 50mg BID.   Maximum quantity per month: 60  Clinic visit frequency required: Q 3 months      Controlled substance agreement:  Encounter-Level CSA:    There are no encounter-level csa.     Patient-Level CSA:    Controlled Substance Agreement - Opioid - Scan on 1/27/2020 11:24 AM       Pain Clinic evaluation in the past: No       Last [place]:  done on 11/22/20   [email or web address]           Essential hypertension with goal blood pressure less than 130/80 12/29/2019     Priority: Medium     Type 2 diabetes mellitus with diabetic nephropathy, with long-term current use of insulin (H) 05/08/2018     Priority: Medium     Advanced directives, counseling/discussion 07/23/2012     Priority: Medium     Discussed advance care planning with patient; however, patient declined at this time. 7/23/2012          Hyperlipidemia LDL goal <100 06/20/2012     Priority: Medium     Vitamin D deficiency 06/20/2012     Priority: Medium      Past Medical History:   Diagnosis Date     Bilateral carpal tunnel syndrome 04/2021    Per [facility]     Chronic right-sided low back pain with right-sided  sciatica 12/29/2019     Chronic, continuous use of opioids 6/9/2021     Essential hypertension with goal blood pressure less than 130/80 12/29/2019     Hyperlipidemia LDL goal <100 06/20/2012     KIM (obstructive sleep apnea) 4/28/2023     Type 2 diabetes mellitus with diabetic nephropathy  (goal A1C<7) 10/24/2015     Vitamin D deficiency 06/20/2012     No past surgical history on file.  Current Outpatient Medications   Medication Sig Dispense Refill     amLODIPine (NORVASC) 5 MG tablet Take 1 tablet by mouth once daily 90 tablet 0     aspirin 81 MG tablet Take 1 tablet (81 mg) by mouth daily 10 tablet 0     blood glucose (NO BRAND SPECIFIED) test strip Use to test blood sugar 2 times daily or as directed. 200 strip 3     blood glucose (NO BRAND SPECIFIED) test strip Use to test blood sugar 3 times daily or as directed. 300 strip 3     blood glucose calibration (NO BRAND SPECIFIED) solution Use to calibrate blood glucose monitor as needed as directed. 1 Bottle 11     blood glucose monitoring (NO BRAND SPECIFIED) meter device kit Use to test blood sugar 3 times daily or as directed. 1 kit 0     blood glucose monitoring (NO BRAND SPECIFIED) meter device kit Use to test blood sugar 2 times daily or as directed. 1 kit 0     celecoxib (CELEBREX) 100 MG capsule Take 1 capsule by mouth twice daily 180 capsule 1     chlorhexidine (PERIDEX) 0.12 % solution        fluticasone (FLONASE) 50 MCG/ACT nasal spray USE 2 SPRAY(S) IN EACH NOSTRIL AT BEDTIME 48 g 3     gabapentin (NEURONTIN) 300 MG capsule Take 1 capsule (300 mg) by mouth 3 times daily 270 capsule 3     glipiZIDE (GLUCOTROL) 5 MG tablet TAKE 1 TABLET BY MOUTH TWICE DAILY BEFORE MEAL(S) 180 tablet 3     insulin detemir (LEVEMIR FLEXPEN/FLEXTOUCH) 100 UNIT/ML pen INJECT 38 UNITS SUBCUTANEOUSLY IN THE EVENING 45 mL 0     Insulin Pen Needle 31G X 4 MM MISC 1 Needle daily to use with insulin injection 100 each 3     losartan (COZAAR) 100 MG tablet Take 1 tablet by mouth  "once daily 90 tablet 0     metFORMIN (GLUCOPHAGE) 1000 MG tablet Take 1 tablet (1,000 mg) by mouth 2 times daily (with meals) 180 tablet 3     simvastatin (ZOCOR) 20 MG tablet TAKE 1 TABLET BY MOUTH AT BEDTIME 90 tablet 3     thin (NO BRAND SPECIFIED) lancets Use to test blood sugar 2 times daily or as directed. 200 each 1     traMADol (ULTRAM) 50 MG tablet Take 1 tablet by mouth twice daily 60 tablet 4     bisoprolol (ZEBETA) 5 MG tablet TAKE 1/2 TO 1 (ONE-HALF TO ONE) TABLET BY MOUTH ONCE DAILY 90 tablet 0       Allergies   Allergen Reactions     Metoprolol      cough        Social History     Tobacco Use     Smoking status: Never     Smokeless tobacco: Never   Vaping Use     Vaping status: Not on file   Substance Use Topics     Alcohol use: Yes     Comment: seldom 1 glass wine.     Family History   Problem Relation Age of Onset     Diabetes Mother      Cancer Father         prostate     Asthma Son      History   Drug Use No         Objective     /78   Pulse 67   Temp 97  F (36.1  C) (Temporal)   Ht 1.575 m (5' 2\")   Wt 88.8 kg (195 lb 11.2 oz)   SpO2 98%   BMI 35.79 kg/m      Physical Exam  Eye: PERRL, EOMI. Bilateral eyelid ptosis  HENT: ear canals and TM's normal and nose and mouth without ulcers or lesions   Neck: no adenopathy. Thyroid normal to palpation. No bruits  Pulm: Lungs clear to auscultation   CV: Regular rates and rhythm  GI: Soft, obese, nontender, Normal active bowel sounds, No hepatosplenomegaly or masses palpable  Ext: Peripheral pulses intact. No edema.  Neuro: Normal strength and tone, sensory exam grossly normal except for mildly reduced light touch sensation median nerve distribution bilateral hands  Back: Tenderness to palpation bilateral paralumbar musculature.  Negative straight leg raise test bilaterally       Recent Labs   Lab Test 03/16/23  1135 12/23/22  1201 09/27/22  1339 06/21/22  0837 05/27/22  0846   HGB  --   --   --   --  12.5   PLT  --   --   --   --  298     " --  137  --   --    POTASSIUM 4.6  --  4.6  --   --    CR 0.81  --  0.62  --   --    A1C 8.1* 8.3* 7.9*   < >  --     < > = values in this interval not displayed.        Diagnostics:  No labs were ordered during this visit.   No EKG required for low risk surgery     Revised Cardiac Risk Index (RCRI):  The patient has the following serious cardiovascular risks for perioperative complications:   - Diabetes Mellitus (on Insulin) = 1 point     RCRI Interpretation: 1 point: Class II (low risk - 0.9% complication rate)           Signed Electronically by: Luigi Driscoll MD  Copy of this evaluation report is provided to requesting physician.

## 2023-05-10 ENCOUNTER — TELEPHONE (OUTPATIENT)
Dept: INTERNAL MEDICINE | Facility: CLINIC | Age: 78
End: 2023-05-10
Payer: COMMERCIAL

## 2023-05-10 NOTE — TELEPHONE ENCOUNTER
Reason for Call:  Form, our goal is to have forms completed with 72 hours, however, some forms may require a visit or additional information.    Type of letter, form or note:  medical    Who is the form from?: Patient    Where did the form come from: Patient or family brought in       What clinic location was the form placed at?: Internal Medicine    Where the form was placed: Given to MA/RN    What number is listed as a contact on the form?: [phone]       Additional comments: needs done by friday    Call taken on 5/10/2023 at 12:55 PM by Mireille Juan MA

## 2023-05-12 ENCOUNTER — TELEPHONE (OUTPATIENT)
Dept: INTERNAL MEDICINE | Facility: CLINIC | Age: 78
End: 2023-05-12
Payer: COMMERCIAL

## 2023-05-12 NOTE — TELEPHONE ENCOUNTER
Tried to call patient.  No answer.  Form that patient dropped off was a preop form from  [facility].  Since preop is done through Epic chart, additional form was not needed.  Preop was previously faxed to the surgery center on 5/1/2023 by clinic staff and, just in case they had not received it for some reason, a copy of the preop from patient's chart was faxed again today.  Left message that preop has been sent and additional paper forms do not need to be completed

## 2023-05-12 NOTE — TELEPHONE ENCOUNTER
Reason for Call:  Form, our goal is to have forms completed with 72 hours, however, some forms may require a visit or additional information.    Type of letter, form or note:  medical    Who is the form from?: Patient    Where did the form come from: Patient or family brought in       What clinic location was the form placed at? Internal Medicine    Where the form was placed  Sent to scanning     What number is listed as a contact on the form?     [phone]  Additional comments  Patient brought in health care directive form for provider was sent to scanning  5/12/2023    Call taken on 5/12/2023 at 4:21 PM by Mae Ozuna

## 2023-05-12 NOTE — TELEPHONE ENCOUNTER
Called in to check on status of form, going to come to clinic to check on status in person.    Shanti GREENE    [facility]

## 2023-05-13 ENCOUNTER — OFFICE VISIT (OUTPATIENT)
Dept: URGENT CARE | Facility: URGENT CARE | Age: 78
End: 2023-05-13
Payer: COMMERCIAL

## 2023-05-13 VITALS
DIASTOLIC BLOOD PRESSURE: 83 MMHG | OXYGEN SATURATION: 99 % | BODY MASS INDEX: 35.56 KG/M2 | HEART RATE: 84 BPM | SYSTOLIC BLOOD PRESSURE: 145 MMHG | TEMPERATURE: 97.8 F | WEIGHT: 194.4 LBS

## 2023-05-13 DIAGNOSIS — R19.7 DIARRHEA, UNSPECIFIED TYPE: Primary | ICD-10-CM

## 2023-05-13 LAB
ANION GAP SERPL CALCULATED.3IONS-SCNC: 7 MMOL/L (ref 3–14)
BASOPHILS # BLD AUTO: 0 10E3/UL (ref 0–0.2)
BASOPHILS NFR BLD AUTO: 1 %
BUN SERPL-MCNC: 10 MG/DL (ref 7–30)
CALCIUM SERPL-MCNC: 8.7 MG/DL (ref 8.5–10.1)
CHLORIDE BLD-SCNC: 103 MMOL/L (ref 94–109)
CO2 SERPL-SCNC: 30 MMOL/L (ref 20–32)
CREAT SERPL-MCNC: 0.7 MG/DL (ref 0.52–1.04)
EOSINOPHIL # BLD AUTO: 0.2 10E3/UL (ref 0–0.7)
EOSINOPHIL NFR BLD AUTO: 5 %
ERYTHROCYTE [DISTWIDTH] IN BLOOD BY AUTOMATED COUNT: 13.3 % (ref 10–15)
GFR SERPL CREATININE-BSD FRML MDRD: 89 ML/MIN/1.73M2
GLUCOSE BLD-MCNC: 222 MG/DL (ref 70–99)
HCT VFR BLD AUTO: 38 % (ref 35–47)
HGB BLD-MCNC: 12 G/DL (ref 11.7–15.7)
IMM GRANULOCYTES # BLD: 0 10E3/UL
IMM GRANULOCYTES NFR BLD: 0 %
LYMPHOCYTES # BLD AUTO: 2 10E3/UL (ref 0.8–5.3)
LYMPHOCYTES NFR BLD AUTO: 51 %
MCH RBC QN AUTO: 29.8 PG (ref 26.5–33)
MCHC RBC AUTO-ENTMCNC: 31.6 G/DL (ref 31.5–36.5)
MCV RBC AUTO: 94 FL (ref 78–100)
MONOCYTES # BLD AUTO: 0.3 10E3/UL (ref 0–1.3)
MONOCYTES NFR BLD AUTO: 9 %
NEUTROPHILS # BLD AUTO: 1.3 10E3/UL (ref 1.6–8.3)
NEUTROPHILS NFR BLD AUTO: 34 %
NRBC # BLD AUTO: 0 10E3/UL
NRBC BLD AUTO-RTO: 0 /100
PLATELET # BLD AUTO: 245 10E3/UL (ref 150–450)
POTASSIUM BLD-SCNC: 4.4 MMOL/L (ref 3.4–5.3)
RBC # BLD AUTO: 4.03 10E6/UL (ref 3.8–5.2)
SODIUM SERPL-SCNC: 140 MMOL/L (ref 133–144)
WBC # BLD AUTO: 3.9 10E3/UL (ref 4–11)

## 2023-05-13 PROCEDURE — 85025 COMPLETE CBC W/AUTO DIFF WBC: CPT | Performed by: FAMILY MEDICINE

## 2023-05-13 PROCEDURE — 99214 OFFICE O/P EST MOD 30 MIN: CPT | Performed by: FAMILY MEDICINE

## 2023-05-13 PROCEDURE — 87506 IADNA-DNA/RNA PROBE TQ 6-11: CPT | Performed by: FAMILY MEDICINE

## 2023-05-13 PROCEDURE — 36415 COLL VENOUS BLD VENIPUNCTURE: CPT | Performed by: FAMILY MEDICINE

## 2023-05-13 PROCEDURE — 80048 BASIC METABOLIC PNL TOTAL CA: CPT | Performed by: FAMILY MEDICINE

## 2023-05-13 NOTE — PROGRESS NOTES
SUBJECTIVE: Essie Huddleston is a 78 year old female presenting with a chief complaint of diarrhea.  Onset of symptoms was 1 day(s) ago.  No abd pain, no n/v    Past Medical History:   Diagnosis Date     Bilateral carpal tunnel syndrome 04/2021    Per [facility]     Chronic right-sided low back pain with right-sided sciatica 12/29/2019     Chronic, continuous use of opioids 6/9/2021     Essential hypertension with goal blood pressure less than 130/80 12/29/2019     Hyperlipidemia LDL goal <100 06/20/2012     KIM (obstructive sleep apnea) 4/28/2023     Type 2 diabetes mellitus with diabetic nephropathy  (goal A1C<7) 10/24/2015     Vitamin D deficiency 06/20/2012     Allergies   Allergen Reactions     Metoprolol      cough     Social History     Tobacco Use     Smoking status: Never     Smokeless tobacco: Never   Vaping Use     Vaping status: Not on file   Substance Use Topics     Alcohol use: Yes     Comment: seldom 1 glass wine.       ROS:  SKIN: no rash  GI: no vomiting    OBJECTIVE:  BP (!) 145/83 (BP Location: Right arm, Patient Position: Sitting, Cuff Size: Adult Large)   Pulse 84   Temp 97.8  F (36.6  C) (Oral)   Wt 88.2 kg (194 lb 6.4 oz)   SpO2 99%   BMI 35.56 kg/m     GENERAL APPEARANCE: healthy, alert and no distress  ABDOMEN: hyperactive bowel sounds, soft non tender, no guarding/rigidity/rebound  SKIN: no suspicious lesions or rashes      ICD-10-CM    1. Diarrhea, unspecified type  R19.7 Basic metabolic panel     CBC with Platelets & Differential     Enteric Bacteria and Virus Panel by [facility]          Fluids/Rest, f/u if worse/not any better

## 2023-05-15 LAB
C COLI+JEJUNI+LARI FUSA STL QL NAA+PROBE: NOT DETECTED
EC STX1 GENE STL QL NAA+PROBE: NOT DETECTED
EC STX2 GENE STL QL NAA+PROBE: NOT DETECTED
NOROV GI+II ORF1-ORF2 JNC STL QL NAA+PR: DETECTED
RVA NSP5 STL QL NAA+PROBE: NOT DETECTED
SALMONELLA SP RPOD STL QL NAA+PROBE: NOT DETECTED
SHIGELLA SP+EIEC IPAH STL QL NAA+PROBE: NOT DETECTED
V CHOL+PARA RFBL+TRKH+TNAA STL QL NAA+PR: NOT DETECTED
Y ENTERO RECN STL QL NAA+PROBE: NOT DETECTED

## 2023-05-16 ENCOUNTER — TRANSFERRED RECORDS (OUTPATIENT)
Dept: MULTI SPECIALTY CLINIC | Facility: CLINIC | Age: 78
End: 2023-05-16

## 2023-05-16 LAB — RETINOPATHY: NORMAL

## 2023-05-26 ENCOUNTER — DOCUMENTATION ONLY (OUTPATIENT)
Dept: OTHER | Facility: CLINIC | Age: 78
End: 2023-05-26
Payer: COMMERCIAL

## 2023-05-29 DIAGNOSIS — I10 ESSENTIAL HYPERTENSION WITH GOAL BLOOD PRESSURE LESS THAN 130/80: ICD-10-CM

## 2023-05-29 DIAGNOSIS — E11.21 TYPE 2 DIABETES MELLITUS WITH DIABETIC NEPHROPATHY, WITH LONG-TERM CURRENT USE OF INSULIN (H): ICD-10-CM

## 2023-05-29 DIAGNOSIS — Z79.4 TYPE 2 DIABETES MELLITUS WITH DIABETIC NEPHROPATHY, WITH LONG-TERM CURRENT USE OF INSULIN (H): ICD-10-CM

## 2023-05-30 RX ORDER — AMLODIPINE BESYLATE 5 MG/1
TABLET ORAL
Qty: 90 TABLET | Refills: 3 | Status: SHIPPED | OUTPATIENT
Start: 2023-05-30 | End: 2024-05-07

## 2023-05-30 RX ORDER — PEN NEEDLE, DIABETIC 32GX 5/32"
NEEDLE, DISPOSABLE MISCELLANEOUS
Qty: 100 EACH | Refills: 3 | OUTPATIENT
Start: 2023-05-30

## 2023-05-30 NOTE — TELEPHONE ENCOUNTER
See telephone encounter/11/23.  Patient not using 31-gauge 4 mm insulin pen needles per request at that time from patient/pharmacy

## 2023-06-03 ENCOUNTER — HEALTH MAINTENANCE LETTER (OUTPATIENT)
Age: 78
End: 2023-06-03

## 2023-06-07 ENCOUNTER — OFFICE VISIT (OUTPATIENT)
Dept: URGENT CARE | Facility: URGENT CARE | Age: 78
End: 2023-06-07
Payer: COMMERCIAL

## 2023-06-07 VITALS
HEART RATE: 65 BPM | DIASTOLIC BLOOD PRESSURE: 82 MMHG | WEIGHT: 194 LBS | SYSTOLIC BLOOD PRESSURE: 167 MMHG | OXYGEN SATURATION: 100 % | TEMPERATURE: 98.6 F | BODY MASS INDEX: 35.48 KG/M2

## 2023-06-07 DIAGNOSIS — H69.91 DYSFUNCTION OF RIGHT EUSTACHIAN TUBE: Primary | ICD-10-CM

## 2023-06-07 DIAGNOSIS — J30.2 SEASONAL ALLERGIC RHINITIS, UNSPECIFIED TRIGGER: ICD-10-CM

## 2023-06-07 PROCEDURE — 99214 OFFICE O/P EST MOD 30 MIN: CPT | Performed by: PHYSICIAN ASSISTANT

## 2023-06-07 RX ORDER — PREDNISONE 10 MG/1
10 TABLET ORAL DAILY
Qty: 5 TABLET | Refills: 0 | Status: SHIPPED | OUTPATIENT
Start: 2023-06-07 | End: 2023-06-12

## 2023-06-07 RX ORDER — FLUTICASONE PROPIONATE 50 MCG
1 SPRAY, SUSPENSION (ML) NASAL DAILY
Qty: 18.2 ML | Refills: 0 | Status: SHIPPED | OUTPATIENT
Start: 2023-06-07 | End: 2023-10-23

## 2023-06-07 NOTE — PROGRESS NOTES
Patient presents with:  Ear Problem: Started around 3am this morning. Right ear having a harder time hearing and it feels plugged. Had a slight headache but now it is gone. Had eye lid surgery almost three weeks aago and had some swelling in both eyes. Wonders if that may be effecting her ear.     (H69.81) Dysfunction of right eustachian tube  (primary encounter diagnosis)  Comment: secondary to swelling in the nasal passages due to allergies.    Plan: fluticasone (FLONASE) 50 MCG/ACT nasal spray,         predniSONE (DELTASONE) 10 MG tablet            (J30.2) Seasonal allergic rhinitis, unspecified trigger  Comment:   Plan: fluticasone (FLONASE) 50 MCG/ACT nasal spray        Continue Allegra daily     Stay on Flonase 2 sprays each nostril at bedtime for minimum of 3 weeks.      She may use a saline nasal spray if her nose feels dry.      Follow up with primary clinic should symptoms persist or worsen.      31 minutes spent by me on the date of the encounter doing chart review, patient visit, documentation and discussion with family       SUBJECTIVE:   Essie Huddleston is a 78 year old female who presents today with right ear feeling plugged, having a harder time hearing.  Has also had some watery eyes and runny nose.  She did have eyelid surgery (to correct lid droop bilaterally) about 3 weeks ago.  She denies any eye pain.  Her eyes have been itchy in the past few days and watery as well.    She is here today with her daughter who helps with the history.      Past Medical History:   Diagnosis Date     Bilateral carpal tunnel syndrome 04/2021    Per EMG     Chronic right-sided low back pain with right-sided sciatica 12/29/2019     Chronic, continuous use of opioids 6/9/2021     Essential hypertension with goal blood pressure less than 130/80 12/29/2019     Hyperlipidemia LDL goal <100 06/20/2012     KIM (obstructive sleep apnea) 4/28/2023     Type 2 diabetes mellitus with diabetic nephropathy  (goal A1C<7) 10/24/2015      Vitamin D deficiency 06/20/2012         Current Outpatient Medications   Medication Sig Dispense Refill     Multiple Vitamins-Iron (DAILY-DOUGLAS/IRON/BETA-CAROTENE) TABS TAKE 1 TABLET BY MOUTH DAILY. (Patient not taking: Reported on 10/19/2020) 30 tablet 7     Social History     Tobacco Use     Smoking status: Never Smoker     Smokeless tobacco: Never Used   Substance Use Topics     Alcohol use: Not on file     Family History   Problem Relation Age of Onset     Diabetes Mother      Diabetes Father          ROS:    10 point ROS of systems including Constitutional, Eyes, Respiratory, Cardiovascular, Gastroenterology, Genitourinary, Integumentary, Muscularskeletal, Psychiatric ,neurological were all negative except for pertinent positives noted in my HPI       OBJECTIVE:  BP (!) 167/82   Pulse 65   Temp 98.6  F (37  C) (Oral)   Wt 88 kg (194 lb)   SpO2 100%   BMI 35.48 kg/m    Physical Exam:  GENERAL APPEARANCE: healthy, alert and no distress  EYES: EOMI,  PERRL, conjunctiva clear  HENT: ear canals and TM's normal.  Nose and mouth without ulcers, erythema or lesions  HENT: nasal turbinates boggy with bluish hue, rhinorrhea clear and right TM is retracted.  NECK: supple, nontender, no lymphadenopathy  RESP: lungs clear to auscultation - no rales, rhonchi or wheezes  CV: regular rates and rhythm, normal S1 S2, no murmur noted  NEURO: Normal strength and tone, sensory exam grossly normal,  normal speech and mentation  SKIN: no suspicious lesions or rashes

## 2023-06-07 NOTE — PATIENT INSTRUCTIONS
(H69.81) Dysfunction of right eustachian tube  (primary encounter diagnosis)  Comment: secondary to swelling in the nasal passages due to allergies.    Plan: fluticasone (FLONASE) 50 MCG/ACT nasal spray,         predniSONE (DELTASONE) 10 MG tablet            (J30.2) Seasonal allergic rhinitis, unspecified trigger  Comment:   Plan: fluticasone (FLONASE) 50 MCG/ACT nasal spray        Continue Allegra daily     Stay on Flonase 2 sprays each nostril at bedtime for minimum of 3 weeks.      She may use a saline nasal spray if her nose feels dry.      Follow up with primary clinic should symptoms persist or worsen.

## 2023-06-13 ENCOUNTER — OFFICE VISIT (OUTPATIENT)
Dept: AUDIOLOGY | Facility: CLINIC | Age: 78
End: 2023-06-13
Payer: COMMERCIAL

## 2023-06-13 ENCOUNTER — NURSE TRIAGE (OUTPATIENT)
Dept: NURSING | Facility: CLINIC | Age: 78
End: 2023-06-13

## 2023-06-13 ENCOUNTER — OFFICE VISIT (OUTPATIENT)
Dept: URGENT CARE | Facility: URGENT CARE | Age: 78
End: 2023-06-13
Payer: COMMERCIAL

## 2023-06-13 VITALS
TEMPERATURE: 97.3 F | BODY MASS INDEX: 35.12 KG/M2 | SYSTOLIC BLOOD PRESSURE: 136 MMHG | OXYGEN SATURATION: 99 % | DIASTOLIC BLOOD PRESSURE: 80 MMHG | HEART RATE: 66 BPM | WEIGHT: 192 LBS

## 2023-06-13 DIAGNOSIS — H91.20 SUDDEN-ONSET SENSORINEURAL HEARING LOSS: Primary | ICD-10-CM

## 2023-06-13 DIAGNOSIS — H91.21 SUDDEN HEARING LOSS, RIGHT: Primary | ICD-10-CM

## 2023-06-13 PROCEDURE — 92550 TYMPANOMETRY & REFLEX THRESH: CPT | Performed by: AUDIOLOGIST

## 2023-06-13 PROCEDURE — 92565 STENGER TEST PURE TONE: CPT | Performed by: AUDIOLOGIST

## 2023-06-13 PROCEDURE — 99214 OFFICE O/P EST MOD 30 MIN: CPT | Performed by: FAMILY MEDICINE

## 2023-06-13 PROCEDURE — 92557 COMPREHENSIVE HEARING TEST: CPT | Performed by: AUDIOLOGIST

## 2023-06-13 RX ORDER — PREDNISONE 20 MG/1
20 TABLET ORAL 2 TIMES DAILY
Qty: 14 TABLET | Refills: 0 | Status: SHIPPED | OUTPATIENT
Start: 2023-06-13 | End: 2023-06-20

## 2023-06-13 NOTE — PROGRESS NOTES
AUDIOLOGY REPORT    SUMMARY: Audiology visit completed. See audiogram for results.      RECOMMENDATIONS: Follow-up with ENT.    Ji Gomes, CCC-A  Clinical Audiologist  [place] #[id number]

## 2023-06-13 NOTE — TELEPHONE ENCOUNTER
"Nurse Triage SBAR    Is this a 2nd Level Triage? YES, LICENSED PRACTITIONER REVIEW IS REQUIRED    Situation:    Sudden onset hearing loss RIGHT EAre  came into Urgent Care 6/7/23     predniSONE (DELTASONE) 10 MG tablet daily x 5 days, fluticasone, allegra    No change in symptoms and was seen by PCP today 6/13/23 who put in Urgent ENT consult, and also restarted her Prednisoone at higher dosage of 20 mg BID x 7 days   No facial pain, rashes, dizziness, facial droop, ear pain nor ear drainage reported.  ENT/ Hearing history:  -No hearing problems ever, no issues.  -Not seen by  audiology nor ENT   ( Daughter notes Eyelid surgery last month with major swelling.She had prednisone eye drops at that time)    Background: patient is diabetic- type 2, uses insulin/ Levemir, glipizide.  > A1c 3/16/23=8.1  PMH includes back pain, ptosis of eyelids, hyperlipidemia, carpal tunnel, Type 2 diabetes    Assessment:Diabetic 78 year old with RIGHT sudden hearing loss x 7 days, has seen Urgent Care 6/7 and PCP today. Today prednisone was increased from 10 mg daily to 20 mg BID by Dr Knutson/ PCP     Recommendation: Please call with appt [phone] daughter Get\"Candis\" is caller and Essie is in background of call. No facial pain, rashes, dizziness, facial droop, ear pain nor ear drainage reported.RIGHT ear cannot her phone, no change in hearingsince 6/7/23    Routed to provider    Does the patient meet one of the following criteria for ADS visit consideration? No    Reason for Disposition    Hearing loss in one or both ears of sudden onset and present now    Additional Information    Negative: Followed an ear injury    Negative: Decreased hearing with nasal allergies    Negative: Part of a cold    Negative: Follows air travel or mountain driving    Negative: Earwax, questions about    Negative: Dizziness is main symptom    Negative: Tinnitus (e.g., ringing, hissing, beating) is main symptom    Negative: Patient sounds very " sick or weak to the triager    Negative: Ringing in the ears (tinnitus) and taking aspirin and dosage sounds high (i.e., > 1500 mg/day)    Protocols used: HEARING LOSS OR CHANGE-A-OH

## 2023-06-13 NOTE — PROGRESS NOTES
SUBJECTIVE: Essie Huddleston is a 78 year old female presenting with a chief complaint of sudden onset hearing loss rt ear.  Onset of symptoms was 1 week(s) ago.  Course of illness is same.    Current and Associated symptoms: occasional mild ha  Predisposing factors include None.    Past Medical History:   Diagnosis Date     Bilateral carpal tunnel syndrome 04/2021    Per EMG     Chronic right-sided low back pain with right-sided sciatica 12/29/2019     Chronic, continuous use of opioids 6/9/2021     Essential hypertension with goal blood pressure less than 130/80 12/29/2019     Hyperlipidemia LDL goal <100 06/20/2012     KIM (obstructive sleep apnea) 4/28/2023     Type 2 diabetes mellitus with diabetic nephropathy  (goal A1C<7) 10/24/2015     Vitamin D deficiency 06/20/2012     Allergies   Allergen Reactions     Metoprolol      cough     Social History     Tobacco Use     Smoking status: Never     Smokeless tobacco: Never   Vaping Use     Vaping status: Not on file   Substance Use Topics     Alcohol use: Yes     Comment: seldom 1 glass wine.       ROS:  SKIN: no rash  GI: no vomiting    OBJECTIVE:  /80   Pulse 66   Temp 97.3  F (36.3  C) (Tympanic)   Wt 87.1 kg (192 lb)   SpO2 99%   BMI 35.12 kg/m  GENERAL APPEARANCE: healthy, alert and no distress  EYES: EOMI,  PERRL, conjunctiva clear  HENT: ear canals and TM's normal.  Nose and mouth without ulcers, erythema or lesions, unable to hear out of left ear  NEURO: Normal strength and tone, sensory exam grossly normal,  normal speech and mentation  SKIN: no suspicious lesions or rashes      ICD-10-CM    1. Sudden-onset sensorineural hearing loss  H91.20 predniSONE (DELTASONE) 20 MG tablet     Adult ENT  Referral          Fluids/Rest, f/u if worse/not any better

## 2023-06-14 DIAGNOSIS — F11.90 CHRONIC, CONTINUOUS USE OF OPIOIDS: ICD-10-CM

## 2023-06-14 DIAGNOSIS — G89.29 CHRONIC RIGHT-SIDED LOW BACK PAIN WITH RIGHT-SIDED SCIATICA: ICD-10-CM

## 2023-06-14 DIAGNOSIS — M54.41 CHRONIC RIGHT-SIDED LOW BACK PAIN WITH RIGHT-SIDED SCIATICA: ICD-10-CM

## 2023-06-14 RX ORDER — TRAMADOL HYDROCHLORIDE 50 MG/1
50 TABLET ORAL 2 TIMES DAILY
Qty: 60 TABLET | Refills: 4 | OUTPATIENT
Start: 2023-06-14

## 2023-06-14 NOTE — TELEPHONE ENCOUNTER
Per review of [place] , tramadol was just filled yesterday so refill again today not appropriate.  Declined

## 2023-06-15 ENCOUNTER — OFFICE VISIT (OUTPATIENT)
Dept: OTOLARYNGOLOGY | Facility: CLINIC | Age: 78
End: 2023-06-15
Payer: COMMERCIAL

## 2023-06-15 ENCOUNTER — TELEPHONE (OUTPATIENT)
Dept: OTOLARYNGOLOGY | Facility: CLINIC | Age: 78
End: 2023-06-15

## 2023-06-15 ENCOUNTER — PRE VISIT (OUTPATIENT)
Dept: OTOLARYNGOLOGY | Facility: CLINIC | Age: 78
End: 2023-06-15

## 2023-06-15 VITALS
HEART RATE: 78 BPM | BODY MASS INDEX: 34.96 KG/M2 | DIASTOLIC BLOOD PRESSURE: 73 MMHG | SYSTOLIC BLOOD PRESSURE: 144 MMHG | TEMPERATURE: 97.7 F | OXYGEN SATURATION: 99 % | HEIGHT: 62 IN | WEIGHT: 190 LBS

## 2023-06-15 DIAGNOSIS — H90.A21 SENSORINEURAL HEARING LOSS (SNHL) OF RIGHT EAR WITH RESTRICTED HEARING OF LEFT EAR: ICD-10-CM

## 2023-06-15 DIAGNOSIS — H91.20 SUDDEN-ONSET SENSORINEURAL HEARING LOSS: Primary | ICD-10-CM

## 2023-06-15 PROCEDURE — 99203 OFFICE O/P NEW LOW 30 MIN: CPT | Mod: 25 | Performed by: OTOLARYNGOLOGY

## 2023-06-15 PROCEDURE — 69801 INCISE INNER EAR: CPT | Mod: RT | Performed by: OTOLARYNGOLOGY

## 2023-06-15 ASSESSMENT — PAIN SCALES - GENERAL: PAINLEVEL: NO PAIN (0)

## 2023-06-15 NOTE — LETTER
6/15/2023      RE: Essie Huddleston  [address]  [facility] [address]       Essie Huddleston is seen for a right intratympanic steroid injection. She is here with her daughter. She had a sudden hearing loss one week ago associated with vertigo and imbalance. She has diabetes and was started on a small oral steroid dose by her PCP but she has been having difficulty keeping her glucose levels controlled. Her hearing has not recovered. She continues to be imbalanced although it is slowly improving.    Physical examination:  female in no acute distress.  Alert and answering questions appropriately.  HB 1/6 bilaterally.  Both ears examined under the microscope. Ear canals clear, TMs intact with aerated middle ears.    Procedure:  Consent for right intratympanic steroid injection was completed. Time Out was performed with confirmation of the patient, site and procedure. The microscope was used for the entire procedure. The right TM was anesthetized with phenol. Approximately 0.3cc of kenalog 24mg/cc was injected into the middle ear. She tolerated the procedure well and was instructed to lay still for 20 minutes.     Audiogram:  Audiogram from 2 days ago was independently reviewed. Audiogram done yesterday showed a profound right sensorineural hearing loss with 0% speech discrimination and left mild sensorineural hearing loss with excellent speech discrimination. Normal tympanograms, absent right reflexes, present left.    Assessment and plan:  Right sudden sensorineural hearing loss. We discussed the recommendation for a series of injections and we will get her set up for visits next week. I also discussed that it is likely she will not regain useful hearing given her profound hearing loss. For her imbalance, we will put in an order for vestibular therapy. They are in agreement with the plan.      Noemi Wright MD

## 2023-06-15 NOTE — NURSING NOTE
"Chief Complaint   Patient presents with     Consult     Dexamethasone injection     Blood pressure (!) 144/73, pulse 78, temperature 97.7  F (36.5  C), height 1.575 m (5' 2\"), weight 86.2 kg (190 lb), SpO2 99 %, not currently breastfeeding.    Francis Bryant LPN    "

## 2023-06-15 NOTE — TELEPHONE ENCOUNTER
FUTURE VISIT INFORMATION:      FUTURE VISIT INFORMATION:    Date: 6/15/23    Time: 9:45 AM    Location: [facility]  REFERRAL INFORMATION:    Referring provider:     Referring providers clinic:     Reason for visit/diagnosis:  steroid injection    RECORDS REQUESTED FROM:       Clinic name Comments Records Status Imaging Status   [facility] Audiology [facility] 6/13/23 audiogram Epic    [facility] Urgent Care [facility] 6/13/23 OV visit Brayan Knutson DO  6/7/23 OV with Catherine Alvarez PA-C Epic

## 2023-06-15 NOTE — PROGRESS NOTES
Essie Huddleston is seen for a right intratympanic steroid injection. She is here with her daughter. She had a sudden hearing loss one week ago associated with vertigo and imbalance. She has diabetes and was started on a small oral steroid dose by her PCP but she has been having difficulty keeping her glucose levels controlled. Her hearing has not recovered. She continues to be imbalanced although it is slowly improving.    Physical examination:  female in no acute distress.  Alert and answering questions appropriately.  HB 1/6 bilaterally.  Both ears examined under the microscope. Ear canals clear, TMs intact with aerated middle ears.    Procedure:  Consent for right intratympanic steroid injection was completed. Time Out was performed with confirmation of the patient, site and procedure. The microscope was used for the entire procedure. The right TM was anesthetized with phenol. Approximately 0.3cc of kenalog 24mg/cc was injected into the middle ear. She tolerated the procedure well and was instructed to lay still for 20 minutes.     Audiogram:  Audiogram from 2 days ago was independently reviewed. Audiogram done yesterday showed a profound right sensorineural hearing loss with 0% speech discrimination and left mild sensorineural hearing loss with excellent speech discrimination. Normal tympanograms, absent right reflexes, present left.    Assessment and plan:  Right sudden sensorineural hearing loss. We discussed the recommendation for a series of injections and we will get her set up for visits next week. I also discussed that it is likely she will not regain useful hearing given her profound hearing loss. For her imbalance, we will put in an order for vestibular therapy. They are in agreement with the plan.

## 2023-06-15 NOTE — PATIENT INSTRUCTIONS
You were seen in the ENT Clinic today by Dr. Wright. If you have any questions or concerns after your appointment, please contact us (see below)      2.   Please return to clinic on 6/20 and 6/23.         How to Contact Us:  Send a [employer] message to your provider. Our team will respond to you via [employer]. Occasionally, we will need to call you to get further information.  For urgent matters (Monday-Friday), call the ENT Clinic: [phone] and speak with a call center team member - they will route your call appropriately.   If you'd like to speak directly with a nurse, please find our contact information below. We do our best to check voicemail frequently throughout the day, and will work to call you back within 1-2 days. For urgent matters, please use the general clinic phone numbers listed above.      Urmila ESTEVES RN  ENT RN Care Coordinator  Direct: [phone]    Tracee BELTRE LPN  Direct: [phone]

## 2023-06-20 ENCOUNTER — OFFICE VISIT (OUTPATIENT)
Dept: OTOLARYNGOLOGY | Facility: CLINIC | Age: 78
End: 2023-06-20
Payer: COMMERCIAL

## 2023-06-20 VITALS
SYSTOLIC BLOOD PRESSURE: 132 MMHG | BODY MASS INDEX: 35.51 KG/M2 | WEIGHT: 193 LBS | TEMPERATURE: 96.5 F | HEIGHT: 62 IN | DIASTOLIC BLOOD PRESSURE: 74 MMHG | OXYGEN SATURATION: 98 % | HEART RATE: 75 BPM

## 2023-06-20 DIAGNOSIS — H91.20 SUDDEN-ONSET SENSORINEURAL HEARING LOSS: Primary | ICD-10-CM

## 2023-06-20 DIAGNOSIS — R42 DIZZINESS: ICD-10-CM

## 2023-06-20 PROCEDURE — 99213 OFFICE O/P EST LOW 20 MIN: CPT | Mod: 25 | Performed by: OTOLARYNGOLOGY

## 2023-06-20 PROCEDURE — 69801 INCISE INNER EAR: CPT | Mod: RT | Performed by: OTOLARYNGOLOGY

## 2023-06-20 ASSESSMENT — PAIN SCALES - GENERAL: PAINLEVEL: NO PAIN (0)

## 2023-06-20 NOTE — PROGRESS NOTES
Essie Huddleston is seen for her second right intratympanic steroid injection. She is here with her daughter. She had a sudden profound hearing loss one and one half weeks ago associated with vertigo and imbalance. She has diabetes and was started on a small oral steroid dose by her PCP but she has been having difficulty keeping her glucose levels controlled. She continues to be imbalanced although it is slowly improving.    Dr. Wright performed the for steroid injection last week.  She also referred her for vestibular therapy. Since that, she has been noticing tinnitus, high pitched sounds in her right side. She has light sense of imbalance when she wakes up in the morning but improved in comparison to when her symptoms started.     Physical examination:  Female in no acute distress.  Alert and answering questions appropriately.  HB 1/6 bilaterally.  Both ears examined under the microscope.   - Right: Ear canal clear. TM with inferior monomer from prior phenol application. Pinpoint perforation present. Middle ear aerated.   - Left: Ear canals clear, TMs intact with aerated middle ears.    Procedure:  Consent for right intratympanic steroid injection was completed. Time Out was performed with confirmation of the patient, site and procedure. The microscope was used for the entire procedure. The right TM was not anesthetized with phenol. Approximately 0.3 cc of dexamethasone 24mg/cc was injected into the middle ear. She tolerated the procedure well and was instructed to lay still for 20 minutes.     Audiogram:  The audiogram from 6/13/23 showed a profound right sensorineural hearing loss with 0% speech discrimination and left mild sensorineural hearing loss with excellent speech discrimination. Normal tympanograms, absent right reflexes, present left.        Right: Speech reception threshold is NR dB with 0 % word recognition. Tympanogram A type   Left: Speech reception threshold is 20 dB with 96 % word recognition.  Tympanogram A type       Assessment and plan:    Essie Huddleston is a 78 year old female presenting with    1) Right side sudden sensorineural hearing loss (onset early June, profound sensorineural hearing loss, WRS 0%)     We performed second right side repeat intratympanic steroid injection today. We recommended MRI IAC to rule out retrocochlear pathology and vestibular therapy. We will continue serial injections and monitor her symptoms. We previously discussed that it is likely she may not regain useful hearing given her profound hearing loss. The patient expressed understanding and is in agreement with this plan.  All questions were answered.    Kristin Whalen MD MPH   Fellow Physician  Otology & Neurotology  [facility]     Rose Padilla MD  Otology & Neurotology  [facility]    I, Rose Padilla MD, saw this patient with the resident/fellow and agree with the resident s findings and plan of care as documented in the resident s/fellow s note. I was present for the entire procedure.

## 2023-06-20 NOTE — LETTER
6/20/2023       RE: Essie Huddleston  [address]  [facility] [address]     Dear Colleague,    Thank you for referring your patient, Essie Huddleston, to the [facility] EAR NOSE AND THROAT CLINIC [place] [facility]. Please see a copy of my visit note below.    Essie Huddleston is seen for her second right intratympanic steroid injection. She is here with her daughter. She had a sudden profound hearing loss one and one half weeks ago associated with vertigo and imbalance. She has diabetes and was started on a small oral steroid dose by her PCP but she has been having difficulty keeping her glucose levels controlled. She continues to be imbalanced although it is slowly improving.    Dr. Wright performed the for steroid injection last week.  She also referred her for vestibular therapy. Since that, she has been noticing tinnitus, high pitched sounds in her right side. She has light sense of imbalance when she wakes up in the morning but improved in comparison to when her symptoms started.     Physical examination:  Female in no acute distress.  Alert and answering questions appropriately.  HB 1/6 bilaterally.  Both ears examined under the microscope.   - Right: Ear canal clear. TM with inferior monomer from prior phenol application. Pinpoint perforation present. Middle ear aerated.   - Left: Ear canals clear, TMs intact with aerated middle ears.    Procedure:  Consent for right intratympanic steroid injection was completed. Time Out was performed with confirmation of the patient, site and procedure. The microscope was used for the entire procedure. The right TM was not anesthetized with phenol. Approximately 0.3 cc of dexamethasone 24mg/cc was injected into the middle ear. She tolerated the procedure well and was instructed to lay still for 20 minutes.     Audiogram:  The audiogram from 6/13/23 showed a profound right sensorineural hearing loss with 0% speech  discrimination and left mild sensorineural hearing loss with excellent speech discrimination. Normal tympanograms, absent right reflexes, present left.        Right: Speech reception threshold is NR dB with 0 % word recognition. Tympanogram A type   Left: Speech reception threshold is 20 dB with 96 % word recognition. Tympanogram A type       Assessment and plan:    Essie Huddleston is a 78 year old female presenting with    1) Right side sudden sensorineural hearing loss (onset early June, profound sensorineural hearing loss, WRS 0%)     We performed second right side repeat intratympanic steroid injection today. We recommended MRI IAC to rule out retrocochlear pathology and vestibular therapy. We will continue serial injections and monitor her symptoms. We previously discussed that it is likely she may not regain useful hearing given her profound hearing loss. The patient expressed understanding and is in agreement with this plan.  All questions were answered.    Kristin Whalen MD MPH   Fellow Physician  Otology & Neurotology  [facility]     Rose Padilla MD  Otology & Neurotology  [facility]    I, Rose Padilla MD, saw this patient with the resident/fellow and agree with the resident s findings and plan of care as documented in the resident s/fellow s note. I was present for the entire procedure.          Again, thank you for allowing me to participate in the care of your patient.      Sincerely,    Rose Padilla MD

## 2023-06-20 NOTE — PATIENT INSTRUCTIONS
You were seen in the ENT Clinic today by Dr. Padilla. If you have any questions or concerns after your appointment, please contact us (see below)      2.   Please return to clinic this Friday for your scheduled appointment.         How to Contact Us:  Send a [employer] message to your provider. Our team will respond to you via [employer]. Occasionally, we will need to call you to get further information.  For urgent matters (Monday-Friday), call the ENT Clinic: [phone] and speak with a call center team member - they will route your call appropriately.   If you'd like to speak directly with a nurse, please find our contact information below. We do our best to check voicemail frequently throughout the day, and will work to call you back within 1-2 days. For urgent matters, please use the general clinic phone numbers listed above.      Urmila ESTEVES RN  ENT RN Care Coordinator  Direct: [phone]    Tracee BELTRE LPN  Direct: [phone]

## 2023-06-20 NOTE — NURSING NOTE
"Chief Complaint   Patient presents with     RECHECK     Follow up      Blood pressure 132/74, pulse 75, temperature (!) 96.5  F (35.8  C), height 1.575 m (5' 2\"), weight 87.5 kg (193 lb), SpO2 98 %, not currently breastfeeding.  'Francis Bryant LPN    "

## 2023-06-21 DIAGNOSIS — E11.21 TYPE 2 DIABETES MELLITUS WITH DIABETIC NEPHROPATHY, WITH LONG-TERM CURRENT USE OF INSULIN (H): ICD-10-CM

## 2023-06-21 DIAGNOSIS — M54.41 CHRONIC RIGHT-SIDED LOW BACK PAIN WITH RIGHT-SIDED SCIATICA: ICD-10-CM

## 2023-06-21 DIAGNOSIS — F11.90 CHRONIC, CONTINUOUS USE OF OPIOIDS: ICD-10-CM

## 2023-06-21 DIAGNOSIS — G89.29 CHRONIC RIGHT-SIDED LOW BACK PAIN WITH RIGHT-SIDED SCIATICA: ICD-10-CM

## 2023-06-21 DIAGNOSIS — Z79.4 TYPE 2 DIABETES MELLITUS WITH DIABETIC NEPHROPATHY, WITH LONG-TERM CURRENT USE OF INSULIN (H): ICD-10-CM

## 2023-06-23 ENCOUNTER — OFFICE VISIT (OUTPATIENT)
Dept: OTOLARYNGOLOGY | Facility: CLINIC | Age: 78
End: 2023-06-23
Payer: COMMERCIAL

## 2023-06-23 VITALS
HEIGHT: 62 IN | BODY MASS INDEX: 35.33 KG/M2 | WEIGHT: 192 LBS | SYSTOLIC BLOOD PRESSURE: 149 MMHG | DIASTOLIC BLOOD PRESSURE: 80 MMHG | HEART RATE: 70 BPM

## 2023-06-23 DIAGNOSIS — H91.20 SUDDEN-ONSET SENSORINEURAL HEARING LOSS: Primary | ICD-10-CM

## 2023-06-23 DIAGNOSIS — R42 DIZZINESS: ICD-10-CM

## 2023-06-23 PROCEDURE — 69801 INCISE INNER EAR: CPT | Mod: RT | Performed by: OTOLARYNGOLOGY

## 2023-06-23 RX ORDER — GLIPIZIDE 5 MG/1
TABLET ORAL
Qty: 180 TABLET | Refills: 3 | Status: SHIPPED | OUTPATIENT
Start: 2023-06-23 | End: 2024-02-05

## 2023-06-23 RX ORDER — TRAMADOL HYDROCHLORIDE 50 MG/1
TABLET ORAL
Qty: 60 TABLET | Refills: 0 | OUTPATIENT
Start: 2023-06-23

## 2023-06-23 ASSESSMENT — PAIN SCALES - GENERAL: PAINLEVEL: NO PAIN (0)

## 2023-06-23 NOTE — PATIENT INSTRUCTIONS
You were seen in the ENT Clinic today by Dr. Padilla. If you have any questions or concerns after your appointment, please contact us (see below)      2.   Please return for an audiogram in 10 days to 2 weeks from now.         How to Contact Us:  Send a [employer] message to your provider. Our team will respond to you via [employer]. Occasionally, we will need to call you to get further information.  For urgent matters (Monday-Friday), call the ENT Clinic: [phone] and speak with a call center team member - they will route your call appropriately.   If you'd like to speak directly with a nurse, please find our contact information below. We do our best to check voicemail frequently throughout the day, and will work to call you back within 1-2 days. For urgent matters, please use the general clinic phone numbers listed above.      Urmila ESTEVES RN  ENT RN Care Coordinator  Direct: [phone]    Tracee BELTRE LPN  Direct: [phone]         
WDL

## 2023-06-23 NOTE — PROGRESS NOTES
6/23/23    Essie Huddleston presented for her third intratympanic steroid injection for right sudden sensorineural hearing loss.  She is unsure if she has experienced any hearing improvement since the prior to injections.  Sometimes she hears tinnitus in the ear.  She is not having vertigo but feels somewhat off balance.  Her MRI and vestibular therapy are set up for next week.  She is having some left shoulder pain, and has had right shoulder pain in the past.  Her blood glucoses have been fluctuating with some good days and some bad.  She is not taking oral steroids at present.    Right intra-tympanic steroid injection:  Procedure:  Informed consent was obtained and signed.  Time Out was conducted with confirmation of the patient's name, side of the procedure and procedure to be done.      Using the otologic microscope the patient's ear canal and tympanic membrane were examined.  I placed the needle through the tympanic membrane hole that is already present and the size of the needle and filled the middle ear space with Dexamethasone 24 mg/mL, total volume 0.4 mL.  Patient rested with that ear upwards for 20 - 25 minutes and refrained from swallowing during that time period.    Impression and plan:   Right sudden sensorineural hearing loss with dizziness, idiopathic at present (MRI pending)    We will need to follow-up on MRI which will be performed next week on Wednesday and we will plan to call with the results.  She is going to start vestibular physical therapy the first week of July. I have recommended that she get an audiogram about 2 weeks from today and we will review this to discuss next steps which may include hearing aid consultation for amplification.  We discussed the concept of a CROS hearing aid today.  I recommended that she discuss her shoulder pain and blood glucose control with her primary care physician as soon as possible.  She and her daughter indicated that they were comfortable with this plan  and would contact us in the meantime if any new symptoms or concerns arise.    Rose Padilla MD  Otology & Neurotology  [facility]

## 2023-06-23 NOTE — LETTER
6/23/2023       RE: Essie Huddleston  [address]  [facility] [address]     Dear Colleague,    Thank you for referring your patient, Essie Huddleston, to the [facility] EAR NOSE AND THROAT CLINIC [place] [facility]. Please see a copy of my visit note below.    6/23/23    Essie Huddleston presented for her third intratympanic steroid injection for right sudden sensorineural hearing loss.  She is unsure if she has experienced any hearing improvement since the prior to injections.  Sometimes she hears tinnitus in the ear.  She is not having vertigo but feels somewhat off balance.  Her MRI and vestibular therapy are set up for next week.  She is having some left shoulder pain, and has had right shoulder pain in the past.  Her blood glucoses have been fluctuating with some good days and some bad.  She is not taking oral steroids at present.    Right intra-tympanic steroid injection:  Procedure:  Informed consent was obtained and signed.  Time Out was conducted with confirmation of the patient's name, side of the procedure and procedure to be done.      Using the otologic microscope the patient's ear canal and tympanic membrane were examined.  I placed the needle through the tympanic membrane hole that is already present and the size of the needle and filled the middle ear space with Dexamethasone 24 mg/mL, total volume 0.4 mL.  Patient rested with that ear upwards for 20 - 25 minutes and refrained from swallowing during that time period.    Impression and plan:   Right sudden sensorineural hearing loss with dizziness, idiopathic at present (MRI pending)    We will need to follow-up on MRI which will be performed next week on Wednesday and we will plan to call with the results.  She is going to start vestibular physical therapy the first week of July. I have recommended that she get an audiogram about 2 weeks from today and we will review this to discuss next steps  which may include hearing aid consultation for amplification.  We discussed the concept of a CROS hearing aid today.  I recommended that she discuss her shoulder pain and blood glucose control with her primary care physician as soon as possible.  She and her daughter indicated that they were comfortable with this plan and would contact us in the meantime if any new symptoms or concerns arise.    Rose Padilla MD  Otology & Neurotology  [facility]

## 2023-06-23 NOTE — NURSING NOTE
"Chief Complaint   Patient presents with     RECHECK       Blood pressure (!) 149/80, pulse 70, height 1.575 m (5' 2\"), weight 87.1 kg (192 lb), not currently breastfeeding.    Debbie Wiggins, EMT    "

## 2023-06-28 ENCOUNTER — HOSPITAL ENCOUNTER (OUTPATIENT)
Dept: MRI IMAGING | Facility: CLINIC | Age: 78
Discharge: HOME | End: 2023-06-28
Attending: OTOLARYNGOLOGY | Admitting: OTOLARYNGOLOGY
Payer: COMMERCIAL

## 2023-06-28 DIAGNOSIS — H91.20 SUDDEN-ONSET SENSORINEURAL HEARING LOSS: ICD-10-CM

## 2023-06-28 PROCEDURE — A9585 GADOBUTROL INJECTION: HCPCS | Performed by: OTOLARYNGOLOGY

## 2023-06-28 PROCEDURE — 255N000002 HC RX 255 OP 636: Performed by: OTOLARYNGOLOGY

## 2023-06-28 PROCEDURE — 70543 MRI ORBT/FAC/NCK W/O &W/DYE: CPT

## 2023-06-28 RX ORDER — GADOBUTROL 604.72 MG/ML
9 INJECTION INTRAVENOUS ONCE
Status: COMPLETED | OUTPATIENT
Start: 2023-06-28 | End: 2023-06-28

## 2023-06-28 RX ADMIN — GADOBUTROL 9 ML: 604.72 INJECTION INTRAVENOUS at 13:06

## 2023-07-05 ENCOUNTER — OFFICE VISIT (OUTPATIENT)
Dept: AUDIOLOGY | Facility: CLINIC | Age: 78
End: 2023-07-05
Payer: COMMERCIAL

## 2023-07-05 ENCOUNTER — THERAPY VISIT (OUTPATIENT)
Dept: PHYSICAL THERAPY | Facility: CLINIC | Age: 78
End: 2023-07-05
Attending: OTOLARYNGOLOGY
Payer: COMMERCIAL

## 2023-07-05 DIAGNOSIS — H90.3 SENSORINEURAL HEARING LOSS, ASYMMETRICAL: Primary | ICD-10-CM

## 2023-07-05 DIAGNOSIS — H91.20 SUDDEN-ONSET SENSORINEURAL HEARING LOSS: ICD-10-CM

## 2023-07-05 DIAGNOSIS — R42 DIZZINESS: ICD-10-CM

## 2023-07-05 PROCEDURE — 97161 PT EVAL LOW COMPLEX 20 MIN: CPT | Mod: GP | Performed by: PHYSICAL THERAPIST

## 2023-07-05 PROCEDURE — 92550 TYMPANOMETRY & REFLEX THRESH: CPT | Mod: 52 | Performed by: AUDIOLOGIST

## 2023-07-05 PROCEDURE — 92557 COMPREHENSIVE HEARING TEST: CPT | Performed by: AUDIOLOGIST

## 2023-07-05 PROCEDURE — 97112 NEUROMUSCULAR REEDUCATION: CPT | Mod: GP | Performed by: PHYSICAL THERAPIST

## 2023-07-05 NOTE — PROGRESS NOTES
PHYSICAL THERAPY EVALUATION  Type of Visit: Evaluation  Patient 15 min late to [facility].   See electronic medical record for Abuse and Falls Screening details.    Subjective      Presenting condition or subjective complaint:  Sudden dizziness and hearing loss a few weeks ago.  Still loses balance daily.  Does not fall, catches self on things.  At times getting up out of bed.  Sitting up from a chair.  Walking.    Date of onset: 06/13/23    Relevant medical history:     Dates & types of surgery:      Prior diagnostic imaging/testing results:     MRI - negative  Prior therapy history for the same diagnosis, illness or injury:        Prior Level of Function   Transfers: Independent  Ambulation: Independent  ADL: Independent  IADL:     Living Environment  Social support:   Here with daughter who she lives with.    Type of home:   House  Stairs to enter the home:       none  Ramp:     Stairs inside the home:       Has some - does ok holding onto the rail  Help at home:    Equipment owned:       Employment:    retired  Hobbies/Interests:  Likes to clean, washing dishes, cooking, sweeping.      Patient goals for therapy:  return to cleaning    Pain assessment: Pain denied     Objective   Cognitive Status Examination  Orientation: Oriented to person, place and time   Level of Consciousness: Alert  Follows Commands and Answers Questions: 100% of the time  Personal Safety and Judgement: Intact  Memory: Intact    OBSERVATION:   INTEGUMENTARY: Intact  POSTURE: WNL  PALPATION:   RANGE OF MOTION: LE ROM WNL  UE ROM WNL  STRENGTH: Not formally tested today    BED MOBILITY: WNL    TRANSFERS: WNL    WHEELCHAIR MOBILITY:     GAIT:   Level of [place]: WNL  Assistive Device(s): None  Gait Deviations: Stride length decreased  Day decreased - See FGA  Gait Distance: FGA  Stairs:     BALANCE:     SPECIAL TESTS  Functional Gait Assessment (FGA) TOTAL SCORE: (MAXIMUM SCORE 30): 7    10 Meter Walk Test (Comfortable)     10 Meter Walk  Test (Fast)     6 Minute Walk Test (6MWT)           Simmons Balance Scale (BBS)     5 Times Sit-to-Stand (5TSTS)       Dynamic Gait Index (DGI)     Timed Up and Go (TUG) - sec    Single Leg Stance Right (sec)    Single Leg Stance Left (sec)    Modified CTSIB Conditions (sec) Cond 1: 30  Cond 2: 20  Cond 4: 30  Cond 5 : 30   Romberg  (sec)    Sharpened Romberg (sec)    30 Second Sit to Stand (reps/height)                        Pertinent visual history:   Pertinent history of current vestibular problem:   DHI:      Cervicogenic Screen    Neck ROM Normal     Oculomotor Screen    Ocular ROM Normal   Smooth Pursuit Normal   Saccades Normal   VOR Normal   VOR Cancellation Normal   Head Impulse Test Normal   Convergence Testing NT            Dynamic Visual Acuity (DVA) - NT due to time    Static Acuity (LogMar)    Horizontal Head Movement at 1 Hz (LogMar)    Horizontal Head Movement at 2 Hz (LogMar)           Assessment & Plan   CLINICAL IMPRESSIONS   Medical Diagnosis: Dizziness    Treatment Diagnosis: Sensory Selection Deficit   Impression/Assessment: Patient is a 78 year old female with imbalance complaints.  Symptoms started nearly 3 weeks ago with sudden onset vertigo and hearing loss.  Today she shows significantly abnormal score on FGA. A VHIT was performed and was WNLS.  The following significant findings have been identified: Impaired balance, Impaired gait and Disequilibrium . These impairments interfere with their ability to perform work tasks, recreational activities and household chores as compared to previous level of function.  She would benefit from a course of skilled PT intervention to return to her prior level of function    Clinical Decision Making (Complexity):   Clinical Presentation: Stable/Uncomplicated  Clinical Presentation Rationale: based on medical and personal factors listed in PT evaluation  Clinical Decision Making (Complexity): Low complexity    PLAN OF CARE  Treatment  Interventions:  Interventions: Gait Training, Neuromuscular Re-education, Therapeutic Activity, Therapeutic Exercise, Self-Care/Home Management    Long Term Goals     PT Goal 1  Goal Identifier: FGA  Goal Description: Patient to improve score to 22  Rationale: to maximize safety and independence within the home;to maximize safety and independence within the community  Target Date: 09/27/23  PT Goal 2  Goal Identifier: HEP  Goal Description: Patient to be independent with home program to assist in return to prior level of function and to  Rationale: to maximize safety and independence within the community  Target Date: 09/27/23  PT Goal 3  Goal Identifier: mCSTIB  Goal Description: Patient to score 120 seconds  Rationale: to maximize safety and independence within the community;to maximize safety and independence within the home  Target Date: 09/27/23  PT Goal 4  Goal Identifier: DVA  Goal Description: Patient to complete DVA scoring within normal limits  Rationale: to maximize safety and independence within the home;to maximize safety and independence within the community  Target Date: 09/27/23      Frequency of Treatment: 1 time per week  Duration of Treatment: 8 weeks    Recommended Referrals to Other Professionals:   Education Assessment:   Learner/Method: Patient;Listening;Demonstration  Education Comments: HEP and POC    Risks and benefits of evaluation/treatment have been explained.   Patient/Family/caregiver agrees with Plan of Care.     Evaluation Time:     PT Eval, Low Complexity Minutes (68420): 20      Signing Clinician: Kerline Ruiz PT

## 2023-07-05 NOTE — PROGRESS NOTES
AUDIOLOGY REPORT    SUMMARY: Audiology visit completed. See audiogram for results.      RECOMMENDATIONS: Follow-up with ENT.      Bola Mccann  Audiologist  [place] License  #[id number]

## 2023-07-06 DIAGNOSIS — H90.A21 SENSORINEURAL HEARING LOSS (SNHL) OF RIGHT EAR WITH RESTRICTED HEARING OF LEFT EAR: ICD-10-CM

## 2023-07-06 DIAGNOSIS — H91.20 SUDDEN-ONSET SENSORINEURAL HEARING LOSS: Primary | ICD-10-CM

## 2023-07-10 ENCOUNTER — THERAPY VISIT (OUTPATIENT)
Dept: PHYSICAL THERAPY | Facility: CLINIC | Age: 78
End: 2023-07-10
Payer: COMMERCIAL

## 2023-07-10 DIAGNOSIS — R42 DIZZINESS: ICD-10-CM

## 2023-07-10 DIAGNOSIS — H91.20 SUDDEN-ONSET SENSORINEURAL HEARING LOSS: Primary | ICD-10-CM

## 2023-07-10 PROCEDURE — 97112 NEUROMUSCULAR REEDUCATION: CPT | Mod: GP | Performed by: PHYSICAL THERAPIST

## 2023-07-10 PROCEDURE — 97750 PHYSICAL PERFORMANCE TEST: CPT | Mod: GP | Performed by: PHYSICAL THERAPIST

## 2023-07-10 NOTE — PROGRESS NOTES
Computerized Dynamic Posturography (CDP)     Background Information: Essie Huddleston  was seen today for a Computerized Dynamic Posturography (CDP) evaluation. Patient reports symptoms of imbalance   Medical history of   Past Medical History:   Diagnosis Date     Bilateral carpal tunnel syndrome 04/2021    Per EMG     Chronic right-sided low back pain with right-sided sciatica 12/29/2019     Chronic, continuous use of opioids 6/9/2021     Essential hypertension with goal blood pressure less than 130/80 12/29/2019     Hyperlipidemia LDL goal <100 06/20/2012     KIM (obstructive sleep apnea) 4/28/2023     Type 2 diabetes mellitus with diabetic nephropathy  (goal A1C<7) 10/24/2015     Vitamin D deficiency 06/20/2012         Test Results and Procedures:     Sensory Organization Test:   Overall composite equilibrium score was 34  Age matched normative value is above 63 .    Falls were recorded on conditions 1/3 3 and 4, 3/3 5 and 6  Imbalance demonstrated on conditions .  Symptoms reported on conditions 3-6        Summary and Recommendations:   These findings are consistent with decreased use of vestibular cues in balance  The patient's ability to maintain upright stance under a variety of changing sensory inputs is intact.  Recommend follow up with continued PT  Continued vestibular rehab may be helpful/beneficial

## 2023-07-12 DIAGNOSIS — F11.90 CHRONIC, CONTINUOUS USE OF OPIOIDS: ICD-10-CM

## 2023-07-12 DIAGNOSIS — M54.41 CHRONIC RIGHT-SIDED LOW BACK PAIN WITH RIGHT-SIDED SCIATICA: ICD-10-CM

## 2023-07-12 DIAGNOSIS — G89.29 CHRONIC RIGHT-SIDED LOW BACK PAIN WITH RIGHT-SIDED SCIATICA: ICD-10-CM

## 2023-07-12 RX ORDER — TRAMADOL HYDROCHLORIDE 50 MG/1
TABLET ORAL
Qty: 60 TABLET | Refills: 3 | Status: SHIPPED | OUTPATIENT
Start: 2023-07-12 | End: 2023-11-02

## 2023-07-12 NOTE — TELEPHONE ENCOUNTER
Timing of RF appropriate.  UTD re: clinic visit/evisit (with current level of clinic overcrowded scheduling and stability with controlled substance prescription dosing use, patient being seen every 6 months). [place]  site [place] file. Rx electronically sent to pharmacy. See [facility] note to pt

## 2023-07-17 ENCOUNTER — THERAPY VISIT (OUTPATIENT)
Dept: PHYSICAL THERAPY | Facility: CLINIC | Age: 78
End: 2023-07-17
Payer: COMMERCIAL

## 2023-07-17 DIAGNOSIS — H91.20 SUDDEN-ONSET SENSORINEURAL HEARING LOSS: Primary | ICD-10-CM

## 2023-07-17 DIAGNOSIS — R42 DIZZINESS: ICD-10-CM

## 2023-07-17 PROCEDURE — 97112 NEUROMUSCULAR REEDUCATION: CPT | Mod: GP | Performed by: PHYSICAL THERAPIST

## 2023-07-19 ENCOUNTER — TELEPHONE (OUTPATIENT)
Dept: OTOLARYNGOLOGY | Facility: CLINIC | Age: 78
End: 2023-07-19
Payer: COMMERCIAL

## 2023-07-19 DIAGNOSIS — Z79.4 TYPE 2 DIABETES MELLITUS WITH DIABETIC NEPHROPATHY, WITH LONG-TERM CURRENT USE OF INSULIN (H): ICD-10-CM

## 2023-07-19 DIAGNOSIS — E11.21 TYPE 2 DIABETES MELLITUS WITH DIABETIC NEPHROPATHY, WITH LONG-TERM CURRENT USE OF INSULIN (H): ICD-10-CM

## 2023-07-19 RX ORDER — LANCETS
EACH MISCELLANEOUS
Qty: 100 EACH | Refills: 0 | Status: SHIPPED | OUTPATIENT
Start: 2023-07-19 | End: 2024-02-19

## 2023-07-20 ENCOUNTER — TELEPHONE (OUTPATIENT)
Dept: INTERNAL MEDICINE | Facility: CLINIC | Age: 78
End: 2023-07-20
Payer: COMMERCIAL

## 2023-07-20 NOTE — TELEPHONE ENCOUNTER
Received message that patient has not read her Mychart message sent by her PCP below...        Will route to team to please contact the patient and assist in getting her scheduled for an appointment.     Elsie Baxter RN

## 2023-07-24 DIAGNOSIS — Z79.4 TYPE 2 DIABETES MELLITUS WITH DIABETIC NEPHROPATHY, WITH LONG-TERM CURRENT USE OF INSULIN (H): ICD-10-CM

## 2023-07-24 DIAGNOSIS — E11.21 TYPE 2 DIABETES MELLITUS WITH DIABETIC NEPHROPATHY, WITH LONG-TERM CURRENT USE OF INSULIN (H): ICD-10-CM

## 2023-07-24 RX ORDER — BLOOD SUGAR DIAGNOSTIC
STRIP MISCELLANEOUS
Qty: 100 STRIP | Refills: 0 | Status: SHIPPED | OUTPATIENT
Start: 2023-07-24 | End: 2023-11-02

## 2023-07-28 ENCOUNTER — LAB (OUTPATIENT)
Dept: LAB | Facility: CLINIC | Age: 78
End: 2023-07-28
Payer: COMMERCIAL

## 2023-07-28 DIAGNOSIS — E11.21 TYPE 2 DIABETES MELLITUS WITH DIABETIC NEPHROPATHY, WITH LONG-TERM CURRENT USE OF INSULIN (H): ICD-10-CM

## 2023-07-28 DIAGNOSIS — R80.9 PROTEINURIA, UNSPECIFIED TYPE: ICD-10-CM

## 2023-07-28 DIAGNOSIS — Z79.4 TYPE 2 DIABETES MELLITUS WITH DIABETIC NEPHROPATHY, WITH LONG-TERM CURRENT USE OF INSULIN (H): ICD-10-CM

## 2023-07-28 LAB
CREAT UR-MCNC: 27.2 MG/DL
HBA1C MFR BLD: 10 % (ref 0–5.6)
MICROALBUMIN UR-MCNC: <12 MG/L
MICROALBUMIN/CREAT UR: NORMAL MG/G{CREAT}

## 2023-07-28 PROCEDURE — 82043 UR ALBUMIN QUANTITATIVE: CPT

## 2023-07-28 PROCEDURE — 83036 HEMOGLOBIN GLYCOSYLATED A1C: CPT

## 2023-07-28 PROCEDURE — 82570 ASSAY OF URINE CREATININE: CPT

## 2023-07-28 PROCEDURE — 36415 COLL VENOUS BLD VENIPUNCTURE: CPT

## 2023-08-14 ENCOUNTER — TRANSFERRED RECORDS (OUTPATIENT)
Dept: HEALTH INFORMATION MANAGEMENT | Facility: CLINIC | Age: 78
End: 2023-08-14
Payer: COMMERCIAL

## 2023-08-29 DIAGNOSIS — E11.21 TYPE 2 DIABETES MELLITUS WITH DIABETIC NEPHROPATHY, WITH LONG-TERM CURRENT USE OF INSULIN (H): ICD-10-CM

## 2023-08-29 DIAGNOSIS — Z79.4 TYPE 2 DIABETES MELLITUS WITH DIABETIC NEPHROPATHY, WITH LONG-TERM CURRENT USE OF INSULIN (H): ICD-10-CM

## 2023-08-29 RX ORDER — INSULIN DETEMIR 100 [IU]/ML
INJECTION, SOLUTION SUBCUTANEOUS
Qty: 45 ML | Refills: 0 | Status: SHIPPED | OUTPATIENT
Start: 2023-08-29 | End: 2023-10-25

## 2023-09-25 ENCOUNTER — TRANSFERRED RECORDS (OUTPATIENT)
Dept: HEALTH INFORMATION MANAGEMENT | Facility: CLINIC | Age: 78
End: 2023-09-25
Payer: COMMERCIAL

## 2023-09-29 ENCOUNTER — OFFICE VISIT (OUTPATIENT)
Dept: AUDIOLOGY | Facility: CLINIC | Age: 78
End: 2023-09-29
Attending: OTOLARYNGOLOGY
Payer: COMMERCIAL

## 2023-09-29 DIAGNOSIS — H90.5 SENSORINEURAL HEARING LOSS OF RIGHT EAR: Primary | ICD-10-CM

## 2023-09-29 DIAGNOSIS — H90.A21 SENSORINEURAL HEARING LOSS (SNHL) OF RIGHT EAR WITH RESTRICTED HEARING OF LEFT EAR: ICD-10-CM

## 2023-09-29 DIAGNOSIS — H91.20 SUDDEN-ONSET SENSORINEURAL HEARING LOSS: ICD-10-CM

## 2023-09-29 PROCEDURE — 92590 PR HEARING AID EXAM MONAURAL: CPT | Performed by: AUDIOLOGIST

## 2023-09-29 NOTE — PROGRESS NOTES
AUDIOLOGY REPORT    SUBJECTIVE:   Essie Huddleston is a 78 year old female was seen in the Audiology Clinic at  [facility] on 9/29/23 to discuss concerns with hearing and functional communication difficulties. The patient was accompanied by their daughter. Essie has been seen previously on 7/5/23, and results revealed a normal hearing left ear and severe to profound sensorineural hearing loss.  The patient was medically evaluated and determined to be cleared for a CROS system by Rose Padilla. Essie notes difficulty with communication in a variety of listening situations.    OBJECTIVE:  Patient is a hearing aid candidate. Patient would like to move forward with a hearing aid evaluation today. Therefore, the patient was presented with different options for amplification to help aid in communication. Discussed styles, levels of technology and monaural vs. binaural fitting.     The hearing aid(s) mutually chosen were:  Left Oticon Real 2 with right PX transmitter  COLOR: Smiley brown  BATTERY SIZE: rechargeable  EARMOLD/TIPS: 8mm open  CANAL/ LENGTH: 2    ASSESSMENT:   Reviewed purchase information and warranty information with patient. The 45 day trial period was explained to patient. The patient was given a copy of the [place] Department of Health consumer brochure on purchasing hearing instruments. Patient risk factors have been provided to the patient in writing prior to the sale of the hearing aid per FDA regulation. The risk factors are also available in the User Instructional Booklet to be presented on the day of the hearing aid fitting. Hearing aid(s) ordered. Hearing aid evaluation completed.    PLAN:   Essie is scheduled to return in 2-3 weeks for a hearing aid fitting and programming. Purchase agreement will be completed on that date. Please contact this clinic with any questions or concerns.      Kusum Shukla, [facility]  Licensed Audiologist  [place]  License #[id number]

## 2023-09-30 DIAGNOSIS — G89.29 CHRONIC RIGHT-SIDED LOW BACK PAIN WITH RIGHT-SIDED SCIATICA: ICD-10-CM

## 2023-09-30 DIAGNOSIS — M54.41 CHRONIC RIGHT-SIDED LOW BACK PAIN WITH RIGHT-SIDED SCIATICA: ICD-10-CM

## 2023-10-01 RX ORDER — CELECOXIB 100 MG/1
CAPSULE ORAL
Qty: 180 CAPSULE | Refills: 1 | Status: SHIPPED | OUTPATIENT
Start: 2023-10-01 | End: 2024-01-14

## 2023-10-23 ENCOUNTER — OFFICE VISIT (OUTPATIENT)
Dept: INTERNAL MEDICINE | Facility: CLINIC | Age: 78
End: 2023-10-23
Payer: COMMERCIAL

## 2023-10-23 VITALS
BODY MASS INDEX: 35.57 KG/M2 | HEIGHT: 62 IN | SYSTOLIC BLOOD PRESSURE: 129 MMHG | DIASTOLIC BLOOD PRESSURE: 75 MMHG | WEIGHT: 193.3 LBS | TEMPERATURE: 97 F | HEART RATE: 65 BPM | OXYGEN SATURATION: 97 %

## 2023-10-23 DIAGNOSIS — Z79.4 TYPE 2 DIABETES MELLITUS WITH DIABETIC NEPHROPATHY, WITH LONG-TERM CURRENT USE OF INSULIN (H): Primary | ICD-10-CM

## 2023-10-23 DIAGNOSIS — Z23 FLU VACCINE NEED: ICD-10-CM

## 2023-10-23 DIAGNOSIS — E66.01 SEVERE OBESITY WITH BODY MASS INDEX (BMI) OF 35.0 TO 39.9 WITH COMORBIDITY (H): ICD-10-CM

## 2023-10-23 DIAGNOSIS — F11.90 CHRONIC, CONTINUOUS USE OF OPIOIDS: ICD-10-CM

## 2023-10-23 DIAGNOSIS — Z23 NEED FOR COVID-19 VACCINE: ICD-10-CM

## 2023-10-23 DIAGNOSIS — E78.5 HYPERLIPIDEMIA LDL GOAL <100: ICD-10-CM

## 2023-10-23 DIAGNOSIS — E11.21 TYPE 2 DIABETES MELLITUS WITH DIABETIC NEPHROPATHY, WITH LONG-TERM CURRENT USE OF INSULIN (H): Primary | ICD-10-CM

## 2023-10-23 PROCEDURE — 90662 IIV NO PRSV INCREASED AG IM: CPT | Performed by: INTERNAL MEDICINE

## 2023-10-23 PROCEDURE — 90480 ADMN SARSCOV2 VAC 1/ONLY CMP: CPT | Performed by: INTERNAL MEDICINE

## 2023-10-23 PROCEDURE — 99214 OFFICE O/P EST MOD 30 MIN: CPT | Mod: 25 | Performed by: INTERNAL MEDICINE

## 2023-10-23 PROCEDURE — 90471 IMMUNIZATION ADMIN: CPT | Performed by: INTERNAL MEDICINE

## 2023-10-23 PROCEDURE — 91320 SARSCV2 VAC 30MCG TRS-SUC IM: CPT | Performed by: INTERNAL MEDICINE

## 2023-10-23 NOTE — PROGRESS NOTES
"  ASSESSMENT:    1. Type 2 diabetes mellitus with diabetic nephropathy, with long-term current use of insulin (H)  Needs improved control.  We will start Jardiance.  Reduce Levemir intravasation to 32 units.  Recheck diabetic labs as ordered below in  3 months  - Hemoglobin A1c; Future  - Albumin Random Urine Quantitative with Creat Ratio; Future  - Comprehensive metabolic panel; Future  - empagliflozin (JARDIANCE) 10 MG TABS tablet; Take 1 tablet (10 mg) by mouth daily  Dispense: 30 tablet; Refill: 11  - Basic metabolic panel; Future    2. Hyperlipidemia LDL goal <100  Previously controlled except for mild triglyceride elevation.  Continue simvastatin.  Repeat lab 6 months  - Lipid panel reflex to direct LDL Fasting; Future  - Comprehensive metabolic panel; Future    3. Chronic, continuous use of opioids  On chronic tramadol for pain control.  Due for next refill 11/10/2023. CSA  and UDS UTD      4. Severe obesity with body mass index (BMI) of 35.0 to 39.9 with comorbidity (H)  Weight unchanged recently.  Counseled guarding calorie/carbohydrate duction.  Addition of Jardiance as above with reduction in insulin dosing will hopefully help some.  Possible future use of GLP-1 agonist if weight not improving  in future as Jardiance dose titrated up further to future 25 mg daily dose    5. Flu vaccine need  - INFLUENZA VACCINE 65+ (FLUZONE HD)    6. Need for COVID-19 vaccine  - COVID-19 12+ (2023-24) (PFIZER)         PLAN:   Future refill Tramadol on 11/10/23   Jardiance 10mg tab, 1 tab daily for diabetes  Call our appointment desk at [phone] or use [employer] to schedule a \"lab only\" to have your Basic Metabolic Panel checked non-fasting in 3 weeks and your A1C, Comprehensive Metabolic Panel, Lipid profile (Cholesterol Panel), and Urine test for microalbumin checked fasting in 3 months.   Reduce Levemir insulin to 32 units daily  Vaccinations: Influenza vaccine and Pfizer covid vaccine  Reduce calorie/carbohydrate " (sugar, bread, potato, pasta, rice, alcohol etc)  intake in diet.  Increase color on your plate with vegetables. Increase  frequency of walking or other aerobic exercise as able with your back issues (goal is daily)        (Chart documentation was completed, in part, with [employer] voice-recognition software. Even though reviewed, some grammatical, spelling, and word errors may remain.)    Luigi Driscoll MD  Internal Medicine Department  [facility] JAUN Fountain is a 78 year old, presenting for the following health issues:  Pain Management, Diabetes, and Hypertension      History of Present Illness       Diabetes:   She presents for follow up of diabetes.  She is checking home blood glucose two times daily.   She checks blood glucose before meals and at bedtime.  Blood glucose is sometimes over 200 and never under 70. She is aware of hypoglycemia symptoms including shakiness, dizziness, weakness and other.   She is concerned about blood sugar frequently over 200 and other.   She is having numbness in feet and burning in feet.  The patient has had a diabetic eye exam in the last 12 months. Eye exam performed on November 2022. Location of last eye exam [facility].        Reason for visit:  Pain Management Appointment    She eats 2-3 servings of fruits and vegetables daily.She consumes 1 sweetened beverage(s) daily.She exercises with enough effort to increase her heart rate 9 or less minutes per day.  She exercises with enough effort to increase her heart rate 3 or less days per week.   She is taking medications regularly.             BP Readings from Last 2 Encounters:   06/23/23 (!) 149/80   06/20/23 132/74     Hemoglobin A1C (%)   Date Value   07/28/2023 10.0 (H)   03/16/2023 8.1 (H)   06/02/2021 7.5 (H)   01/26/2021 7.3 (H)     LDL Cholesterol Calculated (mg/dL)   Date Value   03/16/2023 58   03/03/2022 43   06/02/2021 50   07/23/2020 31             Hypertension  Follow-up    Do you check your blood pressure regularly outside of the clinic? No   Are you following a low salt diet? Yes  Are your blood pressures ever more than 140 on the top number (systolic) OR more   than 90 on the bottom number (diastolic), for example 140/90? Yes    Most recent lab results reviewed with pt.      Component      Latest Ref Rng 3/16/2023  11:35 AM 3/16/2023  11:40 AM 5/13/2023  2:49 PM 7/28/2023  9:09 AM   WBC      4.0 - 11.0 10e3/uL   3.9 (L)     RBC Count      3.80 - 5.20 10e6/uL   4.03     Hemoglobin      11.7 - 15.7 g/dL   12.0     Hematocrit      35.0 - 47.0 %   38.0     MCV      78 - 100 fL   94     MCH      26.5 - 33.0 pg   29.8     MCHC      31.5 - 36.5 g/dL   31.6     RDW      10.0 - 15.0 %   13.3     Platelet Count      150 - 450 10e3/uL   245     % Neutrophils      %   34     % Lymphocytes      %   51     % Monocytes      %   9     % Eosinophils      %   5     % Basophils      %   1     % Immature Granulocytes      %   0     NRBCs per 100 WBC      <1 /100   0     Absolute Neutrophils      1.6 - 8.3 10e3/uL   1.3 (L)     Absolute Lymphocytes      0.8 - 5.3 10e3/uL   2.0     Absolute Monocytes      0.0 - 1.3 10e3/uL   0.3     Absolute Eosinophils      0.0 - 0.7 10e3/uL   0.2     Absolute Basophils      0.0 - 0.2 10e3/uL   0.0     Absolute Immature Granulocytes      <=0.4 10e3/uL   0.0     Absolute NRBCs      10e3/uL   0.0     Sodium      136 - 145 mmol/L 142       Potassium      3.4 - 5.3 mmol/L 4.6       Chloride      98 - 107 mmol/L 103       Carbon Dioxide (CO2)      22 - 29 mmol/L 27       Anion Gap      7 - 15 mmol/L 12       Urea Nitrogen      8.0 - 23.0 mg/dL 15.3       Creatinine      0.51 - 0.95 mg/dL 0.81       Calcium      8.8 - 10.2 mg/dL 9.7       Glucose      70 - 99 mg/dL 184 (H)       Alkaline Phosphatase      35 - 104 U/L 73       AST      10 - 35 U/L 19       ALT      10 - 35 U/L 17       Protein Total      6.4 - 8.3 g/dL 7.5       Albumin      3.5 - 5.2 g/dL 4.3        Bilirubin Total      <=1.2 mg/dL 0.3       GFR Estimate      >60 mL/min/1.73m2 74       Cholesterol      <200 mg/dL 160       Triglycerides      <150 mg/dL 179 (H)       HDL Cholesterol      >=50 mg/dL 66       LDL Cholesterol Calculated      <=100 mg/dL 58       Non HDL Cholesterol      <130 mg/dL 94       Creatinine Urine      mg/dL  42.2      Albumin Urine mg/L      mg/L  19.2      Albumin Urine mg/g Cr  45.50 (H)      Hemoglobin A1C      0.0 - 5.6 % 8.1 (H)    10.0 (H)      Component      Latest Ref [facility] 7/28/2023  9:37 AM   WBC      4.0 - 11.0 10e3/uL    RBC Count      3.80 - 5.20 10e6/uL    Hemoglobin      11.7 - 15.7 g/dL    Hematocrit      35.0 - 47.0 %    MCV      78 - 100 fL    MCH      26.5 - 33.0 pg    MCHC      31.5 - 36.5 g/dL    RDW      10.0 - 15.0 %    Platelet Count      150 - 450 10e3/uL    % Neutrophils      %    % Lymphocytes      %    % Monocytes      %    % Eosinophils      %    % Basophils      %    % Immature Granulocytes      %    NRBCs per 100 WBC      <1 /100    Absolute Neutrophils      1.6 - 8.3 10e3/uL    Absolute Lymphocytes      0.8 - 5.3 10e3/uL    Absolute Monocytes      0.0 - 1.3 10e3/uL    Absolute Eosinophils      0.0 - 0.7 10e3/uL    Absolute Basophils      0.0 - 0.2 10e3/uL    Absolute Immature Granulocytes      <=0.4 10e3/uL    Absolute NRBCs      10e3/uL    Sodium      136 - 145 mmol/L    Potassium      3.4 - 5.3 mmol/L    Chloride      98 - 107 mmol/L    Carbon Dioxide (CO2)      22 - 29 mmol/L    Anion Gap      7 - 15 mmol/L    Urea Nitrogen      8.0 - 23.0 mg/dL    Creatinine      0.51 - 0.95 mg/dL    Calcium      8.8 - 10.2 mg/dL    Glucose      70 - 99 mg/dL    Alkaline Phosphatase      35 - 104 U/L    AST      10 - 35 U/L    ALT      10 - 35 U/L    Protein Total      6.4 - 8.3 g/dL    Albumin      3.5 - 5.2 g/dL    Bilirubin Total      <=1.2 mg/dL    GFR Estimate      >60 mL/min/1.73m2    Cholesterol      <200 mg/dL    Triglycerides      <150 mg/dL    HDL  "Cholesterol      >=50 mg/dL    LDL Cholesterol Calculated      <=100 mg/dL    Non HDL Cholesterol      <130 mg/dL    Creatinine Urine      mg/dL 27.2    Albumin Urine mg/L      mg/L <12.0    Albumin Urine mg/g Cr --    Hemoglobin A1C      0.0 - 5.6 %       Denies CP, SOB, abdominal pain, polyuria, polydipsia, vision changes, extremity numbness/parasthesias or skin problems.  On tramadol for chronic back pain with some radiculopathy to the lower extremities.  No bowel or bladder incontinence..  Has undergone previous cortisone injections with the last done in September in the low back.  Patient working with   "[employer] [place]"   Recent blood sugars:   AM 86, 118, 129  , 232, 269       Additional ROS:   Constitutional, HEENT, Cardiovascular, Pulmonary, GI and , Neuro, MSK and Psych review of systems/symptoms are otherwise negative or unchanged from previous, except as noted above.      OBJECTIVE:  /75   Pulse 65   Temp 97  F (36.1  C) (Temporal)   Ht 1.575 m (5' 2\")   Wt 87.7 kg (193 lb 4.8 oz)   SpO2 97%   BMI 35.36 kg/m     Estimated body mass index is 35.36 kg/m  as calculated from the following:    Height as of this encounter: 1.575 m (5' 2\").    Weight as of this encounter: 87.7 kg (193 lb 4.8 oz).     Pulm: Lungs clear to auscultation   CV: Regular rates and rhythm  GI: Soft, nontender, obese, Normal active bowel sounds, No hepatosplenomegaly or masses palpable  Ext: Peripheral pulses intact. No edema.  Neuro: Normal strength and tone, sensory exam grossly normal  Back: Tenderness to palpation bilateral paralumbar musculature.  Negative straight leg raise test bilaterally             "

## 2023-10-25 ENCOUNTER — TELEPHONE (OUTPATIENT)
Dept: INTERNAL MEDICINE | Facility: CLINIC | Age: 78
End: 2023-10-25
Payer: COMMERCIAL

## 2023-10-25 DIAGNOSIS — E11.21 TYPE 2 DIABETES MELLITUS WITH DIABETIC NEPHROPATHY, WITH LONG-TERM CURRENT USE OF INSULIN (H): ICD-10-CM

## 2023-10-25 DIAGNOSIS — Z79.4 TYPE 2 DIABETES MELLITUS WITH DIABETIC NEPHROPATHY, WITH LONG-TERM CURRENT USE OF INSULIN (H): ICD-10-CM

## 2023-10-25 RX ORDER — INSULIN DETEMIR 100 [IU]/ML
INJECTION, SOLUTION SUBCUTANEOUS
Start: 2023-10-25 | End: 2023-11-22

## 2023-10-25 NOTE — TELEPHONE ENCOUNTER
Received a call from the patient and the patient's Daughter, Candis, stating they were able to  the Jardiance from the pharmacy and the medication is covered by insurance.     Daughter and patient are looking for guidance on what she should do with her other diabetic medications? Decrease dose on any medications? Discontinue any medications? Daughter states the patient will not start taking the Jardiance until she knows what to do with her other medications.     Will route to PCP for review.     Please call AND send a [employer] message!  Can we leave a detailed message on this number? YES  Phone number patient can be reached at: Cell number on file:    Telephone Information:   Mobile [phone]       Elsie Baxter RN  [facility] Triage

## 2023-10-25 NOTE — TELEPHONE ENCOUNTER
Reduce Levemir to 32 units at bedtime daily and start Jardiance 10mg dose once a day in AM.  I would like pt to have an extra glass of water/day and repeat a nonfasting BMP lab in 3 weeks to recheck renal function with Jardiance. Starting 4 days prior to the lab test, also check a blood sugar in AM before breakfast and before bedtime for the 4 days and record the results and send me the results in a Mychart note. Based on the lab result and blood sugars, will then determine if OK to increase Jardiance further to a standard 25mg daily dose and make further med adjustments as appropriate. If any low blood sugar episodes (<70)  in the next 3 weeks despite the initial lower Levemr dose, then pt to let me know

## 2023-10-25 NOTE — TELEPHONE ENCOUNTER
Called and spoke to the patient and the patient's Daughter, Candis. Relayed message from the provider as Candis was taking notes for the patient. Assisted in getting the patient scheduled for lab appointment in 3 weeks.     Appointments in Next Year      Nov 16, 2023  9:00 AM  [facility]  [facility] [place] [facility] ([facility] ) [phone]     Candis expressed understanding at this time and had no additional questions.     Elsie Baxter RN

## 2023-11-02 DIAGNOSIS — G89.29 CHRONIC RIGHT-SIDED LOW BACK PAIN WITH RIGHT-SIDED SCIATICA: ICD-10-CM

## 2023-11-02 DIAGNOSIS — E11.21 TYPE 2 DIABETES MELLITUS WITH DIABETIC NEPHROPATHY, WITH LONG-TERM CURRENT USE OF INSULIN (H): ICD-10-CM

## 2023-11-02 DIAGNOSIS — M54.41 CHRONIC RIGHT-SIDED LOW BACK PAIN WITH RIGHT-SIDED SCIATICA: ICD-10-CM

## 2023-11-02 DIAGNOSIS — Z79.4 TYPE 2 DIABETES MELLITUS WITH DIABETIC NEPHROPATHY, WITH LONG-TERM CURRENT USE OF INSULIN (H): ICD-10-CM

## 2023-11-02 DIAGNOSIS — F11.90 CHRONIC, CONTINUOUS USE OF OPIOIDS: ICD-10-CM

## 2023-11-02 RX ORDER — TRAMADOL HYDROCHLORIDE 50 MG/1
50 TABLET ORAL 2 TIMES DAILY
Qty: 60 TABLET | Refills: 5 | Status: SHIPPED | OUTPATIENT
Start: 2023-11-02 | End: 2024-05-08

## 2023-11-02 RX ORDER — BLOOD SUGAR DIAGNOSTIC
STRIP MISCELLANEOUS
Qty: 200 STRIP | Refills: 4 | Status: SHIPPED | OUTPATIENT
Start: 2023-11-02 | End: 2023-12-04

## 2023-11-09 ENCOUNTER — TELEPHONE (OUTPATIENT)
Dept: AUDIOLOGY | Facility: CLINIC | Age: 78
End: 2023-11-09
Payer: COMMERCIAL

## 2023-11-09 NOTE — TELEPHONE ENCOUNTER
LVM that Annmarie is no longer available on 11/17 and we need to reschedule. We changed the IRP to the HFP. Patient needs to reschedule IRP 3 weeks after HFP. Annmarie will also write a note stating the trail period has been extended. Gave call center number

## 2023-11-16 ENCOUNTER — LAB (OUTPATIENT)
Dept: LAB | Facility: CLINIC | Age: 78
End: 2023-11-16
Payer: COMMERCIAL

## 2023-11-16 DIAGNOSIS — E11.21 TYPE 2 DIABETES MELLITUS WITH DIABETIC NEPHROPATHY, WITH LONG-TERM CURRENT USE OF INSULIN (H): ICD-10-CM

## 2023-11-16 DIAGNOSIS — Z79.4 TYPE 2 DIABETES MELLITUS WITH DIABETIC NEPHROPATHY, WITH LONG-TERM CURRENT USE OF INSULIN (H): ICD-10-CM

## 2023-11-16 LAB
ANION GAP SERPL CALCULATED.3IONS-SCNC: 11 MMOL/L (ref 7–15)
BUN SERPL-MCNC: 18.1 MG/DL (ref 8–23)
CALCIUM SERPL-MCNC: 9.7 MG/DL (ref 8.8–10.2)
CHLORIDE SERPL-SCNC: 103 MMOL/L (ref 98–107)
CREAT SERPL-MCNC: 0.7 MG/DL (ref 0.51–0.95)
DEPRECATED HCO3 PLAS-SCNC: 27 MMOL/L (ref 22–29)
EGFRCR SERPLBLD CKD-EPI 2021: 89 ML/MIN/1.73M2
GLUCOSE SERPL-MCNC: 180 MG/DL (ref 70–99)
POTASSIUM SERPL-SCNC: 4.6 MMOL/L (ref 3.4–5.3)
SODIUM SERPL-SCNC: 141 MMOL/L (ref 135–145)

## 2023-11-16 PROCEDURE — 36415 COLL VENOUS BLD VENIPUNCTURE: CPT

## 2023-11-16 PROCEDURE — 80048 BASIC METABOLIC PNL TOTAL CA: CPT

## 2023-11-20 ENCOUNTER — OFFICE VISIT (OUTPATIENT)
Dept: AUDIOLOGY | Facility: CLINIC | Age: 78
End: 2023-11-20
Payer: COMMERCIAL

## 2023-11-20 DIAGNOSIS — H90.3 SENSORINEURAL HEARING LOSS, ASYMMETRICAL: Primary | ICD-10-CM

## 2023-11-20 PROCEDURE — V5020 CONFORMITY EVALUATION: HCPCS | Performed by: AUDIOLOGIST

## 2023-11-20 PROCEDURE — V5200 DISP FEE CONTRALATERAL MONAU: HCPCS | Mod: LT | Performed by: AUDIOLOGIST

## 2023-11-20 PROCEDURE — V5181 HEARING AID MONAURAL BTE: HCPCS | Mod: NU | Performed by: AUDIOLOGIST

## 2023-11-20 NOTE — PROGRESS NOTES
AUDIOLOGY REPORT    SUBJECTIVE: Essie Huddleston is a 78 year old female who was seen in the Audiology Clinic at the [facility] and Surgery [facility] for a fitting of a left Oticon Real 2 RITE hearing aid and right Oticon CROS PX transmitter. Previous results have revealed a essentially normal hearing in the left ear and a severe to profound sensorineural hearing loss with poor word recognition in the right ear. The patient was given medical clearance to pursue amplification by  Rose Padilla MD.. She was accompanied to today's appointment by her daughter.  OBJECTIVE: The hearing aid conformity evaluation was completed.The hearing aids were placed and they provided a good fit. Due to technical issues, simulated real-ear measurements were completed on the [employer] system and were a good match to NAL-NL1 target with soft sounds audible, moderate sounds comfortable, and loud sounds below discomfort. UCLs are verified through maximum power output measures and demonstrate appropriate limiting of loud inputs. Essie was oriented to proper hearing aid use, care, cleaning (no water, dry brush), batteries (size: BATTERY SIZE: rechargeable, insertion/removal, toxicity, low-battery signal), aid insertion/removal, user booklet, warranty information, storage cases, and other hearing aid details. The patient confirmed understanding of hearing aid use and care, and showed proper insertion of hearing aid and batteries while in the office today.Essie reported good volume and sound quality today.   Hearing aids were programmed as follows:  Program 1:Automatic  Toggle is active for on/off and volume on CROS side  EAR(S) FIT: Bilateral  HEARING AID MODEL NAME: Left Oticon Real 2 and Right Oticon CROS PX transmitter  HEARING AID STYLE: -in-the-ear behind-the-ear  EARMOLDS/TIP: 8mm open  SERIAL NUMBERS: Right: [id number] Left: [id number]  WARRANTY END DATE: 10/29/2026  In an effort to keep things simple, and due to  time constrains, the hearing devices were not connected to a phone or phone sathya.  Realistic expectations were reviewed regarding sound localization and hearing in complex environments.  ASSESSMENT: Bilateral hearing devices were fit today. Verification measures were performed. Essie signed the Hearing Aid Purchase Agreement and was given a copy, as well as details on her hearing aids. Patient was counseled that exact out of pocket amounts cannot be determined for hearing aid claims being sent to insurance. Any insurance coverage information presented to the patient is an estimate only, and is not a guarantee of payment. Patient has been advised to check with their own insurance.    PLAN:Essie will return for follow-up in 2-3 weeks for a hearing aid review appointment. Hearing aids billed according to [place] guidelines.  Please call this clinic with questions regarding today s appointment.    Bola Mccann  Audiologist  [place] License  #[id number]

## 2023-11-21 DIAGNOSIS — Z79.4 TYPE 2 DIABETES MELLITUS WITH DIABETIC NEPHROPATHY, WITH LONG-TERM CURRENT USE OF INSULIN (H): ICD-10-CM

## 2023-11-21 DIAGNOSIS — E11.21 TYPE 2 DIABETES MELLITUS WITH DIABETIC NEPHROPATHY, WITH LONG-TERM CURRENT USE OF INSULIN (H): ICD-10-CM

## 2023-11-22 RX ORDER — INSULIN DETEMIR 100 [IU]/ML
INJECTION, SOLUTION SUBCUTANEOUS
Qty: 45 ML | Refills: 3 | Status: SHIPPED | OUTPATIENT
Start: 2023-11-22 | End: 2024-08-13

## 2023-11-24 NOTE — PATIENT INSTRUCTIONS
" Future refill Tramadol on 11/10/23   Jardiance 10mg tab, 1 tab daily for diabetes  Call our appointment desk at [phone] or use [employer] to schedule a \"lab only\" to have your Basic Metabolic Panel checked non-fasting in 3 weeks and your A1C, Comprehensive Metabolic Panel, Lipid profile (Cholesterol Panel), and Urine test for microalbumin checked fasting in 3 months.   Reduce Levemir insulin to 32 units daily  Vaccinations: Influenza vaccine and Pfizer covid vaccine    "

## 2023-11-29 ENCOUNTER — HOSPITAL ENCOUNTER (OUTPATIENT)
Dept: MAMMOGRAPHY | Facility: CLINIC | Age: 78
Discharge: HOME | End: 2023-11-29
Attending: INTERNAL MEDICINE | Admitting: INTERNAL MEDICINE
Payer: COMMERCIAL

## 2023-11-29 DIAGNOSIS — Z12.31 VISIT FOR SCREENING MAMMOGRAM: ICD-10-CM

## 2023-11-29 PROCEDURE — 77067 SCR MAMMO BI INCL CAD: CPT

## 2023-12-04 DIAGNOSIS — E11.21 TYPE 2 DIABETES MELLITUS WITH DIABETIC NEPHROPATHY, WITH LONG-TERM CURRENT USE OF INSULIN (H): ICD-10-CM

## 2023-12-04 DIAGNOSIS — Z79.4 TYPE 2 DIABETES MELLITUS WITH DIABETIC NEPHROPATHY, WITH LONG-TERM CURRENT USE OF INSULIN (H): ICD-10-CM

## 2023-12-04 RX ORDER — BLOOD SUGAR DIAGNOSTIC
STRIP MISCELLANEOUS
Qty: 200 STRIP | Refills: 1 | Status: SHIPPED | OUTPATIENT
Start: 2023-12-04 | End: 2024-04-24

## 2023-12-13 ENCOUNTER — OFFICE VISIT (OUTPATIENT)
Dept: AUDIOLOGY | Facility: CLINIC | Age: 78
End: 2023-12-13
Payer: COMMERCIAL

## 2023-12-13 DIAGNOSIS — H90.41 SENSORINEURAL HEARING LOSS (SNHL) OF RIGHT EAR WITH UNRESTRICTED HEARING OF LEFT EAR: Primary | ICD-10-CM

## 2023-12-13 PROCEDURE — 99207 PR ASSESSMENT FOR HEARING AID: CPT | Performed by: AUDIOLOGIST

## 2023-12-13 NOTE — PROGRESS NOTES
AUDIOLOGY REPORT    SUBJECTIVE:  Essie Huddleston is a 78 year old female who was seen in the Audiology Clinic at the [facility] on 12/13/2023  for a follow-up check regarding the fitting of new hearing aids. Previous results have revealed an essentially normal hearing left ear and severe to profound sensorineural hearing loss with poor word recognition in the right ear.  The patient has been seen previously in this clinic and was fit with Oticon Real 2 RITE and CROS Px transmitter on 11/20/23.  Essie reports that she doesn't know if the CROS system is helping her and she is still struggling in situations with background noise. She had questions about the retention tail and if she needed it on her hearing aid.    OBJECTIVE:   Visual inspection of the hearing aids revealed that Essie had them in the wrong ears and backwards on her ear. We discussed that the CROS transmitter does not produce any sound so if that is the way she has been wearing it she would not be noticing any benefit. The hearing aids were also off. We discussed that the hearing aids turn on automatically when she takes them off the  so she does not need to use the push button. We also discussed realistic expectations at length and that she is a one eared listener, therefore complex listening environments will still be difficult with the CROS system. In situations with lots of noise she may want to take the CROS transmitter or both off so she is not getting extra noise from the CROS side. We reviewed visual indicators for left vs right.    Reviewed 45 day trial period, care, cleaning (no water, dry brush) aid insertion/removal, volume adjustment (if applicable), user booklet, warranty information, storage cases, and other hearing aid details.     No charge visit today (in warranty hearing aid check).    ASSESSMENT:   A follow-up appointment for hearing aid fitting was completed today. Changes to  hearing aid was completed as outlined above.     PLAN:  Essie will return for follow-up as needed, in 2-3 weeks for another appointemnt to check on progress.. Please call this clinic with any questions regarding today s appointment.    Kusum Shukla, [facility]  Licensed Audiologist  [place] License #[id number]

## 2024-01-04 ENCOUNTER — OFFICE VISIT (OUTPATIENT)
Dept: AUDIOLOGY | Facility: CLINIC | Age: 79
End: 2024-01-04
Payer: COMMERCIAL

## 2024-01-04 DIAGNOSIS — H90.41 SENSORINEURAL HEARING LOSS (SNHL) OF RIGHT EAR WITH UNRESTRICTED HEARING OF LEFT EAR: Primary | ICD-10-CM

## 2024-01-04 PROCEDURE — 99207 PR ASSESSMENT FOR HEARING AID: CPT | Performed by: AUDIOLOGIST

## 2024-01-04 NOTE — PROGRESS NOTES
AUDIOLOGY REPORT    SUBJECTIVE:  Essie Huddleston is a 78 year old female who was seen in the Audiology Clinic at the [facility] and Surgery [facility] on 1/4/24 for a follow-up check regarding the fitting of new hearing aids. Previous results have revealed an essentially normal hearing left ear and severe to profound sensorineural hearing loss with poor word recognition in the right ear.  The patient has been seen previously in this clinic and was fit with Oticon Real 2 RITE and CROS Px transmitter on 11/20/23.  Essie reports that she is liking the hearing aids, however has some discomfort in the right ear canal when wearing the hearing aids for extended periods of time.    OBJECTIVE:   A 6 mm open dome was put on the right hearing aid which she reported felt better. We discussed that the ear canals are very sensitive so it may take time to adjust to the feeling of the hearing aid in the ear and she reported understanding.    Reviewed 45 day trial period, care, cleaning (no water, dry brush) aid insertion/removal, volume adjustment (if applicable), user booklet, warranty information, storage cases, and other hearing aid details.     No charge visit today (in warranty hearing aid check).    ASSESSMENT:   A follow-up appointment for hearing aid fitting was completed today. Changes to hearing aid was completed as outlined above.     PLAN:  Essie will return for follow-up as needed, or at least every 9-12 months for cleaning and assessment of hearing aid.  . Please call this clinic with any questions regarding today s appointment.      Kusum Shukla, [facility]  Licensed Audiologist  [place] License #[id number]

## 2024-01-12 DIAGNOSIS — E78.5 HYPERLIPIDEMIA LDL GOAL <100: ICD-10-CM

## 2024-01-12 DIAGNOSIS — M54.41 CHRONIC RIGHT-SIDED LOW BACK PAIN WITH RIGHT-SIDED SCIATICA: ICD-10-CM

## 2024-01-12 DIAGNOSIS — G89.29 CHRONIC RIGHT-SIDED LOW BACK PAIN WITH RIGHT-SIDED SCIATICA: ICD-10-CM

## 2024-01-14 RX ORDER — CELECOXIB 100 MG/1
CAPSULE ORAL
Qty: 180 CAPSULE | Refills: 3 | Status: SHIPPED | OUTPATIENT
Start: 2024-01-14

## 2024-01-14 RX ORDER — SIMVASTATIN 20 MG
TABLET ORAL
Qty: 90 TABLET | Refills: 3 | Status: SHIPPED | OUTPATIENT
Start: 2024-01-14

## 2024-01-14 NOTE — TELEPHONE ENCOUNTER
Guido fasting lab appointment scheduled for February.  Previous lipids at goal.  Previous GFR normal

## 2024-01-23 ENCOUNTER — LAB (OUTPATIENT)
Dept: LAB | Facility: CLINIC | Age: 79
End: 2024-01-23
Payer: COMMERCIAL

## 2024-01-23 DIAGNOSIS — E78.5 HYPERLIPIDEMIA LDL GOAL <100: ICD-10-CM

## 2024-01-23 DIAGNOSIS — E11.21 TYPE 2 DIABETES MELLITUS WITH DIABETIC NEPHROPATHY, WITH LONG-TERM CURRENT USE OF INSULIN (H): ICD-10-CM

## 2024-01-23 DIAGNOSIS — Z79.4 TYPE 2 DIABETES MELLITUS WITH DIABETIC NEPHROPATHY, WITH LONG-TERM CURRENT USE OF INSULIN (H): ICD-10-CM

## 2024-01-23 LAB
ALBUMIN SERPL BCG-MCNC: 4.3 G/DL (ref 3.5–5.2)
ALP SERPL-CCNC: 74 U/L (ref 40–150)
ALT SERPL W P-5'-P-CCNC: 13 U/L (ref 0–50)
ANION GAP SERPL CALCULATED.3IONS-SCNC: 8 MMOL/L (ref 7–15)
AST SERPL W P-5'-P-CCNC: 17 U/L (ref 0–45)
BILIRUB SERPL-MCNC: 0.5 MG/DL
BUN SERPL-MCNC: 14.7 MG/DL (ref 8–23)
CALCIUM SERPL-MCNC: 9.3 MG/DL (ref 8.8–10.2)
CHLORIDE SERPL-SCNC: 101 MMOL/L (ref 98–107)
CHOLEST SERPL-MCNC: 138 MG/DL
CREAT SERPL-MCNC: 0.68 MG/DL (ref 0.51–0.95)
CREAT UR-MCNC: 27.3 MG/DL
DEPRECATED HCO3 PLAS-SCNC: 29 MMOL/L (ref 22–29)
EGFRCR SERPLBLD CKD-EPI 2021: 89 ML/MIN/1.73M2
FASTING STATUS PATIENT QL REPORTED: YES
GLUCOSE SERPL-MCNC: 117 MG/DL (ref 70–99)
HBA1C MFR BLD: 7.5 % (ref 0–5.6)
HDLC SERPL-MCNC: 59 MG/DL
LDLC SERPL CALC-MCNC: 55 MG/DL
MICROALBUMIN UR-MCNC: <12 MG/L
MICROALBUMIN/CREAT UR: NORMAL MG/G{CREAT}
NONHDLC SERPL-MCNC: 79 MG/DL
POTASSIUM SERPL-SCNC: 4.9 MMOL/L (ref 3.4–5.3)
PROT SERPL-MCNC: 7.2 G/DL (ref 6.4–8.3)
SODIUM SERPL-SCNC: 138 MMOL/L (ref 135–145)
TRIGL SERPL-MCNC: 121 MG/DL

## 2024-01-23 PROCEDURE — 80061 LIPID PANEL: CPT

## 2024-01-23 PROCEDURE — 82043 UR ALBUMIN QUANTITATIVE: CPT

## 2024-01-23 PROCEDURE — 83036 HEMOGLOBIN GLYCOSYLATED A1C: CPT

## 2024-01-23 PROCEDURE — 82570 ASSAY OF URINE CREATININE: CPT

## 2024-01-23 PROCEDURE — 80053 COMPREHEN METABOLIC PANEL: CPT

## 2024-01-23 PROCEDURE — 36415 COLL VENOUS BLD VENIPUNCTURE: CPT

## 2024-01-24 ENCOUNTER — TELEPHONE (OUTPATIENT)
Dept: INTERNAL MEDICINE | Facility: CLINIC | Age: 79
End: 2024-01-24
Payer: COMMERCIAL

## 2024-01-24 NOTE — TELEPHONE ENCOUNTER
Patient's daughter Candis calling (on C2C) seeking PCP recommendations on what labs patient will need to have repeated on 2/2/2024. Candis stated patient began taking Jardiance.     Routing to PCP for review. Will patient need repeat las drawn prior to appt with her 2/5/2024? Thank you.

## 2024-01-30 NOTE — TELEPHONE ENCOUNTER
Pt just had labs drawn 1/23/24 and does not need additional labs drawn 2/2/24. MD spoke with pt';s daughter Candis and made her aware lab appt is not needed and to just see me 2.5.24 as scheduled. Please cancel pt's lab appt 2/2/24 and then close encounter

## 2024-02-05 ENCOUNTER — OFFICE VISIT (OUTPATIENT)
Dept: INTERNAL MEDICINE | Facility: CLINIC | Age: 79
End: 2024-02-05
Payer: COMMERCIAL

## 2024-02-05 VITALS
DIASTOLIC BLOOD PRESSURE: 72 MMHG | WEIGHT: 189.9 LBS | OXYGEN SATURATION: 95 % | BODY MASS INDEX: 34.95 KG/M2 | SYSTOLIC BLOOD PRESSURE: 130 MMHG | HEART RATE: 64 BPM | RESPIRATION RATE: 16 BRPM | HEIGHT: 62 IN

## 2024-02-05 DIAGNOSIS — F11.90 CHRONIC, CONTINUOUS USE OF OPIOIDS: ICD-10-CM

## 2024-02-05 DIAGNOSIS — Z79.4 TYPE 2 DIABETES MELLITUS WITH DIABETIC NEPHROPATHY, WITH LONG-TERM CURRENT USE OF INSULIN (H): Primary | ICD-10-CM

## 2024-02-05 DIAGNOSIS — R19.5 LOOSE STOOLS: ICD-10-CM

## 2024-02-05 DIAGNOSIS — E11.21 TYPE 2 DIABETES MELLITUS WITH DIABETIC NEPHROPATHY, WITH LONG-TERM CURRENT USE OF INSULIN (H): Primary | ICD-10-CM

## 2024-02-05 DIAGNOSIS — E78.5 HYPERLIPIDEMIA LDL GOAL <100: ICD-10-CM

## 2024-02-05 DIAGNOSIS — L98.9 SKIN DISORDER: ICD-10-CM

## 2024-02-05 PROCEDURE — 99214 OFFICE O/P EST MOD 30 MIN: CPT | Performed by: INTERNAL MEDICINE

## 2024-02-05 PROCEDURE — G0481 DRUG TEST DEF 8-14 CLASSES: HCPCS | Performed by: INTERNAL MEDICINE

## 2024-02-05 RX ORDER — GLIPIZIDE 5 MG/1
TABLET ORAL
Start: 2024-02-05 | End: 2024-02-07
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
## 2024-02-05 NOTE — PATIENT INSTRUCTIONS
Stop metformin and  stop coffee/tea in AM for now.   Reduce  Glipizide to  1 tab daily in AM only   If still low sugar  feeling, then  let me know  If bowel movements not better after 3-4 days, then let me know via Mychart. If better,however, then try adding back warm decaf tea in AM to see if stools remain OK or not.  Urine sample today for pain medication use   Repeat A1C 3 months nonfasting  Referral to [facility] to have facial skin lesion removed. They will call to schedule

## 2024-02-06 PROBLEM — E66.01 SEVERE OBESITY WITH BODY MASS INDEX (BMI) OF 35.0 TO 39.9 WITH COMORBIDITY (H): Status: RESOLVED | Noted: 2021-06-09 | Resolved: 2024-02-06

## 2024-02-07 ENCOUNTER — TELEPHONE (OUTPATIENT)
Dept: DERMATOLOGY | Facility: CLINIC | Age: 79
End: 2024-02-07
Payer: COMMERCIAL

## 2024-02-07 DIAGNOSIS — E11.21 TYPE 2 DIABETES MELLITUS WITH DIABETIC NEPHROPATHY, WITH LONG-TERM CURRENT USE OF INSULIN (H): ICD-10-CM

## 2024-02-07 DIAGNOSIS — Z79.4 TYPE 2 DIABETES MELLITUS WITH DIABETIC NEPHROPATHY, WITH LONG-TERM CURRENT USE OF INSULIN (H): ICD-10-CM

## 2024-02-07 LAB — CREAT UR-MCNC: 48 MG/DL

## 2024-02-07 RX ORDER — GLIPIZIDE 5 MG/1
TABLET ORAL
Qty: 180 TABLET | Refills: 0 | Status: SHIPPED | OUTPATIENT
Start: 2024-02-07 | End: 2024-05-07

## 2024-02-07 NOTE — TELEPHONE ENCOUNTER
Called and scheduled for [facility] derm for   growing raised 6mm skin lesion left facial cheek for 9 years. Started after skin trauma  Wishes to have removed        Jaycee Brown, Procedure  2/7/2024 4:29 PM

## 2024-02-08 LAB
GABAPENTIN UR QL CFM: PRESENT
N-NORTRAMADOL/CREAT UR CFM: ABNORMAL NG/MG {CREAT}
O-NORTRAMADOL UR CFM-MCNC: 6680 NG/ML
TRAMADOL CTO UR CFM-MCNC: 6480 NG/ML
TRAMADOL/CREAT UR: ABNORMAL NG/MG {CREAT}

## 2024-02-19 DIAGNOSIS — E11.21 TYPE 2 DIABETES MELLITUS WITH DIABETIC NEPHROPATHY, WITH LONG-TERM CURRENT USE OF INSULIN (H): ICD-10-CM

## 2024-02-19 DIAGNOSIS — Z79.4 TYPE 2 DIABETES MELLITUS WITH DIABETIC NEPHROPATHY, WITH LONG-TERM CURRENT USE OF INSULIN (H): ICD-10-CM

## 2024-02-19 RX ORDER — LANCETS
EACH MISCELLANEOUS
Qty: 100 EACH | Refills: 1 | Status: SHIPPED | OUTPATIENT
Start: 2024-02-19 | End: 2024-05-08

## 2024-02-19 NOTE — TELEPHONE ENCOUNTER
Pt's daughter Candis (C2C) called the clinic stating that she is at the pharmacy now, and hoping to get blood glucose monitoring (SOFTCLIX) lancets refilled.     Routing to refill team HP.   Thank you,  Alyssia Bustillos RN

## 2024-03-19 DIAGNOSIS — E11.21 TYPE 2 DIABETES MELLITUS WITH DIABETIC NEPHROPATHY, WITH LONG-TERM CURRENT USE OF INSULIN (H): ICD-10-CM

## 2024-03-19 DIAGNOSIS — Z79.4 TYPE 2 DIABETES MELLITUS WITH DIABETIC NEPHROPATHY, WITH LONG-TERM CURRENT USE OF INSULIN (H): ICD-10-CM

## 2024-04-01 DIAGNOSIS — E11.21 TYPE 2 DIABETES MELLITUS WITH DIABETIC NEPHROPATHY, WITH LONG-TERM CURRENT USE OF INSULIN (H): ICD-10-CM

## 2024-04-01 DIAGNOSIS — Z79.4 TYPE 2 DIABETES MELLITUS WITH DIABETIC NEPHROPATHY, WITH LONG-TERM CURRENT USE OF INSULIN (H): ICD-10-CM

## 2024-04-02 NOTE — TELEPHONE ENCOUNTER
Med previously help in case causing loose stools but pt requesting to restart so assuming issue better. Med refilled

## 2024-04-08 DIAGNOSIS — G89.29 CHRONIC RIGHT-SIDED LOW BACK PAIN WITH RIGHT-SIDED SCIATICA: ICD-10-CM

## 2024-04-08 DIAGNOSIS — M54.41 CHRONIC RIGHT-SIDED LOW BACK PAIN WITH RIGHT-SIDED SCIATICA: ICD-10-CM

## 2024-04-09 RX ORDER — GABAPENTIN 300 MG/1
300 CAPSULE ORAL 3 TIMES DAILY
Qty: 270 CAPSULE | Refills: 3 | Status: SHIPPED | OUTPATIENT
Start: 2024-04-09

## 2024-04-22 NOTE — TELEPHONE ENCOUNTER
Timing of RF appropriate.  UTD re: clinic visit/evisit every 3 months. [place] on [place]. Rx electronically sent to pharmacy.  GFR also reviewed and normal    
Prescription approved per [facility] Refill Protocol.  Josee Houston RN      
Routing refill request to provider for review/approval because:    Drug not on the [facility] refill protocol     NSAID Medications [id number]/11/2021 12:43 PM   Patient is age 6-64 years Protocol Details    Normal CBC on file in past 12 months      Josee Houston RN   
22-Apr-2024 23:08
16-Apr-2024 21:20
17-Apr-2024
21-Apr-2024 15:04

## 2024-04-24 DIAGNOSIS — E11.21 TYPE 2 DIABETES MELLITUS WITH DIABETIC NEPHROPATHY, WITH LONG-TERM CURRENT USE OF INSULIN (H): ICD-10-CM

## 2024-04-24 DIAGNOSIS — Z79.4 TYPE 2 DIABETES MELLITUS WITH DIABETIC NEPHROPATHY, WITH LONG-TERM CURRENT USE OF INSULIN (H): ICD-10-CM

## 2024-04-24 RX ORDER — BLOOD SUGAR DIAGNOSTIC
STRIP MISCELLANEOUS
Qty: 200 STRIP | Refills: 0 | Status: SHIPPED | OUTPATIENT
Start: 2024-04-24 | End: 2024-09-11

## 2024-04-24 NOTE — TELEPHONE ENCOUNTER
Prescription approved per [facility] Refill Protocol.  Elsie Baxter, RN  [facility] Triage Nurse

## 2024-05-07 ENCOUNTER — LAB (OUTPATIENT)
Dept: LAB | Facility: CLINIC | Age: 79
End: 2024-05-07
Payer: COMMERCIAL

## 2024-05-07 DIAGNOSIS — I10 ESSENTIAL HYPERTENSION WITH GOAL BLOOD PRESSURE LESS THAN 130/80: ICD-10-CM

## 2024-05-07 DIAGNOSIS — Z79.4 TYPE 2 DIABETES MELLITUS WITH DIABETIC NEPHROPATHY, WITH LONG-TERM CURRENT USE OF INSULIN (H): ICD-10-CM

## 2024-05-07 DIAGNOSIS — M54.41 CHRONIC RIGHT-SIDED LOW BACK PAIN WITH RIGHT-SIDED SCIATICA: ICD-10-CM

## 2024-05-07 DIAGNOSIS — E11.21 TYPE 2 DIABETES MELLITUS WITH DIABETIC NEPHROPATHY, WITH LONG-TERM CURRENT USE OF INSULIN (H): ICD-10-CM

## 2024-05-07 DIAGNOSIS — F11.90 CHRONIC, CONTINUOUS USE OF OPIOIDS: ICD-10-CM

## 2024-05-07 DIAGNOSIS — G89.29 CHRONIC RIGHT-SIDED LOW BACK PAIN WITH RIGHT-SIDED SCIATICA: ICD-10-CM

## 2024-05-07 LAB — HBA1C MFR BLD: 7.3 % (ref 0–5.6)

## 2024-05-07 PROCEDURE — 36415 COLL VENOUS BLD VENIPUNCTURE: CPT

## 2024-05-07 PROCEDURE — 83036 HEMOGLOBIN GLYCOSYLATED A1C: CPT

## 2024-05-07 RX ORDER — GLIPIZIDE 5 MG/1
TABLET ORAL
Qty: 180 TABLET | Refills: 0 | Status: SHIPPED | OUTPATIENT
Start: 2024-05-07 | End: 2024-07-30

## 2024-05-07 RX ORDER — AMLODIPINE BESYLATE 5 MG/1
TABLET ORAL
Qty: 90 TABLET | Refills: 1 | Status: SHIPPED | OUTPATIENT
Start: 2024-05-07 | End: 2024-07-09

## 2024-05-08 DIAGNOSIS — E11.21 TYPE 2 DIABETES MELLITUS WITH DIABETIC NEPHROPATHY, WITH LONG-TERM CURRENT USE OF INSULIN (H): ICD-10-CM

## 2024-05-08 DIAGNOSIS — Z79.4 TYPE 2 DIABETES MELLITUS WITH DIABETIC NEPHROPATHY, WITH LONG-TERM CURRENT USE OF INSULIN (H): ICD-10-CM

## 2024-05-08 DIAGNOSIS — I10 ESSENTIAL HYPERTENSION WITH GOAL BLOOD PRESSURE LESS THAN 130/80: ICD-10-CM

## 2024-05-08 DIAGNOSIS — R00.2 PALPITATIONS: ICD-10-CM

## 2024-05-08 RX ORDER — LANCETS
EACH MISCELLANEOUS
Qty: 200 EACH | Refills: 0 | Status: SHIPPED | OUTPATIENT
Start: 2024-05-08 | End: 2024-09-11

## 2024-05-08 RX ORDER — LOSARTAN POTASSIUM 100 MG/1
TABLET ORAL
Qty: 90 TABLET | Refills: 2 | Status: SHIPPED | OUTPATIENT
Start: 2024-05-08

## 2024-05-08 RX ORDER — TRAMADOL HYDROCHLORIDE 50 MG/1
50 TABLET ORAL 2 TIMES DAILY
Qty: 60 TABLET | Refills: 0 | Status: SHIPPED | OUTPATIENT
Start: 2024-05-08 | End: 2024-05-21

## 2024-05-08 RX ORDER — BISOPROLOL FUMARATE 5 MG/1
TABLET, FILM COATED ORAL
Qty: 90 TABLET | Refills: 0 | Status: SHIPPED | OUTPATIENT
Start: 2024-05-08 | End: 2024-09-22

## 2024-05-08 NOTE — TELEPHONE ENCOUNTER
Timing of RF appropriate.  UTD re: clinic visit/evisit (with current level of clinic overcrowded scheduling and stability with controlled substance prescription dosing use, patient being seen every 6 months). [place] file. Rx electronically sent to pharmacy. Daughter Poolay notified. Daughter states pt will be going out of the country part of June and July

## 2024-05-08 NOTE — TELEPHONE ENCOUNTER
Daughter called, pt is out of tramadol medication. Routing high priority to be signed today if possible.     Michelle Espitia RN

## 2024-05-08 NOTE — TELEPHONE ENCOUNTER
Prescription approved per [facility] Refill Protocol.  Elsie Baxter, RN  [facility] Triage Nurse

## 2024-05-21 DIAGNOSIS — M54.41 CHRONIC RIGHT-SIDED LOW BACK PAIN WITH RIGHT-SIDED SCIATICA: ICD-10-CM

## 2024-05-21 DIAGNOSIS — F11.90 CHRONIC, CONTINUOUS USE OF OPIOIDS: ICD-10-CM

## 2024-05-21 DIAGNOSIS — G89.29 CHRONIC RIGHT-SIDED LOW BACK PAIN WITH RIGHT-SIDED SCIATICA: ICD-10-CM

## 2024-05-21 RX ORDER — TRAMADOL HYDROCHLORIDE 50 MG/1
50 TABLET ORAL 2 TIMES DAILY
Qty: 60 TABLET | Refills: 0 | Status: SHIPPED | OUTPATIENT
Start: 2024-05-21 | End: 2024-07-09

## 2024-05-21 NOTE — TELEPHONE ENCOUNTER
Received a call from the patient and the patient's Daughter. Patient is leaving to go out of the country on May 29, 2024 and needs a one month refill of her Tramadol sent to the pharmacy.     Pharmacist states they are needing...  A new script  Approval to fill the medication early  What date can the medication be filled on?    Will route to PCP for review. Medication pended.     Thank you,  Elsie Baxter RN

## 2024-05-24 NOTE — TELEPHONE ENCOUNTER
Reason for Call:  Other call back    Detailed comments: pt would like a call back to discuss the inspire sleep device.     Phone Number Patient can be reached at: Cell number on file:    Telephone Information:   Mobile [phone]       Best Time: any    Can we leave a detailed message on this number? YES    Call taken on 1/14/2022 at 5:17 PM by Smitha Hendricks       left sided muscular chest pain

## 2024-07-06 ENCOUNTER — HEALTH MAINTENANCE LETTER (OUTPATIENT)
Age: 79
End: 2024-07-06

## 2024-07-08 DIAGNOSIS — G89.29 CHRONIC RIGHT-SIDED LOW BACK PAIN WITH RIGHT-SIDED SCIATICA: ICD-10-CM

## 2024-07-08 DIAGNOSIS — F11.90 CHRONIC, CONTINUOUS USE OF OPIOIDS: ICD-10-CM

## 2024-07-08 DIAGNOSIS — I10 ESSENTIAL HYPERTENSION WITH GOAL BLOOD PRESSURE LESS THAN 130/80: ICD-10-CM

## 2024-07-08 DIAGNOSIS — M54.41 CHRONIC RIGHT-SIDED LOW BACK PAIN WITH RIGHT-SIDED SCIATICA: ICD-10-CM

## 2024-07-08 RX ORDER — AMLODIPINE BESYLATE 5 MG/1
TABLET ORAL
Qty: 90 TABLET | Refills: 0 | OUTPATIENT
Start: 2024-07-08

## 2024-07-09 RX ORDER — TRAMADOL HYDROCHLORIDE 50 MG/1
50 TABLET ORAL 2 TIMES DAILY
Qty: 60 TABLET | Refills: 1 | Status: SHIPPED | OUTPATIENT
Start: 2024-07-09 | End: 2024-08-13

## 2024-07-09 RX ORDER — AMLODIPINE BESYLATE 5 MG/1
5 TABLET ORAL DAILY
Qty: 90 TABLET | Refills: 3 | Status: SHIPPED | OUTPATIENT
Start: 2024-07-09

## 2024-07-09 NOTE — TELEPHONE ENCOUNTER
Spoke to pt's daughter Candis and appointment for chronic main management is scheduled for 8/12 at 3:40 ok per pcp.

## 2024-07-09 NOTE — TELEPHONE ENCOUNTER
[place] and timing of RF appropriate. Pt last seen in feb 2024 and due for follow-up appt with me in August for chronic pain management review with use of narcotic medication (Tramadol)  Call pt's daughter  Candis as she generally accompanies and drives pt to appts and set up a follow-up appt with me sometime in August. May use open [facility] or same day/next day appt slot for appt with me. Tramadol rfilled

## 2024-07-11 ENCOUNTER — TELEPHONE (OUTPATIENT)
Dept: AUDIOLOGY | Facility: CLINIC | Age: 79
End: 2024-07-11
Payer: COMMERCIAL

## 2024-07-11 NOTE — TELEPHONE ENCOUNTER
Spoke with patient's daughter who reported that she lost the right CROS transmitter while traveling. She would like to order a Loss and Damage replacement. We will contact her when the hearing aid is in clinic.    Kusum Shukla, [facility]  Licensed Audiologist  [place] License #[id number]

## 2024-07-11 NOTE — TELEPHONE ENCOUNTER
[facility] Call [facility]    Phone Message    May a detailed message be left on voicemail: yes     Reason for Call: Other: Pt daughter is requesting hearing aid replacement. Pt lost one of her hearing aids while traveling. She is not sure if the hearing aids are under warranty still or not. Would like to speak with provider. Please call daughter to discuss. Thanks      Action Taken: Message routed to:  Other: AUDIO    Travel Screening: Not Applicable     Date of Service:

## 2024-07-17 ENCOUNTER — TELEPHONE (OUTPATIENT)
Dept: AUDIOLOGY | Facility: CLINIC | Age: 79
End: 2024-07-17
Payer: COMMERCIAL

## 2024-07-17 NOTE — TELEPHONE ENCOUNTER
The patient's replacement right hearing aid has arrived at the clinic. Please contact her to schedule a 30 min hearing aid check appointment to  the device and ensure functionality with her left device.

## 2024-07-19 ENCOUNTER — TELEPHONE (OUTPATIENT)
Dept: AUDIOLOGY | Facility: CLINIC | Age: 79
End: 2024-07-19
Payer: COMMERCIAL

## 2024-07-19 NOTE — TELEPHONE ENCOUNTER
Left Voicemail (2nd Attempt) for the patient to call back and schedule the following:    Appointment type: Hearing Aid Check  Provider: Any Audiologist  Return date: Patients convenience  Specialty phone number: [phone]  Additional appointment(s) needed: No  Additonal Notes: Appointment to  the device and ensure functionality with her left device

## 2024-07-22 ENCOUNTER — TELEPHONE (OUTPATIENT)
Dept: AUDIOLOGY | Facility: CLINIC | Age: 79
End: 2024-07-22
Payer: COMMERCIAL

## 2024-07-22 NOTE — TELEPHONE ENCOUNTER
LVM instructing pt to schedule a Hearing Aid Check instead of just picking up HA's incase they need adjustments

## 2024-07-29 DIAGNOSIS — E11.21 TYPE 2 DIABETES MELLITUS WITH DIABETIC NEPHROPATHY, WITH LONG-TERM CURRENT USE OF INSULIN (H): ICD-10-CM

## 2024-07-29 DIAGNOSIS — Z79.4 TYPE 2 DIABETES MELLITUS WITH DIABETIC NEPHROPATHY, WITH LONG-TERM CURRENT USE OF INSULIN (H): ICD-10-CM

## 2024-07-30 RX ORDER — GLIPIZIDE 5 MG/1
TABLET ORAL
Qty: 120 TABLET | Refills: 0 | Status: SHIPPED | OUTPATIENT
Start: 2024-07-30 | End: 2024-08-13

## 2024-08-12 ENCOUNTER — OFFICE VISIT (OUTPATIENT)
Dept: INTERNAL MEDICINE | Facility: CLINIC | Age: 79
End: 2024-08-12
Payer: COMMERCIAL

## 2024-08-12 ENCOUNTER — OFFICE VISIT (OUTPATIENT)
Dept: AUDIOLOGY | Facility: CLINIC | Age: 79
End: 2024-08-12
Payer: COMMERCIAL

## 2024-08-12 VITALS
TEMPERATURE: 97.6 F | OXYGEN SATURATION: 98 % | SYSTOLIC BLOOD PRESSURE: 134 MMHG | DIASTOLIC BLOOD PRESSURE: 66 MMHG | WEIGHT: 189.5 LBS | BODY MASS INDEX: 34.66 KG/M2 | HEART RATE: 65 BPM

## 2024-08-12 DIAGNOSIS — F11.90 CHRONIC, CONTINUOUS USE OF OPIOIDS: ICD-10-CM

## 2024-08-12 DIAGNOSIS — Z79.4 TYPE 2 DIABETES MELLITUS WITH DIABETIC NEPHROPATHY, WITH LONG-TERM CURRENT USE OF INSULIN (H): Primary | ICD-10-CM

## 2024-08-12 DIAGNOSIS — E11.21 TYPE 2 DIABETES MELLITUS WITH DIABETIC NEPHROPATHY, WITH LONG-TERM CURRENT USE OF INSULIN (H): Primary | ICD-10-CM

## 2024-08-12 DIAGNOSIS — G89.29 CHRONIC RIGHT-SIDED LOW BACK PAIN WITH RIGHT-SIDED SCIATICA: ICD-10-CM

## 2024-08-12 DIAGNOSIS — E78.5 HYPERLIPIDEMIA LDL GOAL <100: ICD-10-CM

## 2024-08-12 DIAGNOSIS — H90.3 SENSORINEURAL HEARING LOSS, BILATERAL: Primary | ICD-10-CM

## 2024-08-12 DIAGNOSIS — M54.41 CHRONIC RIGHT-SIDED LOW BACK PAIN WITH RIGHT-SIDED SCIATICA: ICD-10-CM

## 2024-08-12 LAB
ANION GAP SERPL CALCULATED.3IONS-SCNC: 12 MMOL/L (ref 7–15)
BUN SERPL-MCNC: 15.8 MG/DL (ref 8–23)
CALCIUM SERPL-MCNC: 9.3 MG/DL (ref 8.8–10.4)
CHLORIDE SERPL-SCNC: 101 MMOL/L (ref 98–107)
CREAT SERPL-MCNC: 0.88 MG/DL (ref 0.51–0.95)
EGFRCR SERPLBLD CKD-EPI 2021: 67 ML/MIN/1.73M2
GLUCOSE SERPL-MCNC: 204 MG/DL (ref 70–99)
HBA1C MFR BLD: 7.9 % (ref 0–5.6)
HCO3 SERPL-SCNC: 25 MMOL/L (ref 22–29)
POTASSIUM SERPL-SCNC: 4.4 MMOL/L (ref 3.4–5.3)
SODIUM SERPL-SCNC: 138 MMOL/L (ref 135–145)

## 2024-08-12 PROCEDURE — G2211 COMPLEX E/M VISIT ADD ON: HCPCS | Performed by: INTERNAL MEDICINE

## 2024-08-12 PROCEDURE — 99214 OFFICE O/P EST MOD 30 MIN: CPT | Performed by: INTERNAL MEDICINE

## 2024-08-12 PROCEDURE — 83036 HEMOGLOBIN GLYCOSYLATED A1C: CPT | Performed by: INTERNAL MEDICINE

## 2024-08-12 PROCEDURE — 80048 BASIC METABOLIC PNL TOTAL CA: CPT | Performed by: INTERNAL MEDICINE

## 2024-08-12 PROCEDURE — 36415 COLL VENOUS BLD VENIPUNCTURE: CPT | Performed by: INTERNAL MEDICINE

## 2024-08-12 PROCEDURE — 92592 PR HEARING AID CHECK, MONAURAL: CPT | Performed by: AUDIOLOGIST

## 2024-08-12 ASSESSMENT — PATIENT HEALTH QUESTIONNAIRE - PHQ9
SUM OF ALL RESPONSES TO PHQ QUESTIONS 1-9: 9
SUM OF ALL RESPONSES TO PHQ QUESTIONS 1-9: 9
10. IF YOU CHECKED OFF ANY PROBLEMS, HOW DIFFICULT HAVE THESE PROBLEMS MADE IT FOR YOU TO DO YOUR WORK, TAKE CARE OF THINGS AT HOME, OR GET ALONG WITH OTHER PEOPLE: SOMEWHAT DIFFICULT

## 2024-08-12 ASSESSMENT — ANXIETY QUESTIONNAIRES
3. WORRYING TOO MUCH ABOUT DIFFERENT THINGS: NEARLY EVERY DAY
IF YOU CHECKED OFF ANY PROBLEMS ON THIS QUESTIONNAIRE, HOW DIFFICULT HAVE THESE PROBLEMS MADE IT FOR YOU TO DO YOUR WORK, TAKE CARE OF THINGS AT HOME, OR GET ALONG WITH OTHER PEOPLE: SOMEWHAT DIFFICULT
2. NOT BEING ABLE TO STOP OR CONTROL WORRYING: NEARLY EVERY DAY
7. FEELING AFRAID AS IF SOMETHING AWFUL MIGHT HAPPEN: SEVERAL DAYS
7. FEELING AFRAID AS IF SOMETHING AWFUL MIGHT HAPPEN: SEVERAL DAYS
GAD7 TOTAL SCORE: 13
4. TROUBLE RELAXING: NEARLY EVERY DAY
6. BECOMING EASILY ANNOYED OR IRRITABLE: MORE THAN HALF THE DAYS
1. FEELING NERVOUS, ANXIOUS, OR ON EDGE: SEVERAL DAYS
8. IF YOU CHECKED OFF ANY PROBLEMS, HOW DIFFICULT HAVE THESE MADE IT FOR YOU TO DO YOUR WORK, TAKE CARE OF THINGS AT HOME, OR GET ALONG WITH OTHER PEOPLE?: SOMEWHAT DIFFICULT
5. BEING SO RESTLESS THAT IT IS HARD TO SIT STILL: NOT AT ALL

## 2024-08-12 NOTE — PROGRESS NOTES
AUDIOLOGY REPORT    SUBJECTIVE: Essie Huddleston is a 79 year old female who was seen in the Audiology Clinic at [facility] on 8/12/2024. She was accompanied by her daughter. The patient was originally scheduled for 8/15/2024, but she arrived at the clinic today and was able to be added on. Previous results have revealed normal hearing with mild likely sensorineural hearing loss at 8000 Hz for the left ear and severe to profound sensorineural hearing loss for the right ear. The patient has been seen previously in this clinic and was fit with a left Oticon Real 2 - R hearing aid and a right Oticon CROS Px - R transmitter on 11/20/2023. The right hearing aid is being replaced under the loss and damage warranty.    OBJECTIVE: The previous programming settings were downloaded to both devices. Essie reported good volume and sound quality.    ASSESSMENT: A hearing aid check was completed today.     PLAN: Essie will return for follow-up as needed, or at least every 9-12 months for cleaning and assessment of the devices. Please call this clinic with any questions regarding today s appointment.      Ji Razo, [facility]  Licensed Audiologist  [place] #[id number]

## 2024-08-12 NOTE — PROGRESS NOTES
ASSESSMENT:    1. Type 2 diabetes mellitus with diabetic nephropathy, with long-term current use of insulin (H)  Needs improved control.  Check updated A1c status today.  Increase Jardiance initially to 20 mg daily until running out of 10 mg tablets and will increase further to 25 mg daily.  To prevent low blood sugar risk with increase Jardiance, will reduce glipizide to 5 mg daily and also reduce bedtime Levemir insulin mildly.  Patient will update me in 3 weeks regarding blood sugar status and recheck renal function with Jardiance increase.  Repeat diabetic lab also in 3 months  - Hemoglobin A1c; Future  - Basic metabolic panel; Future  - metFORMIN (GLUCOPHAGE) 1000 MG tablet; Take 1/2 tab in AM and 1 tab in PM  Dispense: 180 tablet; Refill: 3  - Hemoglobin A1c  - Basic metabolic panel  - empagliflozin (JARDIANCE) 25 MG TABS tablet; Take 1 tablet (25 mg) by mouth daily  Dispense: 90 tablet; Refill: 3  - glipiZIDE (GLUCOTROL) 5 MG tablet; Take 1 tablet by mouth once a day in the morning with breakfast  Dispense: 90 tablet; Refill: 3  - insulin detemir (LEVEMIR FLEXPEN/FLEXTOUCH) 100 UNIT/ML pen; INJECT 28 UNITS SUBCUTANEOUSLY IN THE EVENING  Dispense: 30 mL; Refill: 3  - Hemoglobin A1c; Future  - Comprehensive metabolic panel; Future  - Albumin Random Urine Quantitative with Creat Ratio; Future  - Hemoglobin A1c; Future  - Basic metabolic panel; Future    2. Hyperlipidemia LDL goal <100  Previously controlled.  Continue simvastatin.  Repeat labs 6 months  - Lipid panel reflex to direct LDL Fasting; Future  - Comprehensive metabolic panel; Future    3. Chronic, continuous use of opioids  Overall stable pain control.  Gets occasional lumbar radiating pain into her right lower extremity but patient states symptoms not to the point that she wishes to have a repeat LESI done through [facility] at this time.  Will continue tramadol. MN  reviewed.  Due for next refill 9/5/24.  Urine drug screen up-to-date  -  "traMADol (ULTRAM) 50 MG tablet; Take 1 tablet (50 mg) by mouth 2 times daily  Dispense: 60 tablet; Refill: 5    4. Chronic right-sided low back pain with right-sided sciatica   See #3  - traMADol (ULTRAM) 50 MG tablet; Take 1 tablet (50 mg) by mouth 2 times daily  Dispense: 60 tablet; Refill: 5      PLAN:  Increase Jardiance to 10 mg tablet, 2 tablets (total 20 mg) once a day in the morning until running out of the 10 mg tablets.  Then increase to Jardiance 25 mg tablet, 1 tablet daily in the morning for diabetes  Reduce glipizide to 5 mg tablet, 1 tablet daily in the morning only  Reduce Levemir insulin to 28 units once a day at bedtime  Continue other medications.  You may fill your tramadol again at your pharmacy on 9/5/2024  Eye exam appointment 8/23/2024 as scheduled. Please ask the eye clinic to fax us a report of your eye exam to [phone], attention Dr Driscoll  Call  [phone] or use [employer] to schedule a future lab appointment  non-fasting in 3 weeks to recheck kidney function with Jardiance dose increase.  Also schedule a nonfasting lab appointment in 3 months to recheck your diabetic A1c lab and schedule a fasting lab appointment in 6 months to follow-up on diabetes/cholesterol/etc.  For fasting labs, please refrain from eating for 8 hours or more.   Drink 2 glasses of water before your lab appointment. It is fine to take your  oral medications on the morning of the lab test as usual  Schedule a follow up appointment with me in clinic a few days after these future labs are drawn in 6 months to review results and to follow-up on chronic pain medication use/management.  Because non-acute appointments to see me in clinic are currently booking out about 3 months at the clinic (for multiple reasons), please use [employer] or call the appointment line now to schedule the future appointment so that it is \"on the books\".   If back pain radiation into your leg worsens in the future, then follow-up with [facility] for possible repeat spinal steroid injection  I would recommend an updated covid vaccination early Fall when available and an influenza/flu vaccination in the Fall (mid/late October) 2024 at the clinic or any pharmacy  I would recommend  a  Td vaccine  (tetanus risk reduction) this month and RSV vaccine (respiratory syncytial virus risk reduction) in September. Get at a pharmacy  Please also check blood sugars twice a day (before breakfast and bedtime) for 4 days in 3 weeks and send me the results in a [employer] messasge to see how the blood sugars are doing with the above medication changes                       Netta Fountain is a 79 year old, presenting for the following health issues:  RECHECK, Diabetes, and Recheck Medication      8/12/2024     4:08 PM   Additional Questions   Roomed by gigi     History of Present Illness       Diabetes:   She presents for follow up of diabetes.  She is checking home blood glucose two times daily.   She checks blood glucose before meals and at bedtime.  Blood glucose is sometimes over 200 and never under 70. She is aware of hypoglycemia symptoms including confusion and other.   She is concerned about other.   She is having numbness in feet, burning in feet and blurry vision.  The patient has had a diabetic eye exam in the last 12 months. Eye exam performed on May 16th 2023. Location of last eye exam [facility].        She eats 0-1 servings of fruits and vegetables daily.She consumes 4 sweetened beverage(s) daily.She exercises with enough effort to increase her heart rate 9 or less minutes per day.  She exercises with enough effort to increase her heart rate 3 or less days per week.   She is taking medications regularly.       Most recent lab results reviewed with pt and daughter here today.    Blood sugars in the morning range .  Bedtime blood sugars generally 180-190.  Following diabetic diet.  Rare low blood sugar feeling in the morning.  Denies CP, SOB,  "abdominal pain, polyuria, polydipsia, vision changes,  or skin problems.  Back pain overall controlled with BID tramadol.  Occasional radiculopathy pain into the right lower extremity but not to the point that patient wishes to consider a repeat LESI at this time.  Previously had been done through [facility].  Denies bowel or bladder incontinence.  No lower extremity weakness.  Has eye exam scheduled for 8/23/2024.  Following diabetic diet  Had a wonderful time traveling to see family and [facility].  Due for next  tramadol refill 9/5/24  per review of MN   Denies constipation side effects with tramadol.  Energy okay   UDS  UTD      Additional ROS:   Constitutional, HEENT, Cardiovascular, Pulmonary, GI and , Neuro, MSK and Psych review of systems/symptoms are otherwise negative or unchanged from previous, except as noted above.      OBJECTIVE:  /66 (Cuff Size: Adult Regular)   Pulse 65   Temp 97.6  F (36.4  C) (Oral)   Wt 86 kg (189 lb 8 oz)   SpO2 98%   BMI 34.66 kg/m     Estimated body mass index is 34.66 kg/m  as calculated from the following:    Height as of 2/5/24: 1.575 m (5' 2\").    Weight as of this encounter: 86 kg (189 lb 8 oz).     Pulm: Lungs clear to auscultation   CV: Regular rates and rhythm  GI: Soft, nontender, Normal active bowel sounds, No hepatosplenomegaly or masses palpable  Ext: Peripheral pulses intact. No edema.  Neuro: Normal strength and tone, sensory exam grossly normal. Stable gait without assistance  Back: Tenderness to palpation right  paralumbar musculature.     The longitudinal plan of care for the diagnosis(es)/condition(s) as documented were addressed during this visit. Due to the added complexity in care, I will continue to support Essie in the subsequent management and with ongoing continuity of care.    (Chart documentation was completed, in part, with [employer] voice-recognition software. Even though reviewed, some grammatical, spelling, and word errors may " remain.)    Luigi Driscoll MD  Internal Medicine Department  [facility]

## 2024-08-13 RX ORDER — INSULIN DETEMIR 100 [IU]/ML
INJECTION, SOLUTION SUBCUTANEOUS
Qty: 30 ML | Refills: 3 | Status: SHIPPED | OUTPATIENT
Start: 2024-08-13

## 2024-08-13 RX ORDER — TRAMADOL HYDROCHLORIDE 50 MG/1
50 TABLET ORAL 2 TIMES DAILY
Qty: 60 TABLET | Refills: 5 | Status: SHIPPED | OUTPATIENT
Start: 2024-08-13

## 2024-08-13 RX ORDER — GLIPIZIDE 5 MG/1
TABLET ORAL
Qty: 90 TABLET | Refills: 3 | Status: SHIPPED | OUTPATIENT
Start: 2024-08-13

## 2024-08-23 ENCOUNTER — TRANSFERRED RECORDS (OUTPATIENT)
Dept: HEALTH INFORMATION MANAGEMENT | Facility: CLINIC | Age: 79
End: 2024-08-23
Payer: COMMERCIAL

## 2024-09-06 ENCOUNTER — OFFICE VISIT (OUTPATIENT)
Dept: DERMATOLOGY | Facility: CLINIC | Age: 79
End: 2024-09-06
Payer: COMMERCIAL

## 2024-09-06 DIAGNOSIS — L82.1 DERMATOSIS PAPULOSA NIGRA: ICD-10-CM

## 2024-09-06 DIAGNOSIS — R20.8 SKIN PAIN: Primary | ICD-10-CM

## 2024-09-06 PROCEDURE — 99203 OFFICE O/P NEW LOW 30 MIN: CPT | Performed by: PHYSICIAN ASSISTANT

## 2024-09-06 RX ORDER — TRIAMCINOLONE ACETONIDE 1 MG/G
CREAM TOPICAL
COMMUNITY

## 2024-09-06 NOTE — PROGRESS NOTES
HPI:   Chief complaints: Essie Huddleston is a pleasant 79 year old female who presents for evaluation of several spots of concern on the neck, chest and the left cheek. The spot on the left cheek has been present for about 9 years. They are bothersome.       PHYSICAL EXAM:    There were no vitals taken for this visit.  Skin exam performed as follows: Type 5 skin. Mood appropriate  Alert and Oriented X 3. Well developed, well nourished in no distress.  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 Discussed shave removal of area on the left cheek vs cautery and treatment with cautery for the areas on the neck and chest. Discussed risk of dyspigmentation and recommend a test patch today. If she does well then will treat more areas. Will send in for a topical numbing prescription to apply 1 hour prior to treatment if the test spots do well.   --4 test patches done to the right lateral neck (no charge for this)  --If no dyspigmentation will plan on treating many at next OV with cautery  --Topical numbing prescribed; advised to apply 1 hour prior to appt          Follow-up: 6-8 weeks  CC:   Scribed By: Tejal Aleman, MS, PA-C

## 2024-09-06 NOTE — LETTER
9/6/2024      Essie Huddleston  [address]  [facility] [address]      Dear Colleague,    Thank you for referring your patient, Essie Huddleston, to the [facility]. Please see a copy of my visit note below.    HPI:   Chief complaints: Essie Huddleston is a pleasant 79 year old female who presents for evaluation of several spots of concern on the neck, chest and the left cheek. The spot on the left cheek has been present for about 9 years. They are bothersome.       PHYSICAL EXAM:    There were no vitals taken for this visit.  Skin exam performed as follows: Type 5 skin. Mood appropriate  Alert and Oriented X 3. Well developed, well nourished in no distress.  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 Discussed shave removal of area on the left cheek vs cautery and treatment with cautery for the areas on the neck and chest. Discussed risk of dyspigmentation and recommend a test patch today. If she does well then will treat more areas. Will send in for a topical numbing prescription to apply 1 hour prior to treatment if the test spots do well.   --4 test patches done to the right lateral neck (no charge for this)  --If no dyspigmentation will plan on treating many at next OV with cautery  --Topical numbing prescribed; advised to apply 1 hour prior to appt          Follow-up: 6-8 weeks  CC:   Scribed By: Tejal Aleman, MS, PAMARY      Again, thank you for allowing me to participate in the care of your patient.        Sincerely,        Tejal Aleman PA-C

## 2024-09-06 NOTE — PATIENT INSTRUCTIONS
Proper skin care from [facility]:    -Eliminate harsh soaps as they strip the natural oils from the skin, often resulting in dry itchy skin ( i.e. Dial, Zest, [place])  -Use mild soaps such as Cetaphil or Dove Sensitive Skin in the shower. You do not need to use soap on arms, legs, and trunk every time you shower unless visibly soiled.   -Avoid hot or cold showers.  -After showering, lightly dry off and apply moisturizing within 2-3 minutes. This will help trap moisture in the skin.   -Aggressive use of a moisturizer at least 1-2 times a day to the entire body (including -Vanicream, Cetaphil, Aquaphor or Cerave) and moisturize hands after every washing.  -We recommend using moisturizers that come in a tub that needs to be scooped out, not a pump. This has more of an oil base. It will hold moisture in your skin much better than a water base moisturizer. The above recommended are non-pore clogging.      Wear a sunscreen with at least SPF 30 on your face, ears, neck and V of the chest daily. Wear sunscreen on other areas of the body if those areas are exposed to the sun throughout the day. Sunscreens can contain physical and/or chemical blockers. Physical blockers are less likely to clog pores, these include zinc oxide and titanium dioxide. Reapply every two hour and after swimming.     Sunscreen examples: https://www.ewg.org/sunscreen/    UV radiation  UVA radiation remains constant throughout the day and throughout the year. It is a longer wavelength than UVB and therefore penetrates deeper into the skin leading to immediate and delayed tanning, photoaging, and skin cancer. 70-80% of UVA and UVB radiation occurs between the hours of 10am-2pm.  UVB radiation  UVB radiation causes the most harmful effects and is more significant during the summer months. However, snow and ice can reflect UVB radiation leading to skin damage during the winter months as well. UVB radiation is responsible for tanning,  burning, inflammation, delayed erythema (pinkness), pigmentation (brown spots), and skin cancer.     I recommend self monthly full body exams and yearly full body exams with a dermatology provider. If you develop a new or changing lesion please follow up for examination. Most skin cancers are pink and scaly or pink and pearly. However, we do see blue/brown/black skin cancers.  Consider the ABCDEs of melanoma when giving yourself your monthly full body exam ( don't forget the groin, buttocks, feet, toes, etc). A-asymmetry, B-borders, C-color, D-diameter, E-elevation or evolving. If you see any of these changes please follow up in clinic. If you cannot see your back I recommend purchasing a hand held mirror to use with a larger wall mirror.       Checking for Skin Cancer  You can find cancer early by checking your skin each month. There are 3 kinds of skin cancer. They are melanoma, basal cell carcinoma, and squamous cell carcinoma. Doing monthly skin checks is the best way to find new marks or skin changes. Follow the instructions below for checking your skin.   The ABCDEs of checking moles for melanoma   Check your moles or growths for signs of melanoma using ABCDE:   Asymmetry: the sides of the mole or growth don t match  Border: the edges are ragged, notched, or blurred  Color: the color within the mole or growth varies  Diameter: the mole or growth is larger than 6 mm (size of a pencil eraser)  Evolving: the size, shape, or color of the mole or growth is changing (evolving is not shown in the images below)    Checking for other types of skin cancer  Basal cell carcinoma or squamous cell carcinoma have symptoms such as:     A spot or mole that looks different from all other marks on your skin  Changes in how an area feels, such as itching, tenderness, or pain  Changes in the skin's surface, such as oozing, bleeding, or scaliness  A sore that does not heal  New swelling or redness beyond the border of a  mole    Who s at risk?  Anyone can get skin cancer. But you are at greater risk if you have:   Fair skin, light-colored hair, or light-colored eyes  Many moles or abnormal moles on your skin  A history of sunburns from sunlight or tanning beds  A family history of skin cancer  A history of exposure to radiation or chemicals  A weakened immune system  If you have had skin cancer in the past, you are at risk for recurring skin cancer.   How to check your skin  Do your monthly skin checkups in front of a full-length mirror. Check all parts of your body, including your:   Head (ears, face, neck, and scalp)  Torso (front, back, and sides)  Arms (tops, undersides, upper, and lower armpits)  Hands (palms, backs, and fingers, including under the nails)  Buttocks and genitals  Legs (front, back, and sides)  Feet (tops, soles, toes, including under the nails, and between toes)  If you have a lot of moles, take digital photos of them each month. Make sure to take photos both up close and from a distance. These can help you see if any moles change over time.   Most skin changes are not cancer. But if you see any changes in your skin, call your doctor right away. Only he or she can diagnose a problem. If you have skin cancer, seeing your doctor can be the first step toward getting the treatment that could save your life.   [employer] last reviewed this educational content on 4/1/2019 2000-2020 [employer]. [address] [place] [address], [place] [address]. All rights reserved. This information is not intended as a substitute for professional medical care. Always follow your healthcare professional's instructions.       When should I call my doctor?  If you are worsening or not improving, please, contact us or seek urgent care as noted below.     Who should I call with questions (adults)?    [facility] [phone]  For urgent needs outside of business hours call the [facility] at  [phone] and ask for the dermatology resident on call to be paged  If this is a medical emergency and you are unable to reach an ER, Call [phone]      If you need a prescription refill, please contact your pharmacy. Refills are approved or denied by our Physicians during normal business hours, Monday through Fridays  Per office policy, refills will not be granted if you have not been seen within the past year (or sooner depending on your child's condition)

## 2024-09-11 DIAGNOSIS — Z79.4 TYPE 2 DIABETES MELLITUS WITH DIABETIC NEPHROPATHY, WITH LONG-TERM CURRENT USE OF INSULIN (H): ICD-10-CM

## 2024-09-11 DIAGNOSIS — E11.21 TYPE 2 DIABETES MELLITUS WITH DIABETIC NEPHROPATHY, WITH LONG-TERM CURRENT USE OF INSULIN (H): ICD-10-CM

## 2024-09-11 RX ORDER — BLOOD SUGAR DIAGNOSTIC
STRIP MISCELLANEOUS
Qty: 200 STRIP | Refills: 2 | Status: SHIPPED | OUTPATIENT
Start: 2024-09-11

## 2024-09-11 RX ORDER — PEN NEEDLE, DIABETIC 31 GX5/16"
NEEDLE, DISPOSABLE MISCELLANEOUS
Qty: 100 EACH | Refills: 2 | Status: SHIPPED | OUTPATIENT
Start: 2024-09-11

## 2024-09-11 RX ORDER — LANCETS
EACH MISCELLANEOUS
Qty: 200 EACH | Refills: 2 | Status: SHIPPED | OUTPATIENT
Start: 2024-09-11

## 2024-09-16 ENCOUNTER — LAB (OUTPATIENT)
Dept: LAB | Facility: CLINIC | Age: 79
End: 2024-09-16
Payer: COMMERCIAL

## 2024-09-16 DIAGNOSIS — E11.21 TYPE 2 DIABETES MELLITUS WITH DIABETIC NEPHROPATHY, WITH LONG-TERM CURRENT USE OF INSULIN (H): ICD-10-CM

## 2024-09-16 DIAGNOSIS — Z79.4 TYPE 2 DIABETES MELLITUS WITH DIABETIC NEPHROPATHY, WITH LONG-TERM CURRENT USE OF INSULIN (H): ICD-10-CM

## 2024-09-16 LAB
ANION GAP SERPL CALCULATED.3IONS-SCNC: 9 MMOL/L (ref 7–15)
BUN SERPL-MCNC: 13.2 MG/DL (ref 8–23)
CALCIUM SERPL-MCNC: 9.2 MG/DL (ref 8.8–10.4)
CHLORIDE SERPL-SCNC: 106 MMOL/L (ref 98–107)
CREAT SERPL-MCNC: 0.74 MG/DL (ref 0.51–0.95)
EGFRCR SERPLBLD CKD-EPI 2021: 82 ML/MIN/1.73M2
GLUCOSE SERPL-MCNC: 146 MG/DL (ref 70–99)
HCO3 SERPL-SCNC: 26 MMOL/L (ref 22–29)
POTASSIUM SERPL-SCNC: 5 MMOL/L (ref 3.4–5.3)
SODIUM SERPL-SCNC: 141 MMOL/L (ref 135–145)

## 2024-09-16 PROCEDURE — 80048 BASIC METABOLIC PNL TOTAL CA: CPT

## 2024-09-16 PROCEDURE — 36415 COLL VENOUS BLD VENIPUNCTURE: CPT

## 2024-09-20 DIAGNOSIS — R00.2 PALPITATIONS: ICD-10-CM

## 2024-09-20 DIAGNOSIS — I10 ESSENTIAL HYPERTENSION WITH GOAL BLOOD PRESSURE LESS THAN 130/80: ICD-10-CM

## 2024-09-22 RX ORDER — BISOPROLOL FUMARATE 5 MG/1
TABLET, FILM COATED ORAL
Qty: 90 TABLET | Refills: 3 | Status: SHIPPED | OUTPATIENT
Start: 2024-09-22

## 2024-10-25 ENCOUNTER — OFFICE VISIT (OUTPATIENT)
Dept: DERMATOLOGY | Facility: CLINIC | Age: 79
End: 2024-10-25
Payer: COMMERCIAL

## 2024-10-25 DIAGNOSIS — L82.1 DERMATOSIS PAPULOSA NIGRA: Primary | ICD-10-CM

## 2024-10-25 PROCEDURE — 17111 DESTRUCTION B9 LESIONS 15/>: CPT | Performed by: PHYSICIAN ASSISTANT

## 2024-10-25 NOTE — PROGRESS NOTES
HPI:   Chief complaints: Essie Huddleston is a pleasant 79 year old female who presents for treatment of DPN on the neck and left cheek. Test spots on the right neck did well.       PHYSICAL EXAM:    There were no vitals taken for this visit.  Skin exam performed as follows: Type 5 skin. Mood appropriate  Alert and Oriented X 3. Well developed, well nourished in no distress.  General appearance: Normal  Head including face: Normal  Eyes: conjunctiva and lids: Normal  Mouth: Lips, teeth, gums: Normal  Neck: Normal  Skin: Scalp and body hair: See below    Numerous keratotic papules on the neck; large area on the left cheek    ASSESSMENT/PLAN:     DPN on the neck and face  --Numbing applied and allowed to sit for 30 minutes  --Areas treated with cautery at a setting of 6.0  --15 areas on the left treated; 10 areas on the right  --Pt tolerated well no complications          Follow-up: 4-6 months for more treatment  CC:   Scribed By: Tejal Aleman, MS, PAClaritzaC

## 2024-10-25 NOTE — LETTER
10/25/2024      Essie Huddleston  [address]  [facility] [address]      Dear Colleague,    Thank you for referring your patient, Essie Huddleston, to the [facility]. Please see a copy of my visit note below.    HPI:   Chief complaints: Essie Huddleston is a pleasant 79 year old female who presents for treatment of DPN on the neck and left cheek. Test spots on the right neck did well.       PHYSICAL EXAM:    There were no vitals taken for this visit.  Skin exam performed as follows: Type 5 skin. Mood appropriate  Alert and Oriented X 3. Well developed, well nourished in no distress.  General appearance: Normal  Head including face: Normal  Eyes: conjunctiva and lids: Normal  Mouth: Lips, teeth, gums: Normal  Neck: Normal  Skin: Scalp and body hair: See below    Numerous keratotic papules on the neck; large area on the left cheek    ASSESSMENT/PLAN:     DPN on the neck and face  --Numbing applied and allowed to sit for 30 minutes  --Areas treated with cautery at a setting of 6.0  --15 areas on the left treated; 10 areas on the right  --Pt tolerated well no complications          Follow-up: 4-6 months for more treatment  CC:   Scribed By: Tejal Aleman, MS, PAMARY      Again, thank you for allowing me to participate in the care of your patient.        Sincerely,        Tejal Aleman PA-C

## 2024-11-11 ENCOUNTER — LAB (OUTPATIENT)
Dept: LAB | Facility: CLINIC | Age: 79
End: 2024-11-11
Payer: COMMERCIAL

## 2024-11-11 DIAGNOSIS — E11.21 TYPE 2 DIABETES MELLITUS WITH DIABETIC NEPHROPATHY, WITH LONG-TERM CURRENT USE OF INSULIN (H): ICD-10-CM

## 2024-11-11 DIAGNOSIS — Z79.4 TYPE 2 DIABETES MELLITUS WITH DIABETIC NEPHROPATHY, WITH LONG-TERM CURRENT USE OF INSULIN (H): ICD-10-CM

## 2024-11-11 LAB
EST. AVERAGE GLUCOSE BLD GHB EST-MCNC: 192 MG/DL
HBA1C MFR BLD: 8.3 % (ref 0–5.6)

## 2024-11-11 PROCEDURE — 36415 COLL VENOUS BLD VENIPUNCTURE: CPT

## 2024-11-11 PROCEDURE — 83036 HEMOGLOBIN GLYCOSYLATED A1C: CPT

## 2024-11-20 ENCOUNTER — DOCUMENTATION ONLY (OUTPATIENT)
Dept: AUDIOLOGY | Facility: CLINIC | Age: 79
End: 2024-11-20
Payer: COMMERCIAL

## 2024-11-20 DIAGNOSIS — H90.41 SENSORINEURAL HEARING LOSS (SNHL) OF RIGHT EAR WITH UNRESTRICTED HEARING OF LEFT EAR: Primary | ICD-10-CM

## 2024-11-21 NOTE — PROGRESS NOTES
"The patient's hearing aids and  were dropped off at the clinic on 11/18/24 with a note indicating the charging cord was \"burnt\" and that one hearing aid was longer than the other one. Examination found the  cable showed signs of melting at one point along the cord. The \"longer\" hearing aid concern was likely related to the right CROS transmitter not having a retention lock like the left hearing aid. The devices were cleaned and the  filters and domes were replaced. A retention lock was added to the right CROS transmitter. A new  is being given to the patient from clinic stock. A listening check found the hearing aid and CROS to be working properly and a voicemail was left for the patient's daughter to pick them up. A clean & check charge is being billed for today's services.    London Arroyo  Audiology Clinic Assistant    I delegated the hearing aid service to the audiology assistant. I approve of the services provided.    Kusum Shukla, [facility]  Licensed Audiologist  [place] License #[id number]    "

## 2024-12-23 ENCOUNTER — TELEPHONE (OUTPATIENT)
Dept: INTERNAL MEDICINE | Facility: CLINIC | Age: 79
End: 2024-12-23
Payer: COMMERCIAL

## 2024-12-23 DIAGNOSIS — Z79.4 TYPE 2 DIABETES MELLITUS WITH DIABETIC NEPHROPATHY, WITH LONG-TERM CURRENT USE OF INSULIN (H): ICD-10-CM

## 2024-12-23 DIAGNOSIS — E11.21 TYPE 2 DIABETES MELLITUS WITH DIABETIC NEPHROPATHY, WITH LONG-TERM CURRENT USE OF INSULIN (H): ICD-10-CM

## 2024-12-24 ENCOUNTER — MYC MEDICAL ADVICE (OUTPATIENT)
Dept: INTERNAL MEDICINE | Facility: CLINIC | Age: 79
End: 2024-12-24
Payer: COMMERCIAL

## 2024-12-30 NOTE — TELEPHONE ENCOUNTER
Daughter calling. Consent to communicate on file.  Gave message from Dr. Driscoll in [employer]. Daughter agrees to  rx. Catherine Cameron RN

## 2025-01-14 ENCOUNTER — OFFICE VISIT (OUTPATIENT)
Dept: AUDIOLOGY | Facility: CLINIC | Age: 80
End: 2025-01-14
Payer: MEDICAID

## 2025-01-14 DIAGNOSIS — H90.3 SENSORINEURAL HEARING LOSS, ASYMMETRICAL: Primary | ICD-10-CM

## 2025-01-14 NOTE — PROGRESS NOTES
AUDIOLOGY REPORT    SUBJECTIVE:  Essie Huddleston is a 79 year old female who was seen in the Audiology Clinic at the [facility] for audiologic evaluation and hearing aid check, referred by Referred Self. The patient has been seen previously in this clinic on 07/05/2023 for assessment and results indicated a normal hearing with mild likely sensorineural hearing loss at 8000 Hz for the left ear and severe to profound sensorineural hearing loss for the right ear. Patient was fit with a left Oticon Real 2 - R hearing aid and a right Oticon CROS Px - R transmitter on 11/20/2023. The patient reports since her last hearing evaluation no changes in the left ear, but she feels like her right ear worsened. Patient states it has been harder for her to hear in noisier backgrounds with her right ear. Patient notes her ongoing right sided tinnitus has been stable. Patient mentions right ear itchiness and dizziness secondary to her diabetes meatus. The patient denies  bilateral drainage, bilateral aural fullness, history of ear surgeries, and history of noise exposure.  The patient notes difficulty with communication in a variety of listening situations. They were accompanied today by their daughter.    OBJECTIVE:  Abuse Screening:  Do you feel unsafe at home or work/school? No  Do you feel threatened by someone? No  Does anyone try to keep you from having contact with others, or doing things outside of your home? No  Physical signs of abuse present? No     Fall Risk Screen:  1. Have you fallen two or more times in the past year? No  2. Have you fallen and had an injury in the past year? No    Timed Up and Go Score (in seconds): not tested  Is patient a fall risk? No  Referral initiated: No  Fall Risk Assessment Completed by Audiology    Otoscopic exam indicates ears are clear of cerumen bilaterally     Pure Tone Thresholds assessed using conventional audiometry with fair to good   reliability from 250-8000 Hz bilaterally using insert earphones and circumaural headphones     RIGHT:  moderate-severe sloping to severe sensorineural hearing loss    LEFT:    normal sloping to mild sensorineural hearing loss    Tympanogram:    RIGHT: normal eardrum mobility    LEFT:   normal eardrum mobility    Reflexes (reported by stimulus ear):  RIGHT: Ipsilateral is absent at frequencies tested  RIGHT: Contralateral is present at normal levels  LEFT:   Ipsilateral is present at normal levels  LEFT:   Contralateral is present at normal levels      Speech Reception Threshold:    RIGHT: 60 dB HL (SAT)    LEFT:   30 dB HL  Word Recognition Score:     RIGHT: 24% at 110 dB HL using NU-6 recorded word list.    LEFT:   96% at 65 dB HL using NU-6 recorded word list.      HEARING AID CHECK:  Based on patient report, the following changes were made:    1) Cleaned and checked the hearing aids. Replaced the domes and wax traps. Patient reported good volume and sound quality.    2) Reviewed the frequency of cleaning and replacing the domes and wax traps. Reminded the patient of the drop-off services with London Arroyo, audiology assistant.    3) Discussed using mineral oil to help with the ear itchiness. Apply at night when you don't have the hearing aids in your ear. Patient will follow-up with her PCP for the right sided jaw pain in case the the pain is related to her diabetes meatus.      ASSESSMENT:   Compared to patient's previous audiogram dated 07/05/2023, hearing has been stable in the left and improved 10-30 dB across from all frequencies. Today s results were discussed with the patient in detail.     PLAN:  Patient was counseled regarding hearing loss and impact on communication. It is recommended that the patient return to the clinic for a hearing aid check every 6-9 months or sooner if new concerns arise. Return to the clinic to monitor hearing loss or sooner if new concerns arise.  Please call this clinic with  questions regarding these results or recommendations.    LUC HernándezA  Audiology Doctoral Extern  [place] #[id number]    I was present with the patient for the entire Audiology appointment, including all procedures/testing performed by the Ji student, and agree with the student s assessment and plan as documented.       Kusum Shukla, [facility]  Licensed Audiologist  [place] License #[id number]

## 2025-02-13 ENCOUNTER — LAB (OUTPATIENT)
Dept: LAB | Facility: CLINIC | Age: 80
End: 2025-02-13
Payer: COMMERCIAL

## 2025-02-13 DIAGNOSIS — Z79.4 TYPE 2 DIABETES MELLITUS WITH DIABETIC NEPHROPATHY, WITH LONG-TERM CURRENT USE OF INSULIN (H): ICD-10-CM

## 2025-02-13 DIAGNOSIS — E11.21 TYPE 2 DIABETES MELLITUS WITH DIABETIC NEPHROPATHY, WITH LONG-TERM CURRENT USE OF INSULIN (H): ICD-10-CM

## 2025-02-13 DIAGNOSIS — E78.5 HYPERLIPIDEMIA LDL GOAL <100: ICD-10-CM

## 2025-02-13 LAB
EST. AVERAGE GLUCOSE BLD GHB EST-MCNC: 180 MG/DL
HBA1C MFR BLD: 7.9 % (ref 0–5.6)

## 2025-02-17 ENCOUNTER — PATIENT OUTREACH (OUTPATIENT)
Dept: CARE COORDINATION | Facility: CLINIC | Age: 80
End: 2025-02-17
Payer: COMMERCIAL

## 2025-02-19 ENCOUNTER — PATIENT OUTREACH (OUTPATIENT)
Dept: CARE COORDINATION | Facility: CLINIC | Age: 80
End: 2025-02-19
Payer: COMMERCIAL

## 2025-02-24 ENCOUNTER — OFFICE VISIT (OUTPATIENT)
Dept: INTERNAL MEDICINE | Facility: CLINIC | Age: 80
End: 2025-02-24
Payer: COMMERCIAL

## 2025-02-24 VITALS
HEIGHT: 62 IN | SYSTOLIC BLOOD PRESSURE: 134 MMHG | BODY MASS INDEX: 35.43 KG/M2 | HEART RATE: 65 BPM | DIASTOLIC BLOOD PRESSURE: 68 MMHG | TEMPERATURE: 98.1 F | RESPIRATION RATE: 18 BRPM | WEIGHT: 192.5 LBS | OXYGEN SATURATION: 96 %

## 2025-02-24 DIAGNOSIS — E66.01 CLASS 2 SEVERE OBESITY WITH BODY MASS INDEX (BMI) OF 35 TO 39.9 WITH SERIOUS COMORBIDITY (H): ICD-10-CM

## 2025-02-24 DIAGNOSIS — E11.21 TYPE 2 DIABETES MELLITUS WITH DIABETIC NEPHROPATHY, WITH LONG-TERM CURRENT USE OF INSULIN (H): ICD-10-CM

## 2025-02-24 DIAGNOSIS — E66.812 CLASS 2 SEVERE OBESITY WITH BODY MASS INDEX (BMI) OF 35 TO 39.9 WITH SERIOUS COMORBIDITY (H): ICD-10-CM

## 2025-02-24 DIAGNOSIS — Z23 NEED FOR COVID-19 VACCINE: ICD-10-CM

## 2025-02-24 DIAGNOSIS — G89.29 CHRONIC RIGHT-SIDED LOW BACK PAIN WITHOUT SCIATICA: ICD-10-CM

## 2025-02-24 DIAGNOSIS — Z79.4 TYPE 2 DIABETES MELLITUS WITH DIABETIC NEPHROPATHY, WITH LONG-TERM CURRENT USE OF INSULIN (H): ICD-10-CM

## 2025-02-24 DIAGNOSIS — M54.50 CHRONIC RIGHT-SIDED LOW BACK PAIN WITHOUT SCIATICA: ICD-10-CM

## 2025-02-24 DIAGNOSIS — Z13.820 SCREENING FOR OSTEOPOROSIS: ICD-10-CM

## 2025-02-24 PROCEDURE — 91320 SARSCV2 VAC 30MCG TRS-SUC IM: CPT | Performed by: INTERNAL MEDICINE

## 2025-02-24 PROCEDURE — 90480 ADMN SARSCOV2 VAC 1/ONLY CMP: CPT | Performed by: INTERNAL MEDICINE

## 2025-02-24 PROCEDURE — 99214 OFFICE O/P EST MOD 30 MIN: CPT | Performed by: INTERNAL MEDICINE

## 2025-02-24 NOTE — PATIENT INSTRUCTIONS
Change Glipizide 5mg tab to lunchtime rather than breakfast   Check a sugar before supper/dinner on odd days and before bedtime on even days for the next 6 days and send me the results in Datanyze  Continue other medications  Call  [phone] or use Datanyze to schedule a future lab appointment  non-fasting in 3 months in A1C.    Stretching exercises in AM for low back   Covid vaccine  today   May consider RSV at pharmacy either in a few weeks  or later this Fall if prefer     Minocycline Pregnancy And Lactation Text: This medication is Pregnancy Category D and not consider safe during pregnancy. It is also excreted in breast milk. Include Pregnancy/Lactation Warning?: No Isotretinoin Pregnancy And Lactation Text: This medication is Pregnancy Category X and is considered extremely dangerous during pregnancy. It is unknown if it is excreted in breast milk. Doxycycline Pregnancy And Lactation Text: This medication is Pregnancy Category D and not consider safe during pregnancy. It is also excreted in breast milk but is considered safe for shorter treatment courses. Dapsone Counseling: I discussed with the patient the risks of dapsone including but not limited to hemolytic anemia, agranulocytosis, rashes, methemoglobinemia, kidney failure, peripheral neuropathy, headaches, GI upset, and liver toxicity.  Patients who start dapsone require monitoring including baseline LFTs and weekly CBCs for the first month, then every month thereafter.  The patient verbalized understanding of the proper use and possible adverse effects of dapsone.  All of the patient's questions and concerns were addressed. Bactrim Counseling:  I discussed with the patient the risks of sulfa antibiotics including but not limited to GI upset, allergic reaction, drug rash, diarrhea, dizziness, photosensitivity, and yeast infections.  Rarely, more serious reactions can occur including but not limited to aplastic anemia, agranulocytosis, methemoglobinemia, blood dyscrasias, liver or kidney failure, lung infiltrates or desquamative/blistering drug rashes. Topical Clindamycin Pregnancy And Lactation Text: This medication is Pregnancy Category B and is considered safe during pregnancy. It is unknown if it is excreted in breast milk. Topical Retinoid Pregnancy And Lactation Text: This medication is Pregnancy Category C. It is unknown if this medication is excreted in breast milk. Benzoyl Peroxide Counseling: Patient counseled that medicine may cause skin irritation and bleach clothing.  In the event of skin irritation, the patient was advised to reduce the amount of the drug applied or use it less frequently.   The patient verbalized understanding of the proper use and possible adverse effects of benzoyl peroxide.  All of the patient's questions and concerns were addressed. High Dose Vitamin A Counseling: Side effects reviewed, pt to contact office should one occur. Spironolactone Counseling: Patient advised regarding risks of diarrhea, abdominal pain, hyperkalemia, birth defects (for female patients), liver toxicity and renal toxicity. The patient may need blood work to monitor liver and kidney function and potassium levels while on therapy. The patient verbalized understanding of the proper use and possible adverse effects of spironolactone.  All of the patient's questions and concerns were addressed. Erythromycin Counseling:  I discussed with the patient the risks of erythromycin including but not limited to GI upset, allergic reaction, drug rash, diarrhea, increase in liver enzymes, and yeast infections. Detail Level: Zone Topical Sulfur Applications Counseling: Topical Sulfur Counseling: Patient counseled that this medication may cause skin irritation or allergic reactions.  In the event of skin irritation, the patient was advised to reduce the amount of the drug applied or use it less frequently.   The patient verbalized understanding of the proper use and possible adverse effects of topical sulfur application.  All of the patient's questions and concerns were addressed. Tazorac Counseling:  Patient advised that medication is irritating and drying.  Patient may need to apply sparingly and wash off after an hour before eventually leaving it on overnight.  The patient verbalized understanding of the proper use and possible adverse effects of tazorac.  All of the patient's questions and concerns were addressed. Bactrim Pregnancy And Lactation Text: This medication is Pregnancy Category D and is known to cause fetal risk.  It is also excreted in breast milk. Spironolactone Pregnancy And Lactation Text: This medication can cause feminization of the male fetus and should be avoided during pregnancy. The active metabolite is also found in breast milk. Erythromycin Pregnancy And Lactation Text: This medication is Pregnancy Category B and is considered safe during pregnancy. It is also excreted in breast milk. High Dose Vitamin A Pregnancy And Lactation Text: High dose vitamin A therapy is contraindicated during pregnancy and breast feeding. Dapsone Pregnancy And Lactation Text: This medication is Pregnancy Category C and is not considered safe during pregnancy or breast feeding. Birth Control Pills Counseling: Birth Control Pill Counseling: I discussed with the patient the potential side effects of OCPs including but not limited to increased risk of stroke, heart attack, thrombophlebitis, deep venous thrombosis, hepatic adenomas, breast changes, GI upset, headaches, and depression.  The patient verbalized understanding of the proper use and possible adverse effects of OCPs. All of the patient's questions and concerns were addressed. Azithromycin Counseling:  I discussed with the patient the risks of azithromycin including but not limited to GI upset, allergic reaction, drug rash, diarrhea, and yeast infections. Tazorac Pregnancy And Lactation Text: This medication is not safe during pregnancy. It is unknown if this medication is excreted in breast milk. Topical Sulfur Applications Pregnancy And Lactation Text: This medication is Pregnancy Category C and has an unknown safety profile during pregnancy. It is unknown if this topical medication is excreted in breast milk. Benzoyl Peroxide Pregnancy And Lactation Text: This medication is Pregnancy Category C. It is unknown if benzoyl peroxide is excreted in breast milk. Topical Retinoid counseling:  Patient advised to apply a pea-sized amount only at bedtime and wait 30 minutes after washing their face before applying.  If too drying, patient may add a non-comedogenic moisturizer. The patient verbalized understanding of the proper use and possible adverse effects of retinoids.  All of the patient's questions and concerns were addressed. Tetracycline Counseling: Patient counseled regarding possible photosensitivity and increased risk for sunburn.  Patient instructed to avoid sunlight, if possible.  When exposed to sunlight, patients should wear protective clothing, sunglasses, and sunscreen.  The patient was instructed to call the office immediately if the following severe adverse effects occur:  hearing changes, easy bruising/bleeding, severe headache, or vision changes.  The patient verbalized understanding of the proper use and possible adverse effects of tetracycline.  All of the patient's questions and concerns were addressed. Patient understands to avoid pregnancy while on therapy due to potential birth defects. Minocycline Counseling: Patient advised regarding possible photosensitivity and discoloration of the teeth, skin, lips, tongue and gums.  Patient instructed to avoid sunlight, if possible.  When exposed to sunlight, patients should wear protective clothing, sunglasses, and sunscreen.  The patient was instructed to call the office immediately if the following severe adverse effects occur:  hearing changes, easy bruising/bleeding, severe headache, or vision changes.  The patient verbalized understanding of the proper use and possible adverse effects of minocycline.  All of the patient's questions and concerns were addressed. Doxycycline Counseling:  Patient counseled regarding possible photosensitivity and increased risk for sunburn.  Patient instructed to avoid sunlight, if possible.  When exposed to sunlight, patients should wear protective clothing, sunglasses, and sunscreen.  The patient was instructed to call the office immediately if the following severe adverse effects occur:  hearing changes, easy bruising/bleeding, severe headache, or vision changes.  The patient verbalized understanding of the proper use and possible adverse effects of doxycycline.  All of the patient's questions and concerns were addressed. Isotretinoin Counseling: Patient should get monthly blood tests, not donate blood, not drive at night if vision affected, not share medication, and not undergo elective surgery for 6 months after tx completed. Side effects reviewed, pt to contact office should one occur. Topical Clindamycin Counseling: Patient counseled that this medication may cause skin irritation or allergic reactions.  In the event of skin irritation, the patient was advised to reduce the amount of the drug applied or use it less frequently.   The patient verbalized understanding of the proper use and possible adverse effects of clindamycin.  All of the patient's questions and concerns were addressed. Azithromycin Pregnancy And Lactation Text: This medication is considered safe during pregnancy and is also secreted in breast milk. Birth Control Pills Pregnancy And Lactation Text: This medication should be avoided if pregnant and for the first 30 days post-partum.

## 2025-02-24 NOTE — PROGRESS NOTES
ASSESSMENT:    1. Type 2 diabetes mellitus with diabetic nephropathy, with long-term current use of insulin (H)  Blood sugars controlled in the morning but rising above goal at bedtime.  Overall A1c 7.9 acceptable for age.  Will move glipizide to lunchtime rather than breakfast as patient occasionally gets slight shaky feeling with blood sugar at 80 before lunchtime.  Continue other medications and recheck A1c 3 months.  - Hemoglobin A1c; Future    2. Screening for osteoporosis  Has not had a bone density done previously.  Given menopausal state, will check DEXA  - DX Bone Density; Future    3. Class 2 severe obesity with body mass index (BMI) of 35 to 39.9 with serious comorbidity (H)  Weight up 3 pounds.  On Jardiance.  Deferred GLP-1 agonist due to cost issues.  Patient will continue improving diet and exercise    4. Chronic right-sided low back pain without sciatica  Stable.  Continue twice daily tramadol and gabapentin.  Discussed stretching exercises for patient to do in the morning. Pt had to leave appt right after covid vaccine as daughter had to get to work so will get updated CSA and UDS at follow-up appt in 6 months with use Tramadol.  [place] Need for COVID-19 vaccine (Primary)  Candidate for vaccination  - COVID-19 12+ (PFIZER)      PLAN:   Change Glipizide 5mg tab to lunchtime rather than breakfast   Check a sugar before supper/dinner on odd days and before bedtime on even days for the next 6 days and send me the results in XO1  Continue other medications  Call  [phone] or use XO1 to schedule a future lab appointment  non-fasting in 3 months in A1C.    Stretching exercises in AM for low back   Covid vaccine  today   May consider RSV at pharmacy either in a few weeks  or later this Fall if prefer             Subjective   Essie is a 79 year old, presenting for the following health issues:  Diabetes and Recheck Medication     History of Present Illness       Diabetes:   She  presents for follow up of diabetes.  She is checking home blood glucose two times daily.   She checks blood glucose before meals and at bedtime.  Blood glucose is sometimes over 200 and never under 70. She is aware of hypoglycemia symptoms including shakiness, dizziness, confusion and other.   She is concerned about low blood sugar, several less than 70 in the past few weeks and other.   She is having burning in feet and redness, sores, or blisters on feet.            Hyperlipidemia:  She presents for follow up of hyperlipidemia.   She is taking medication to lower cholesterol. She is not having myalgia or other side effects to statin medications.    Hypertension: She presents for follow up of hypertension.  She does not check blood pressure  regularly outside of the clinic. Outpatient blood pressures have not been over 140/90. She does not follow a low salt diet.     Reason for visit:  Diabetes checkup and other issues    She eats 2-3 servings of fruits and vegetables daily.She consumes 2 sweetened beverage(s) daily.She exercises with enough effort to increase her heart rate 9 or less minutes per day.  She exercises with enough effort to increase her heart rate 3 or less days per week.   She is taking medications regularly.       Most recent lab results reviewed with pt.      Component      Latest Ref Rng 8/12/2024  4:56 PM 9/16/2024  11:36 AM 11/11/2024  9:34 AM 2/13/2025  9:20 AM 2/13/2025  9:40 AM   Sodium      135 - 145 mmol/L 138  141   140     Potassium      3.4 - 5.3 mmol/L 4.4  5.0   4.9     Carbon Dioxide (CO2)      22 - 29 mmol/L 25  26   27     Anion Gap      7 - 15 mmol/L 12  9   11     Urea Nitrogen      8.0 - 23.0 mg/dL 15.8  13.2   25.9 (H)     Creatinine      0.51 - 0.95 mg/dL 0.88  0.74   0.74     GFR Estimate      >60 mL/min/1.73m2 67  82   82     Calcium      8.8 - 10.4 mg/dL 9.3  9.2   9.6     Chloride      98 - 107 mmol/L 101  106   102     Glucose      70 - 99 mg/dL 204 (H)  146 (H)   79    "  Alkaline Phosphatase      40 - 150 U/L    76     AST      0 - 45 U/L    18     ALT      0 - 50 U/L    15     Protein Total      6.4 - 8.3 g/dL    7.4     Albumin      3.5 - 5.2 g/dL    4.3     Bilirubin Total      <=1.2 mg/dL    0.4     Patient Fasting?    Yes     Patient Fasting?    Yes     Cholesterol      <200 mg/dL    144     Triglycerides      <150 mg/dL    84     HDL Cholesterol      >=50 mg/dL    68     LDL Cholesterol Calculated      <100 mg/dL    59     Non HDL Cholesterol      <130 mg/dL    76     Creatinine Urine      mg/dL     34.7    Albumin Urine mg/L      mg/L     <12.0    Albumin Urine mg/g Cr     --    Estimated Average Glucose      <117 mg/dL   192 (H)  180 (H)     Hemoglobin A1C      0.0 - 5.6 % 7.9 (H)   8.3 (H)  7.9 (H)        Patient's daughter.  Blood sugars in the morning range .  Bedtime sugars are often range 200-2 86.  Not checking before dinner/supper.  Recently seen in urgent care for yeast vaginitis.  Was treated with Diflucan single dose and symptoms have resolved.  Did not have to use clotrimazole antifungal cream.  Denies CP, SOB, abdominal pain, polyuria, polydipsia, vision changes  or skin problems.  Gets some tingling in median nerve distribution areas of the right hand.  Occasional radiation of pain into the forearm approximately.  Urgent care referred patient to [facility] for further management.  Due for COVID booster.  Wishes to defer RSV vaccination until Fall 2025 rather than getting it still late this RSV season in the next week through a pharmacy.  Chronic low back pain with some radiation from the back into the buttock area and occasionally into the hamstrings.  Patient gets occasional \"zapping\" pain in the buttock region.  No symptoms currently.  On long-term gabapentin and twice daily tramadol  Weight up 3 pounds.         Additional ROS:   Constitutional, HEENT, Cardiovascular, Pulmonary, GI and , Neuro, MSK and Psych review of systems/symptoms are " "otherwise negative or unchanged from previous, except as noted above.      OBJECTIVE:  /68   Pulse 65   Temp 98.1  F (36.7  C) (Temporal)   Resp 18   Ht 1.575 m (5' 2\")   Wt 87.3 kg (192 lb 8 oz)   SpO2 96%   BMI 35.21 kg/m     Estimated body mass index is 35.21 kg/m  as calculated from the following:    Height as of this encounter: 1.575 m (5' 2\").    Weight as of this encounter: 87.3 kg (192 lb 8 oz).      Neck: no adenopathy. Thyroid normal to palpation. No bruits  Pulm: Lungs clear to auscultation   CV: Regular rates and rhythm  GI: Soft, obese, nontender, Normal active bowel sounds, No hepatosplenomegaly or masses palpable  Ext: Peripheral pulses intact. No edema.  Neuro: Normal strength and tone, sensory exam grossly normal except for slight reduced light touch sensation right hand fingers 1-3.  Stable gait   MSK: Mild tenderness to palpation right paralumbar and buttock region.      (Chart documentation was completed, in part, with [employer] voice-recognition software. Even though reviewed, some grammatical, spelling, and word errors may remain.)    Luigi Driscoll MD  Internal Medicine Department  [facility]            "

## 2025-02-25 ENCOUNTER — PATIENT OUTREACH (OUTPATIENT)
Dept: CARE COORDINATION | Facility: CLINIC | Age: 80
End: 2025-02-25
Payer: COMMERCIAL

## 2025-03-04 DIAGNOSIS — I10 ESSENTIAL HYPERTENSION WITH GOAL BLOOD PRESSURE LESS THAN 130/80: ICD-10-CM

## 2025-03-05 DIAGNOSIS — M54.41 CHRONIC RIGHT-SIDED LOW BACK PAIN WITH RIGHT-SIDED SCIATICA: ICD-10-CM

## 2025-03-05 DIAGNOSIS — F11.90 CHRONIC, CONTINUOUS USE OF OPIOIDS: ICD-10-CM

## 2025-03-05 DIAGNOSIS — G89.29 CHRONIC RIGHT-SIDED LOW BACK PAIN WITH RIGHT-SIDED SCIATICA: ICD-10-CM

## 2025-03-05 RX ORDER — TRAMADOL HYDROCHLORIDE 50 MG/1
50 TABLET ORAL 2 TIMES DAILY
Qty: 60 TABLET | Refills: 5 | Status: SHIPPED | OUTPATIENT
Start: 2025-03-05

## 2025-03-05 RX ORDER — LOSARTAN POTASSIUM 100 MG/1
TABLET ORAL
Qty: 90 TABLET | Refills: 0 | Status: SHIPPED | OUTPATIENT
Start: 2025-03-05

## 2025-03-06 NOTE — TELEPHONE ENCOUNTER
Timing of RF appropriate.  UTD re: clinic visit/evisit (with current level of clinic overcrowded scheduling and stability with controlled substance prescription dosing use, patient being seen every 6 months). [place]  site [place] on file but neds updating and will have signed at follow-up appt Aug 2025. Rx electronically sent to pharmacy

## 2025-03-26 ENCOUNTER — OFFICE VISIT (OUTPATIENT)
Dept: INTERNAL MEDICINE | Facility: CLINIC | Age: 80
End: 2025-03-26
Payer: COMMERCIAL

## 2025-03-26 VITALS
OXYGEN SATURATION: 97 % | BODY MASS INDEX: 36.05 KG/M2 | TEMPERATURE: 97.1 F | DIASTOLIC BLOOD PRESSURE: 77 MMHG | HEART RATE: 68 BPM | RESPIRATION RATE: 18 BRPM | SYSTOLIC BLOOD PRESSURE: 155 MMHG | WEIGHT: 195.9 LBS | HEIGHT: 62 IN

## 2025-03-26 DIAGNOSIS — M20.41 HAMMERTOE OF RIGHT FOOT: ICD-10-CM

## 2025-03-26 DIAGNOSIS — Z79.4 TYPE 2 DIABETES MELLITUS WITH DIABETIC NEPHROPATHY, WITH LONG-TERM CURRENT USE OF INSULIN (H): ICD-10-CM

## 2025-03-26 DIAGNOSIS — L84 CALLUS OF FOOT: ICD-10-CM

## 2025-03-26 DIAGNOSIS — G52.1 GLOSSOPHARYNGEAL NEURALGIA SYNDROME: Primary | ICD-10-CM

## 2025-03-26 DIAGNOSIS — E11.21 TYPE 2 DIABETES MELLITUS WITH DIABETIC NEPHROPATHY, WITH LONG-TERM CURRENT USE OF INSULIN (H): ICD-10-CM

## 2025-03-26 PROCEDURE — 3077F SYST BP >= 140 MM HG: CPT | Performed by: INTERNAL MEDICINE

## 2025-03-26 PROCEDURE — 99214 OFFICE O/P EST MOD 30 MIN: CPT | Performed by: INTERNAL MEDICINE

## 2025-03-26 PROCEDURE — 3078F DIAST BP <80 MM HG: CPT | Performed by: INTERNAL MEDICINE

## 2025-03-26 RX ORDER — CARBAMAZEPINE 100 MG/1
100 TABLET, EXTENDED RELEASE ORAL 2 TIMES DAILY
Qty: 60 TABLET | Refills: 0 | Status: SHIPPED | OUTPATIENT
Start: 2025-03-26

## 2025-03-26 NOTE — PROGRESS NOTES
"  Assessment & Plan     Glossopharyngeal neuralgia syndrome  Her sx meet dx criteria for this-   Exclude structural lesion w/imaging  Start tegretol - titrate dose to get benefit  - carBAMazepine (TEGRETOL XR) 100 MG 12 hr tablet; Take 1 tablet (100 mg) by mouth 2 times daily.  - MR Brain w/o & w Contrast; Future  - CT Soft Tissue Neck w Contrast; Future    Hammertoe of right foot  Callus of foot  Type 2 diabetes mellitus with diabetic nephropathy, with long-term current use of insulin (H)  See podiatry given the hammertoe, callus, pain.  - Orthopedic  Referral; Future          BMI  Estimated body mass index is 35.83 kg/m  as calculated from the following:    Height as of this encounter: 1.575 m (5' 2\").    Weight as of this encounter: 88.9 kg (195 lb 14.4 oz).     See Patient Instructions    Subjective   Essie is a 80 year old, presenting for the following health issues:  Consult        HPI    Tongue pain, buccal and jaw pain - worse with swallowing, talking or chewing.  Jabs /\"zingers\" of pain     Toe issue on and off for a while- callus 3rd toe R foot that is quite painful.                Objective    BP (!) 155/77   Pulse 68   Temp 97.1  F (36.2  C) (Temporal)   Resp 18   Ht 1.575 m (5' 2\")   Wt 88.9 kg (195 lb 14.4 oz)   SpO2 97%   BMI 35.83 kg/m    Body mass index is 35.83 kg/m .  Physical Exam   GENERAL: alert and no distress  HENT: normal cephalic/atraumatic, ear canals and TM's normal, and nose and mouth without ulcers or lesions. Mouth tender along R side tongue, lower mandible. No LAD or salivary gland inflammation noted.   NECK: no adenopathy, no asymmetry, masses, or scars  Foot: hammertoes on R foot, 3rd toe with one and callus at end of toe - no ulcerations noted in this foot.   Nails thickened and long           Signed Electronically by: Ramses Shepherd MD    "

## 2025-03-27 ENCOUNTER — PATIENT OUTREACH (OUTPATIENT)
Dept: CARE COORDINATION | Facility: CLINIC | Age: 80
End: 2025-03-27
Payer: COMMERCIAL

## 2025-04-01 DIAGNOSIS — E78.5 HYPERLIPIDEMIA LDL GOAL <100: ICD-10-CM

## 2025-04-01 RX ORDER — SIMVASTATIN 20 MG
TABLET ORAL
Qty: 90 TABLET | Refills: 2 | Status: SHIPPED | OUTPATIENT
Start: 2025-04-01

## 2025-04-08 ENCOUNTER — OFFICE VISIT (OUTPATIENT)
Dept: PODIATRY | Facility: CLINIC | Age: 80
End: 2025-04-08
Payer: COMMERCIAL

## 2025-04-08 DIAGNOSIS — L84 CALLUS OF FOOT: ICD-10-CM

## 2025-04-08 DIAGNOSIS — L97.511 ULCER OF TOE OF RIGHT FOOT, LIMITED TO BREAKDOWN OF SKIN (H): Primary | ICD-10-CM

## 2025-04-08 DIAGNOSIS — L60.8 NAIL DEFORMITY: ICD-10-CM

## 2025-04-08 DIAGNOSIS — E11.21 TYPE 2 DIABETES MELLITUS WITH DIABETIC NEPHROPATHY, WITH LONG-TERM CURRENT USE OF INSULIN (H): ICD-10-CM

## 2025-04-08 DIAGNOSIS — M20.41 HAMMERTOE OF RIGHT FOOT: ICD-10-CM

## 2025-04-08 DIAGNOSIS — Z79.4 TYPE 2 DIABETES MELLITUS WITH DIABETIC NEPHROPATHY, WITH LONG-TERM CURRENT USE OF INSULIN (H): ICD-10-CM

## 2025-04-08 DIAGNOSIS — L60.2 NAIL HYPERTROPHY: ICD-10-CM

## 2025-04-08 NOTE — LETTER
4/8/2025      Essie Huddleston  [address]  [facility] [address]      Dear Colleague,    Thank you for referring your patient, Essie Huddleston, to the [facility]. Please see a copy of my visit note below.    ASSESSMENT:  Encounter Diagnoses   Name Primary?     Ulcer of toe of right foot, limited to breakdown of skin (H) Yes     Hammertoe of right foot      Callus of right third toe      Type 2 diabetes mellitus with diabetic nephropathy, with long-term current use of insulin (H)      Nail hypertrophy      Nail deformity        MEDICAL DECISION MAKING:  Essie is right third toe pain is related to a thick hyperkeratotic lesion with underlying ulceration.  Excisional debridement was performed -see below  We discussed the use of a toe elevator or a crest pad  With the hyperkeratotic tissue debrided the excision, anticipate pain will resolve and the small ulceration will heal.    They inquired about toenail debridement.  I explained the [place] policy and need for signing the ABN waiver form.  They were agreeable to this.    Using a nail nippers, all 10 toenails were debrided.  They were shortened.  Some of the thicker nails were skive down.  There was no bleeding.    She was provided a list of alternatives for future nail care.  Essie is referred for diabetic shoes and custom molded multidensity orthoses.  I have asked her to return in 3 to 4 weeks for recheck of the ulceration.      Debridement Procedure:   The purpose and goals of excisional debridement were discussed, including removing nonviable tissue, reducing risk of infection, and promoting healing.  Verbal consent was obtained.    Technique: excisional debridement    Depth of debridement: Excisional debridement was carried down to the depth of, and including, the deeper skin.    Location: Distal right third toe    Instrument(s) used: #15 scalpel     Wound Dimensions: 0.1 cm to 0.2 cm times depth to deeper skin    Type of  Wound: Neuropathic     Progress Statement: New wound.  No clinical signs of infection.  Superficial.    The procedure was tolerated well by the patient.  No anesthesia needed, due to peripheral neuropathy.  Bacitracin and a light dressing was applied.      Disclaimer: This note consists of symbols derived from keyboarding, dictation and/or voice recognition software. As a result, there may be errors in the script that have gone undetected. Please consider this when interpreting information found in this chart.    Geoff Del Valle, UDAY, FACFAS, MS    [place] [facility]le [facility]      ____________________________________________________________________    HPI:       I was asked by Dr. Shepherd to evaluate Essie Huddleston in consultation for a hammer toe of the right foot.       Essie Huddleston presents today with her daughter for a diabetic foot exam.  She is experiencing pain related to the right third toe.  Essie was last evaluated in podiatry in September 2020 by Dr. Teresa.  She and her daughter inquire about nail care.    Type 2 diabetes  Peripheral neuropathy  Last hemoglobin A1c 7.9%  *  Past Medical History:   Diagnosis Date     Bilateral carpal tunnel syndrome 04/2021    Per [facility]     Chronic right-sided low back pain with right-sided sciatica 12/29/2019     Chronic, continuous use of opioids 06/09/2021     Essential hypertension with goal blood pressure less than 130/80 12/29/2019     Hyperlipidemia LDL goal <100 06/20/2012     KIM (obstructive sleep apnea) 04/28/2023     Type 2 diabetes mellitus with diabetic nephropathy  (goal A1C<7) 10/24/2015     Vitamin D deficiency 06/20/2012   *  *No past surgical history on file.*  *  Current Outpatient Medications   Medication Sig Dispense Refill     amLODIPine (NORVASC) 5 MG tablet Take 1 tablet (5 mg) by mouth daily 90 tablet 3     aspirin 81 MG tablet Take 1 tablet (81 mg) by mouth daily 10 tablet 0     bisoprolol (ZEBETA) 5 MG tablet TAKE 1/2  TO 1 (ONE-HALF TO ONE) TABLET BY MOUTH ONCE DAILY 90 tablet 3     blood glucose (ACCU-CHEK GUIDE) test strip USE 1 STRIP TO CHECK GLUCOSE TWICE DAILY AS DIRECTED 200 strip 2     blood glucose (NO BRAND SPECIFIED) test strip Use to test blood sugar 3 times daily or as directed. 300 strip 3     blood glucose calibration (NO BRAND SPECIFIED) solution Use to calibrate blood glucose monitor as needed as directed. 1 Bottle 11     blood glucose monitoring (NO BRAND SPECIFIED) meter device kit Use to test blood sugar 3 times daily or as directed. 1 kit 0     blood glucose monitoring (NO BRAND SPECIFIED) meter device kit Use to test blood sugar 2 times daily or as directed. 1 kit 0     blood glucose monitoring (SOFTCLIX) lancets USE  TO CHECK GLUCOSE TWICE DAILY OR  AS  NEEDED 200 each 2     carBAMazepine (TEGRETOL XR) 100 MG 12 hr tablet Take 1 tablet (100 mg) by mouth 2 times daily. 60 tablet 0     celecoxib (CELEBREX) 100 MG capsule Take 1 capsule by mouth twice daily 180 capsule 3     clotrimazole (LOTRIMIN) 1 % vaginal cream Place 1 Applicatorful vaginally at bedtime. 45 g 0     COMPOUNDED NON-CONTROLLED SUBSTANCE (CMPD RX) - PHARMACY TO MIX COMPOUNDED MEDICATION Benzocaine 20%/lidocaine 10%/tetracaine 4% cream sig apply to aa 1 hour prior to procedure 60 g 3     empagliflozin (JARDIANCE) 25 MG TABS tablet Take 1 tablet (25 mg) by mouth daily 90 tablet 3     fluticasone (FLONASE) 50 MCG/ACT nasal spray USE 2 SPRAY(S) IN EACH NOSTRIL AT BEDTIME 48 g 3     gabapentin (NEURONTIN) 300 MG capsule TAKE 1 CAPSULE BY MOUTH THREE TIMES DAILY 270 capsule 3     glipiZIDE (GLUCOTROL) 5 MG tablet Take 1 tablet by mouth once a day in the morning with breakfast 90 tablet 3     insulin glargine (LANTUS PEN) 100 UNIT/ML pen Inject 28 Units subcutaneously at bedtime. 30 mL 3     insulin pen needle (B-D U/F) 31G X 8 MM miscellaneous USE 1  NEEDLE  DAILY WITH  INSULIN  INJECTION 100 each 2     Insulin Pen Needle 31G X 4 MM MISC 1 Needle  daily to use with insulin injection 100 each 3     losartan (COZAAR) 100 MG tablet Take 1 tablet by mouth once daily 90 tablet 0     metFORMIN (GLUCOPHAGE) 1000 MG tablet Take 1/2 tab in AM and 1 tab in  tablet 3     simvastatin (ZOCOR) 20 MG tablet TAKE 1 TABLET BY MOUTH AT BEDTIME 90 tablet 2     traMADol (ULTRAM) 50 MG tablet Take 1 tablet by mouth twice daily 60 tablet 5     triamcinolone (KENALOG) 0.1 % external cream APPLY CREAM TOPICALLY THREE TIMES DAILY           EXAM:    Vitals: There were no vitals taken for this visit.  BMI: There is no height or weight on file to calculate BMI.    Diabetic Foot Exam (details below):    Vasc:      Pedal pulses are palpable for the dorsalis pedis posterior tibial artery, bilateral foot.  Capillary fill time </= 3 seconds  Pedal skin appears well-perfused  Neuro:      Decreased protective sensation per monofilament testing, both feet.  Nicole  Light touch sensation intact to all sensory nerve distributions, bilateral foot.  No apparent spastic contractures or other deformity secondary to neurologic compromise.  Derm:      Hyperkeratotic lesion with hyperpigmentation, distal right third toe.  Pain on palpation to this region.  No associated erythema or edema.  After excisional debridement, there is a 0.1 to 0.2 cm diameter ulceration to the depth of deeper skin.  All nails are elongated.  Multiple nails thickened and deformed.  No wounds   No worrisome lesions  MSK:      Digital contractures including the right third toe.  This toe is semirigid.  Bilateral lower extremity muscle strength presents is normal.  no gross deformities  Adequate ankle and subtalar joint range of motion  Calf:    Neg for redness, swelling or tenderness      Again, thank you for allowing me to participate in the care of your patient.        Sincerely,        Geoff Del Valle DPM    Electronically signed

## 2025-04-08 NOTE — PATIENT INSTRUCTIONS
"Thank you for choosing [facility] Podiatry / Foot & Ankle Surgery!    DR. HAMMER'S CLINIC LOCATIONS:     [facility] TRIAGE LINE: [phone]   [address] APPOINTMENTS: [phone]   [place] [address] RADIOLOGY: [phone]   (Every other Tues - Wed - Fri PM) SET UP SURGERY: [phone]    PHYSICAL THERAPY: [phone]   [place] SPECIALTY BILLING QUESTIONS: [phone] [address] FAX: [phone]   [place] [address]    (Thurs & Fri AM)        Follow up 3 weeks    DIABETES AND YOUR FEET  Diabetes can result in several problems in the feet including ulcers (open sores) and amputations. Two of the most important reasons why people develop foot problems when they have diabetes is : 1. Neuropathy (loss of feeling)  2. Vascular disease (loss or decrease of blood flow).    Neuropathy is a term used to describe a loss of nerve function.  Patients with diabetes are at risk of developing neuropathy if their sugars continue to run high and are above the normal value. One theory for neuropathy is that the \"extra\" sugar in the body enters the nerves and is broken down. These by-products build up in the nerve causing it to swell and impairing nerve function. Often times, this can be prevented by controlling your sugars, dieting and exercise.    When a person develops neuropathy, they usually begin to feel numbness or tingling in their feet and sometime in their legs.  Other symptoms may include painful burning or hot feet, tingling or feeling like insects or ants are crawling on your feet or legs.  If the diabetes is sever and the sugars run high for long periods of time, neuropathy can also occur in the hands.    Vascular disease  is a term used to describe a loss or decrease in circulation (blood flow). There is a problem in getting blood and oxygen to areas that need it. Similar to neuropathy, sugars can build up in the walls of the arteries (blood vessels) and cause them to " become swollen, thickened and hardened. This decreases the amount of blood that can go to an area that needs it. Though this is common in the legs of diabetic patients, it can also affect other arteries (blood vessels) in the body such as in the heart and eyes.    In the legs, vascular disease usually results in cramping. Patients who develop leg cramps after walking the same distance every time (i.e. One block, half a mile, ect.) need to let their doctors know so that their circulation may be checked. Cramps causing severe pain in the feet and/or legs while sleeping and the cramps go away when you stand or hang your legs off the side of the bed, may also be a sign of poor blood circulation.  Occasional cramping in cold weather or on rare occasions with activity may not be due to poor circulation, but you should inform your doctor.    PREVENTION OF THESE DISEASES  The key to prevention is good blood sugar control. Poor blood sugar control is a big reason many of these problems start. Physical activity (exercise) is a very good way to help decrease your blood sugars. Exercise can lower your blood sugar, blood pressure, and cholesterol. It also reduces your risk for heart disease and stroke, relieves stress, and strengthens your heart, muscles and bones.  In addition, regular activity helps insulin work better, improves your blood circulation, and keeps your joints flexible. If you're trying to lose weight, a combination of exercise and wise food choices can help you reach your target weight and maintain it.      PAIN MANAGEMENT (**Please speak with your primary doctor about any medications**)  1.Blood Sugar Control - Most important  2. Medications such as:  Amytriptylline, duloxetine, gabapentin, lyrica, tramadol (talk with your primary care doctor about this).     NUTRITION:  Nutrition is also important to help with healing. If your body does not have what it needs, it can't heal.   Increasing your protein intake is  "important.  With wounds you need 60-90gm of protein a day to help with healing. Over the counter protein shakes such as Konstantin, Glucerna, Ensure, ect... can help to supplement your daily protein intake.   It is also important to take Vitamins to help with healing.  Vitamins such as B12, B6 and Vitamin D3 are important for healing. These can be gotten over the counter at pharmacies or at stores like [facility] or the [employer].    I can also prescribe a dietary supplement called \"Rheumate\" that has a lot of essential vitamins in one capsule.  This may not be covered by insurance though.     FOOT CARE RECOMMENDATIONS   1. Wash your feet with lukewarm water and a mild soap and then dry them thoroughly, especially between the toes.     2. Examine your feet daily looking for cuts, corns, blisters, cracks, ect, especially after wearing new shoes. Make sure to look between your toes. If you cannot see the bottom of your feet, set a mirror on the floor and hold your foot over it, or ask a spouse, friend or family member to examine your feet for you. Contact your doctor immediately if new problems are noted or if sores are not healing.     3. Immediately apply moisturizer to the tops and bottoms of your feet, avoiding areas between the toes. Hand lotion (Intesive Care, Mora, Eucerin, Neutrogena, Curel, ect) is sufficient unless your doctor prescribes a medicated lotion. Apply sunscreen to your feet when going swimming outside.     4. Use clean comfortable shoes, wear white socks (if you have any bleeding or drainage, you will see it on white socks). Socks should not have thick seams or cut off the circulation around the leg. Break in new shoes slowly and rotate with older shoes until broken in. Check the inside of your shoes with your hand to look for areas of irritation or objects that may have fallen into your shoes.       5. Keep slippers by the side of your bed for use during the night.     6.  Shoes should be fitted by a " professional and should not cause areas of irritation.  Check your feet regularly when wearing a new pair of shoes and replace them as needed.     7.  Talk to your doctor about proper exercise. Exercise and stretching stimulate blood flow to your feet and maintain proper glucose levels.     8.  Monitor your blood glucose level as instructed by your doctor. Notify your doctor immediately if your blood sugar is abnormally high or low.    9. Cut your nails straight across, but then gently round any sharp edges with a cardboard nail file. If you have neuropathy, peripheral vascular disease or cannot see that well to trim your own toenails contact [facility] ([phone]) or [facility] ([phone]).      THINGS TO AVOID DOING   1.  Do not soak your feet if you have an open sore. Use only lukewarm water and always check the temperature with your hand as hot water can easily burn your feet.       2.  Never use a hot water bottle or heating pad on your feet. Also do not apply cold compresses to your feet. With decreased sensation, you could burn or freeze your feet.       3.  Do not apply any of these to your feet:    -  Over the counter medicine for corns or warts    -  Harsh chemicals like boric acid    -  Do not self-treat corns, cuts, blisters or infections. Always consult your doctor.       4.  Do not wear sandals, slippers or walk barefoot, especially on hot sand or concrete or other harsh surfaces.     5.  If you smoke, stop!!!      CALLUS / CORNS / IPKs  When there is excessive friction or pressure on the skin, the body responds by making the skin thicker to protect the deeper structures from becoming exposed. While this works well to protect the deeper structures, the thickened skin can increase pressure and pain.    CALLUS: Flat, diffuse thickening are simple calluses and they are usually caused by friction. Often these are the result of rubbing on a shoe or going barefoot.    CORNS: Calluses with a central  core between the toes are called corns. These result from prominent joints on adjacent toes rubbing together. Theses are a symptom of bone malalignment and will always recur unless the underlying bones are addressed surgically.    IPKs: Calluses with a central core on the ball of the foot are usually IPKs (intractable plantar keratosis). These are caused by excessive pressure from the metatarsals, the bones that make up the ball of the foot. Often one of these bones is too long or too prominent.  Again, these will always recur unless the underlying bone issue is addressed. There is no cure for these. They will either go away by themselves, recur, or more could develop.    ROUTINE MAINTENANCE  1. File them down with a pumice stone or callus file a couple times a week.   2. An electric callus removing device. Amope Pedi Perfect Electronic Pedicure Foot File and Callus Remover can be a good option.   3. Lotion can be applied to soften the callus. A urea based cream such as Kersal or Vanicream or thicker cream with shea butter are good options.  4. Toe spacers or toe covers can be used for corns, gel pads can be used for other lesions on the bottom of the foot.   If there is a surgical pathology noted, such as a prominent bone, often this needs to be addressed surgically to minimize recurrence. However, sometimes the lesion simply migrates to another spot after surgery, so it is not a guaranteed cure.     **If you come back to clinic for treatment, insurance does not cover it, and you would be billed. This charge could range from $100 - $227**    HAMMERTOES  Hammertoe is a contracture (bending) of one or both joints of the second, third, fourth, or fifth (little) toes. This abnormal bending can put pressure on the toe when wearing shoes, causing problems to develop.  Hammertoes usually start out as mild deformities and get progressively worse over time. In the earlier stages, hammertoes are flexible and the symptoms can  often be managed with noninvasive measures. But if left untreated, hammertoes can become more rigid and will not respond to non-surgical treatment.  Because of the progressive nature of hammertoes, they should receive early attention. Hammertoes never get better without some kind of intervention.  CAUSES  The most common cause of hammertoe is a muscle/tendon imbalance. This imbalance, which leads to a bending of the toe, results from mechanical (structural) changes in the foot that occur over time in some people.  Hammertoes may be aggravated by shoes that don t fit properly. A hammertoe may result if a toe is too long and is forced into a cramped position when a tight shoe is worn.  Occasionally, hammertoe is the result of an earlier trauma to the toe. In some people, hammertoes are inherited.  SYMPTOMS  Pain or irritation of the affected toe when wearing shoes.   Corns and calluses (a buildup of skin) on the toe, between two toes, or on the ball of the foot. Corns are caused by constant friction against the shoe. They may be soft or hard, depending upon their location.   Inflammation, redness, or a burning sensation   Contracture of the toe   In more severe cases of hammertoe, open sores may form.   DIAGNOSIS  Although hammertoes are readily apparent, to arrive at a diagnosis the foot and ankle surgeon will obtain a thorough history of your symptoms and examine your foot. During the physical examination, the doctor may attempt to reproduce your symptoms by manipulating your foot and will study the contractures of the toes. In addition, the foot and ankle surgeon may take x-rays to determine the degree of the deformities and assess any changes that may have occurred.   Hammertoes are progressive - they don t go away by themselves and usually they will get worse over time. However, not all cases are alike - some hammertoes progress more rapidly than others. Once your foot and ankle surgeon has evaluated your  hammertoes, a treatment plan can be developed that is suited to your needs.  NON-SURGICAL TREATMENT  There is a variety of treatment options for hammertoe. The treatment your foot and ankle surgeon selects will depend upon the severity of your hammertoe and other factors.  A number of non-surgical measures can be undertaken:  Padding corns and calluses. Your foot and ankle surgeon can provide or prescribe pads designed to shield corns from irritation. If you want to try over-the-counter pads, avoid the medicated types. Medicated pads are generally not recommended because they may contain a small amount of acid that can be harmful. Consult your surgeon about this option.   Changes in shoewear. Avoid shoes with pointed toes, shoes that are too short, or shoes with high heels - conditions that can force your toe against the front of the shoe. Instead, choose comfortable shoes with a deep, roomy toe box and heels no higher than two inches.   Orthotic devices. A custom orthotic device placed in your shoe may help control the muscle/tendon imbalance.   Injection therapy. Corticosteroid injections are sometimes used to ease pain and inflammation caused by hammertoe.   Medications. Oral nonsteroidal anti-inflammatory drugs (NSAIDs), such as ibuprofen, may be recommended to reduce pain and inflammation.   Splinting/strapping. Splints or small straps may be applied by the surgeon to realign the bent toe.   Exercises:   1. The Toe Tap  Stand flat on the ground in your bare feet. Raise all of your toes up off the ground as high as you can. Then starting with the little toes, slowly press them down to the ground. After the big toes are on the ground, start over by raising all of them up off the ground again. This motion is similar to tapping your fingers on a desk. Repeat this ten times.     2. Interlocking your Fingers and Toes  Cross your right foot over your knee and place the fingers of your left hand between your toes.  Squeeze your toes together, pinching your fingers between them. Spread the toes apart and squeeze them together again. Repeat this ten times then do the other foot. Like most exercises, this will get easier the more you do it. If you are having a lot of difficulty with this exercise, start with just your index finger between your first and second toe, then later add your middle finger between your second and third toes, and so on until you can fit all your fingers between your toes. Do this ten times on each foot. Eventually you will be able to spread your toes apart without using your fingers.    3. Gripping the Floor   the floor by pressing the pads of your toes (not the tips) into the floor without curling your toes. Relax and repeat this ten times. If your shoes have the proper amount of depth, you should be able to do this with shoes on.    HAMMERTOE TOE SURGERY   Hammertoe surgery is complex. The surgical procedure is an attempt to help the toe lay in a better position. Nearly every structure in the toe will be cut including the tendons, ligaments, skin and bone. Hammertoes are a complex deformity and final toe position is difficult to predict. The only sure way to position a toe is to fuse all three digital joints. That will not happen as some degree of toe motion is needed for walking. The toe may not be completely reduced as the surrounding skin and other structures may not allow the toe to return to a normal position. The tendons on adjacent toes may need to be cut at the time of the original or subsequent surgeries, as interconnections exist between the toes. The toe may drift after surgery. Stiffness may develop leading to new areas of pressure.   Future shoe choices will be critical in allowing the surgery to provide comfort. The toes will still hurt if shoes rub. The original pain may also persist as other foot problems may be contributing to the current pain. The toe may or may not be pinned in  place. External pins would require complete avoidance of water on the foot for six weeks. The pin would be removed about six weeks after the surgery. Strict attention to protection is critical. The pin could get bumped or loosen resulting in early removal. Removal might be necessary before the bone heals which would negatively affect the final surgical outcome and toe allignment.     Here is a list of routine foot care resources, which includes toenail trimming and callus/corn management.     This is not a referral. It is your responsibility to contact the organization and your insurance to confirm cost and coverage.      ROUTINE FOOT CARE (NAIL TRIMMING / CALLUSES)      [facility]  [phone]   [facility]  [phone]  Multiple locations   [facility]  [phone] Dr. Baker and Dr. Ramirez  [address]  [place] [address] [phone]   [facility]  [phone]  [facility]        [facility] LOCATIONS  [facility]  [address]  [place] [address]  Phone: [phone]  Fax: [phone] [facility]   [address]  [place] [address]  Phone: [phone]  Fax: [phone]   [facility]  [address]  [place] [address]  Phone: [phone]  Fax: [phone] [facility]  [address]  [place] [address]  Phone: [phone]   Fax: [phone]   * Please call any location listed to make an appointment for a casting/fitting. Your referral was sent to their central office and they will all have the order on file.

## 2025-04-08 NOTE — PROGRESS NOTES
ASSESSMENT:  Encounter Diagnoses   Name Primary?    Ulcer of toe of right foot, limited to breakdown of skin (H) Yes    Hammertoe of right foot     Callus of right third toe     Type 2 diabetes mellitus with diabetic nephropathy, with long-term current use of insulin (H)     Nail hypertrophy     Nail deformity        MEDICAL DECISION MAKING:  Essie is right third toe pain is related to a thick hyperkeratotic lesion with underlying ulceration.  Excisional debridement was performed -see below  We discussed the use of a toe elevator or a crest pad  With the hyperkeratotic tissue debrided the excision, anticipate pain will resolve and the small ulceration will heal.    They inquired about toenail debridement.  I explained the [place] policy and need for signing the ABN waiver form.  They were agreeable to this.    Using a nail nippers, all 10 toenails were debrided.  They were shortened.  Some of the thicker nails were skive down.  There was no bleeding.    She was provided a list of alternatives for future nail care.  Essie is referred for diabetic shoes and custom molded multidensity orthoses.  I have asked her to return in 3 to 4 weeks for recheck of the ulceration.      Debridement Procedure:   The purpose and goals of excisional debridement were discussed, including removing nonviable tissue, reducing risk of infection, and promoting healing.  Verbal consent was obtained.    Technique: excisional debridement    Depth of debridement: Excisional debridement was carried down to the depth of, and including, the deeper skin.    Location: Distal right third toe    Instrument(s) used: #15 scalpel     Wound Dimensions: 0.1 cm to 0.2 cm times depth to deeper skin    Type of Wound: Neuropathic     Progress Statement: New wound.  No clinical signs of infection.  Superficial.    The procedure was tolerated well by the patient.  No anesthesia needed, due to peripheral neuropathy.  Bacitracin and a light dressing was  applied.      Disclaimer: This note consists of symbols derived from keyboarding, dictation and/or voice recognition software. As a result, there may be errors in the script that have gone undetected. Please consider this when interpreting information found in this chart.    Geoff Del Valle DPM, FACFAS, MS    [facility]le [facility]      ____________________________________________________________________    HPI:       I was asked by Dr. Shepherd to evaluate Essie Huddleston in consultation for a hammer toe of the right foot.       Essie Huddleston presents today with her daughter for a diabetic foot exam.  She is experiencing pain related to the right third toe.  Essie was last evaluated in podiatry in September 2020 by Dr. Teresa.  She and her daughter inquire about nail care.    Type 2 diabetes  Peripheral neuropathy  Last hemoglobin A1c 7.9%  *  Past Medical History:   Diagnosis Date    Bilateral carpal tunnel syndrome 04/2021    Per [facility]    Chronic right-sided low back pain with right-sided sciatica 12/29/2019    Chronic, continuous use of opioids 06/09/2021    Essential hypertension with goal blood pressure less than 130/80 12/29/2019    Hyperlipidemia LDL goal <100 06/20/2012    KIM (obstructive sleep apnea) 04/28/2023    Type 2 diabetes mellitus with diabetic nephropathy  (goal A1C<7) 10/24/2015    Vitamin D deficiency 06/20/2012   *  *No past surgical history on file.*  *  Current Outpatient Medications   Medication Sig Dispense Refill    amLODIPine (NORVASC) 5 MG tablet Take 1 tablet (5 mg) by mouth daily 90 tablet 3    aspirin 81 MG tablet Take 1 tablet (81 mg) by mouth daily 10 tablet 0    bisoprolol (ZEBETA) 5 MG tablet TAKE 1/2 TO 1 (ONE-HALF TO ONE) TABLET BY MOUTH ONCE DAILY 90 tablet 3    blood glucose (ACCU-CHEK GUIDE) test strip USE 1 STRIP TO CHECK GLUCOSE TWICE DAILY AS DIRECTED 200 strip 2    blood glucose (NO BRAND SPECIFIED) test strip Use to test blood sugar 3 times  daily or as directed. 300 strip 3    blood glucose calibration (NO BRAND SPECIFIED) solution Use to calibrate blood glucose monitor as needed as directed. 1 Bottle 11    blood glucose monitoring (NO BRAND SPECIFIED) meter device kit Use to test blood sugar 3 times daily or as directed. 1 kit 0    blood glucose monitoring (NO BRAND SPECIFIED) meter device kit Use to test blood sugar 2 times daily or as directed. 1 kit 0    blood glucose monitoring (SOFTCLIX) lancets USE  TO CHECK GLUCOSE TWICE DAILY OR  AS  NEEDED 200 each 2    carBAMazepine (TEGRETOL XR) 100 MG 12 hr tablet Take 1 tablet (100 mg) by mouth 2 times daily. 60 tablet 0    celecoxib (CELEBREX) 100 MG capsule Take 1 capsule by mouth twice daily 180 capsule 3    clotrimazole (LOTRIMIN) 1 % vaginal cream Place 1 Applicatorful vaginally at bedtime. 45 g 0    COMPOUNDED NON-CONTROLLED SUBSTANCE (CMPD RX) - PHARMACY TO MIX COMPOUNDED MEDICATION Benzocaine 20%/lidocaine 10%/tetracaine 4% cream sig apply to aa 1 hour prior to procedure 60 g 3    empagliflozin (JARDIANCE) 25 MG TABS tablet Take 1 tablet (25 mg) by mouth daily 90 tablet 3    fluticasone (FLONASE) 50 MCG/ACT nasal spray USE 2 SPRAY(S) IN EACH NOSTRIL AT BEDTIME 48 g 3    gabapentin (NEURONTIN) 300 MG capsule TAKE 1 CAPSULE BY MOUTH THREE TIMES DAILY 270 capsule 3    glipiZIDE (GLUCOTROL) 5 MG tablet Take 1 tablet by mouth once a day in the morning with breakfast 90 tablet 3    insulin glargine (LANTUS PEN) 100 UNIT/ML pen Inject 28 Units subcutaneously at bedtime. 30 mL 3    insulin pen needle (B-D U/F) 31G X 8 MM miscellaneous USE 1  NEEDLE  DAILY WITH  INSULIN  INJECTION 100 each 2    Insulin Pen Needle 31G X 4 MM MISC 1 Needle daily to use with insulin injection 100 each 3    losartan (COZAAR) 100 MG tablet Take 1 tablet by mouth once daily 90 tablet 0    metFORMIN (GLUCOPHAGE) 1000 MG tablet Take 1/2 tab in AM and 1 tab in  tablet 3    simvastatin (ZOCOR) 20 MG tablet TAKE 1 TABLET BY  MOUTH AT BEDTIME 90 tablet 2    traMADol (ULTRAM) 50 MG tablet Take 1 tablet by mouth twice daily 60 tablet 5    triamcinolone (KENALOG) 0.1 % external cream APPLY CREAM TOPICALLY THREE TIMES DAILY           EXAM:    Vitals: There were no vitals taken for this visit.  BMI: There is no height or weight on file to calculate BMI.    Diabetic Foot Exam (details below):    Vasc:      Pedal pulses are palpable for the dorsalis pedis posterior tibial artery, bilateral foot.  Capillary fill time </= 3 seconds  Pedal skin appears well-perfused  Neuro:      Decreased protective sensation per monofilament testing, both feet.  Nicole  Light touch sensation intact to all sensory nerve distributions, bilateral foot.  No apparent spastic contractures or other deformity secondary to neurologic compromise.  Derm:      Hyperkeratotic lesion with hyperpigmentation, distal right third toe.  Pain on palpation to this region.  No associated erythema or edema.  After excisional debridement, there is a 0.1 to 0.2 cm diameter ulceration to the depth of deeper skin.  All nails are elongated.  Multiple nails thickened and deformed.  No wounds   No worrisome lesions  MSK:      Digital contractures including the right third toe.  This toe is semirigid.  Bilateral lower extremity muscle strength presents is normal.  no gross deformities  Adequate ankle and subtalar joint range of motion  Calf:    Neg for redness, swelling or tenderness     Xolair Counseling:  Patient informed of potential adverse effects including but not limited to fever, muscle aches, rash and allergic reactions.  The patient verbalized understanding of the proper use and possible adverse effects of Xolair.  All of the patient's questions and concerns were addressed.

## 2025-04-09 DIAGNOSIS — G89.29 CHRONIC RIGHT-SIDED LOW BACK PAIN WITH RIGHT-SIDED SCIATICA: ICD-10-CM

## 2025-04-09 DIAGNOSIS — M54.41 CHRONIC RIGHT-SIDED LOW BACK PAIN WITH RIGHT-SIDED SCIATICA: ICD-10-CM

## 2025-04-10 ENCOUNTER — ANCILLARY PROCEDURE (OUTPATIENT)
Dept: MRI IMAGING | Facility: CLINIC | Age: 80
End: 2025-04-10
Attending: INTERNAL MEDICINE
Payer: COMMERCIAL

## 2025-04-10 ENCOUNTER — ANCILLARY PROCEDURE (OUTPATIENT)
Dept: CT IMAGING | Facility: CLINIC | Age: 80
End: 2025-04-10
Attending: INTERNAL MEDICINE
Payer: COMMERCIAL

## 2025-04-10 DIAGNOSIS — G52.1 GLOSSOPHARYNGEAL NEURALGIA SYNDROME: ICD-10-CM

## 2025-04-10 LAB
CREAT BLD-MCNC: 1 MG/DL (ref 0.5–1)
EGFRCR SERPLBLD CKD-EPI 2021: 57 ML/MIN/1.73M2

## 2025-04-10 PROCEDURE — 250N000009 HC RX 250: Performed by: INTERNAL MEDICINE

## 2025-04-10 PROCEDURE — 250N000011 HC RX IP 250 OP 636: Performed by: INTERNAL MEDICINE

## 2025-04-10 PROCEDURE — 70491 CT SOFT TISSUE NECK W/DYE: CPT

## 2025-04-10 PROCEDURE — 70553 MRI BRAIN STEM W/O & W/DYE: CPT

## 2025-04-10 PROCEDURE — 82565 ASSAY OF CREATININE: CPT

## 2025-04-10 PROCEDURE — A9585 GADOBUTROL INJECTION: HCPCS | Performed by: INTERNAL MEDICINE

## 2025-04-10 PROCEDURE — 255N000002 HC RX 255 OP 636: Performed by: INTERNAL MEDICINE

## 2025-04-10 RX ORDER — GADOBUTROL 604.72 MG/ML
9 INJECTION INTRAVENOUS ONCE
Status: COMPLETED | OUTPATIENT
Start: 2025-04-10 | End: 2025-04-10

## 2025-04-10 RX ORDER — GABAPENTIN 300 MG/1
300 CAPSULE ORAL 3 TIMES DAILY
Qty: 270 CAPSULE | Refills: 3 | Status: SHIPPED | OUTPATIENT
Start: 2025-04-10

## 2025-04-10 RX ORDER — IOPAMIDOL 755 MG/ML
90 INJECTION, SOLUTION INTRAVASCULAR ONCE
Status: COMPLETED | OUTPATIENT
Start: 2025-04-10 | End: 2025-04-10

## 2025-04-10 RX ADMIN — SODIUM CHLORIDE 40 ML: 9 INJECTION, SOLUTION INTRAVENOUS at 11:54

## 2025-04-10 RX ADMIN — IOPAMIDOL 90 ML: 755 INJECTION, SOLUTION INTRAVENOUS at 11:54

## 2025-04-10 RX ADMIN — GADOBUTROL 9 ML: 604.72 INJECTION INTRAVENOUS at 11:04

## 2025-04-16 DIAGNOSIS — M54.41 CHRONIC RIGHT-SIDED LOW BACK PAIN WITH RIGHT-SIDED SCIATICA: ICD-10-CM

## 2025-04-16 DIAGNOSIS — G89.29 CHRONIC RIGHT-SIDED LOW BACK PAIN WITH RIGHT-SIDED SCIATICA: ICD-10-CM

## 2025-04-16 RX ORDER — CELECOXIB 100 MG/1
CAPSULE ORAL
Qty: 180 CAPSULE | Refills: 1 | Status: SHIPPED | OUTPATIENT
Start: 2025-04-16

## 2025-06-04 DIAGNOSIS — I10 ESSENTIAL HYPERTENSION WITH GOAL BLOOD PRESSURE LESS THAN 130/80: ICD-10-CM

## 2025-06-04 RX ORDER — LOSARTAN POTASSIUM 100 MG/1
100 TABLET ORAL DAILY
Qty: 90 TABLET | Refills: 1 | Status: SHIPPED | OUTPATIENT
Start: 2025-06-04

## 2025-06-05 ENCOUNTER — TELEPHONE (OUTPATIENT)
Dept: INTERNAL MEDICINE | Facility: CLINIC | Age: 80
End: 2025-06-05
Payer: COMMERCIAL

## 2025-06-05 NOTE — TELEPHONE ENCOUNTER
Daughter (C2C on file) calling to inform that the patient will be traveling 6/24-7/9. Patient is needing medication for the time she is gone. Informed the daughter to contact the pharmacy to inform for need of vacation override if need to refill early. Patient's medications have refills remaining. Catherine Cameron RN

## 2025-06-09 ENCOUNTER — TELEPHONE (OUTPATIENT)
Dept: AUDIOLOGY | Facility: CLINIC | Age: 80
End: 2025-06-09
Payer: COMMERCIAL

## 2025-06-09 NOTE — TELEPHONE ENCOUNTER
Left hearing aid will be replaced under L&D and patient's daughter would like to self pay for right CROS, as this was previously replaced under L&D (does not want to wait to try prior authorization)    Ji Razo, [facility]  Licensed Audiologist  [place] #[id number]    [facility]    Phone Message    May a detailed message be left on voicemail: yes     Reason for Call: Other: Daughter, is requesting to speak with care team regarding replacing her mother's HA. Per daughter, her mother misplaced them. Pt has a trip in 2 weeks and would like them replaced as soon as possible.       Please call daughter at [phone] to advise. Did confirm phone number and insurance. Thanks!      Action Taken: Message routed to:  [facility] AUDIO    Travel Screening: Not Applicable

## 2025-06-18 ENCOUNTER — TELEPHONE (OUTPATIENT)
Dept: AUDIOLOGY | Facility: CLINIC | Age: 80
End: 2025-06-18
Payer: COMMERCIAL

## 2025-07-13 ENCOUNTER — HEALTH MAINTENANCE LETTER (OUTPATIENT)
Age: 80
End: 2025-07-13

## 2025-08-06 ENCOUNTER — LAB (OUTPATIENT)
Dept: LAB | Facility: CLINIC | Age: 80
End: 2025-08-06
Payer: COMMERCIAL

## 2025-08-06 DIAGNOSIS — E11.21 TYPE 2 DIABETES MELLITUS WITH DIABETIC NEPHROPATHY, WITH LONG-TERM CURRENT USE OF INSULIN (H): ICD-10-CM

## 2025-08-06 DIAGNOSIS — Z79.4 TYPE 2 DIABETES MELLITUS WITH DIABETIC NEPHROPATHY, WITH LONG-TERM CURRENT USE OF INSULIN (H): ICD-10-CM

## 2025-08-06 DIAGNOSIS — I10 ESSENTIAL HYPERTENSION WITH GOAL BLOOD PRESSURE LESS THAN 130/80: ICD-10-CM

## 2025-08-06 LAB
ANION GAP SERPL CALCULATED.3IONS-SCNC: 8 MMOL/L (ref 7–15)
BUN SERPL-MCNC: 17.1 MG/DL (ref 8–23)
CALCIUM SERPL-MCNC: 9.2 MG/DL (ref 8.8–10.4)
CHLORIDE SERPL-SCNC: 103 MMOL/L (ref 98–107)
CREAT SERPL-MCNC: 0.76 MG/DL (ref 0.51–0.95)
EGFRCR SERPLBLD CKD-EPI 2021: 79 ML/MIN/1.73M2
EST. AVERAGE GLUCOSE BLD GHB EST-MCNC: 160 MG/DL
GLUCOSE SERPL-MCNC: 101 MG/DL (ref 70–99)
HBA1C MFR BLD: 7.2 % (ref 0–5.6)
HCO3 SERPL-SCNC: 26 MMOL/L (ref 22–29)
POTASSIUM SERPL-SCNC: 5 MMOL/L (ref 3.4–5.3)
SODIUM SERPL-SCNC: 137 MMOL/L (ref 135–145)

## 2025-08-06 PROCEDURE — 80048 BASIC METABOLIC PNL TOTAL CA: CPT

## 2025-08-06 PROCEDURE — 83036 HEMOGLOBIN GLYCOSYLATED A1C: CPT

## 2025-08-06 PROCEDURE — 36415 COLL VENOUS BLD VENIPUNCTURE: CPT

## 2025-08-07 ENCOUNTER — TELEPHONE (OUTPATIENT)
Dept: INTERNAL MEDICINE | Facility: CLINIC | Age: 80
End: 2025-08-07
Payer: COMMERCIAL

## 2025-08-13 ENCOUNTER — OFFICE VISIT (OUTPATIENT)
Dept: INTERNAL MEDICINE | Facility: CLINIC | Age: 80
End: 2025-08-13
Payer: COMMERCIAL

## 2025-08-13 ASSESSMENT — ANXIETY QUESTIONNAIRES
1. FEELING NERVOUS, ANXIOUS, OR ON EDGE: NOT AT ALL
6. BECOMING EASILY ANNOYED OR IRRITABLE: NOT AT ALL
7. FEELING AFRAID AS IF SOMETHING AWFUL MIGHT HAPPEN: NOT AT ALL
IF YOU CHECKED OFF ANY PROBLEMS ON THIS QUESTIONNAIRE, HOW DIFFICULT HAVE THESE PROBLEMS MADE IT FOR YOU TO DO YOUR WORK, TAKE CARE OF THINGS AT HOME, OR GET ALONG WITH OTHER PEOPLE: NOT DIFFICULT AT ALL
7. FEELING AFRAID AS IF SOMETHING AWFUL MIGHT HAPPEN: NOT AT ALL
GAD7 TOTAL SCORE: 1
5. BEING SO RESTLESS THAT IT IS HARD TO SIT STILL: NOT AT ALL
GAD7 TOTAL SCORE: 1
8. IF YOU CHECKED OFF ANY PROBLEMS, HOW DIFFICULT HAVE THESE MADE IT FOR YOU TO DO YOUR WORK, TAKE CARE OF THINGS AT HOME, OR GET ALONG WITH OTHER PEOPLE?: NOT DIFFICULT AT ALL
4. TROUBLE RELAXING: SEVERAL DAYS
GAD7 TOTAL SCORE: 1
2. NOT BEING ABLE TO STOP OR CONTROL WORRYING: NOT AT ALL
3. WORRYING TOO MUCH ABOUT DIFFERENT THINGS: NOT AT ALL